# Patient Record
Sex: MALE | Race: WHITE | NOT HISPANIC OR LATINO | Employment: OTHER | URBAN - METROPOLITAN AREA
[De-identification: names, ages, dates, MRNs, and addresses within clinical notes are randomized per-mention and may not be internally consistent; named-entity substitution may affect disease eponyms.]

---

## 2019-02-28 ENCOUNTER — HOSPITAL ENCOUNTER (EMERGENCY)
Facility: HOSPITAL | Age: 59
Discharge: HOME | End: 2019-02-28
Attending: EMERGENCY MEDICINE | Admitting: EMERGENCY MEDICINE
Payer: COMMERCIAL

## 2019-02-28 VITALS
HEIGHT: 71 IN | OXYGEN SATURATION: 95 % | SYSTOLIC BLOOD PRESSURE: 122 MMHG | RESPIRATION RATE: 18 BRPM | HEART RATE: 79 BPM | DIASTOLIC BLOOD PRESSURE: 64 MMHG | TEMPERATURE: 98.4 F | BODY MASS INDEX: 21.7 KG/M2 | WEIGHT: 155 LBS

## 2019-02-28 DIAGNOSIS — R07.9 CHEST PAIN: Primary | ICD-10-CM

## 2019-02-28 LAB
ATRIAL RATE: 69 BPM
P AXIS: 79 DEGREES
PR INTERVAL: 174 MS
QRS AXIS: 88 DEGREES
QRSD INTERVAL: 90 MS
QT INTERVAL: 382 MS
QTC INTERVAL: 409 MS
T WAVE AXIS: 71 DEGREES
TROPONIN I SERPL-MCNC: <0.02 NG/ML
VENTRICULAR RATE: 69 BPM

## 2019-02-28 PROCEDURE — 93005 ELECTROCARDIOGRAM TRACING: CPT

## 2019-02-28 PROCEDURE — 99283 EMERGENCY DEPT VISIT LOW MDM: CPT

## 2019-02-28 PROCEDURE — 93010 ELECTROCARDIOGRAM REPORT: CPT | Performed by: INTERNAL MEDICINE

## 2019-02-28 PROCEDURE — 36415 COLL VENOUS BLD VENIPUNCTURE: CPT | Performed by: EMERGENCY MEDICINE

## 2019-02-28 PROCEDURE — 84484 ASSAY OF TROPONIN QUANT: CPT | Performed by: EMERGENCY MEDICINE

## 2019-02-28 NOTE — ED PROVIDER NOTES
History  Chief Complaint   Patient presents with    Psychiatric Evaluation     Pt arrives for psych eval   Denies si/hi  Pt was in [address] 12/31/18, states hes still in shock  HPI     Patient is a pleasant 62year old male, chronic psych issues who presents with chest pain for many months  In no way worse today  + vague diluusions that seem to be long standing  No SI or HI  No radiation to the back  No tearing pain going to the back  Normal bilat UE pulses  No pleuritic pain  No history of PE  No new unilateral leg swelling  Non exertional  No sweats  No right sided pain  No vomiting  No fever or cough to suggest pneumonia  No  vomiting or subcue crepitus  Equal breath sounds  No skin rash  No f/c/s/n/v/d  No or sob  No abdominal pain  No dysuria, hematuria  MDM well appearing 62year old male, chronic chest pain and psych issues, clear from a psychiatric standpoint  Will rule out cardiac cause of chest pain and dc  The patient (and any family present) verbalized understanding of the discharge instructions and warnings that would necessitate return to the Emergency Department  Gave verbal in addition to written discharge instructions  Specifically highlighted areas of special concern regarding the written and verbal discharge instructions and return precautions  All questions were answered prior to discharge  None       History reviewed  No pertinent past medical history  History reviewed  No pertinent surgical history  History reviewed  No pertinent family history  I have reviewed and agree with the history as documented  Social History     Tobacco Use    Smoking status: Not on file   Substance Use Topics    Alcohol use: Not on file    Drug use: Not on file        Review of Systems   Cardiovascular: Positive for chest pain  All other systems reviewed and are negative        Physical Exam  Physical Exam   Constitutional: He is oriented to person, place, and time  He appears well-developed and well-nourished  HENT:   Head: Normocephalic and atraumatic  Eyes: Pupils are equal, round, and reactive to light  EOM are normal    Neck: Normal range of motion  Neck supple  Cardiovascular: Normal rate, regular rhythm and normal heart sounds  No murmur heard  Pulmonary/Chest: Effort normal and breath sounds normal  No respiratory distress  He has no wheezes  Abdominal: Soft  Bowel sounds are normal  He exhibits no distension  There is no tenderness  Musculoskeletal: Normal range of motion  He exhibits no edema or tenderness  Neurological: He is alert and oriented to person, place, and time  No cranial nerve deficit  Coordination normal    Skin: Skin is warm and dry  He is not diaphoretic  No erythema  Psychiatric: He has a normal mood and affect  His behavior is normal    Nursing note and vitals reviewed        Vital Signs  ED Triage Vitals   Temperature Pulse Respirations Blood Pressure SpO2   02/28/19 1145 02/28/19 1145 02/28/19 1145 02/28/19 1145 02/28/19 1145   98 4 °F (36 9 °C) 79 18 122/64 95 %      Temp Source Heart Rate Source Patient Position - Orthostatic VS BP Location FiO2 (%)   02/28/19 1145 02/28/19 1145 02/28/19 1145 02/28/19 1145 --   Tympanic Monitor Lying Right arm       Pain Score       02/28/19 1147       8           Vitals:    02/28/19 1145   BP: 122/64   Pulse: 79   Patient Position - Orthostatic VS: Lying       Visual Acuity      ED Medications  Medications - No data to display    Diagnostic Studies  Results Reviewed     Procedure Component Value Units Date/Time    Troponin I [[id number]]  (Normal) Collected:  02/28/19 1319    Lab Status:  Final result Specimen:  Blood from Arm, Left Updated:  02/28/19 1351     Troponin I <0 02 ng/mL                  No orders to display              Procedures  Procedures       Phone Contacts  ED Phone Contact    ED Course                               MDM    Disposition  Final diagnoses: Chest pain     Time reflects when diagnosis was documented in both MDM as applicable and the Disposition within this note     Time User Action Codes Description Comment    2/28/2019  2:33 PM Beatrice Ernandez, [address] [R07 9] Chest pain       ED Disposition     ED Disposition Condition Date/Time Comment    Discharge Stable Thu Feb 28, 2019  2:33 PM Georgiamerlin Gray discharge to home/self care  Follow-up Information     Follow up With Specialties Details Why Leanna Young MD Internal Medicine In 2 days  [address]  [facility]  49  [id number] [phone]            Patient's Medications    No medications on file     No discharge procedures on file      ED Provider  Electronically Signed by           Eliu Cleaning MD  02/28/19 7548

## 2019-05-20 ENCOUNTER — HOSPITAL ENCOUNTER (EMERGENCY)
Facility: HOSPITAL | Age: 59
Discharge: HOME | End: 2019-05-20
Attending: EMERGENCY MEDICINE | Admitting: EMERGENCY MEDICINE
Payer: COMMERCIAL

## 2019-05-20 VITALS
DIASTOLIC BLOOD PRESSURE: 80 MMHG | BODY MASS INDEX: 21.62 KG/M2 | SYSTOLIC BLOOD PRESSURE: 118 MMHG | HEART RATE: 82 BPM | RESPIRATION RATE: 20 BRPM | TEMPERATURE: 98.3 F | WEIGHT: 155 LBS | OXYGEN SATURATION: 97 %

## 2019-05-20 DIAGNOSIS — F22 DELUSION (HCC): Primary | ICD-10-CM

## 2019-05-20 LAB
BILIRUB UR QL STRIP: NEGATIVE
CLARITY UR: CLEAR
COLOR UR: YELLOW
GLUCOSE UR STRIP-MCNC: ABNORMAL MG/DL
HGB UR QL STRIP.AUTO: NEGATIVE
KETONES UR STRIP-MCNC: NEGATIVE MG/DL
LEUKOCYTE ESTERASE UR QL STRIP: NEGATIVE
NITRITE UR QL STRIP: NEGATIVE
PH UR STRIP.AUTO: 6 [PH] (ref 4.5–8)
PROT UR STRIP-MCNC: NEGATIVE MG/DL
SP GR UR STRIP.AUTO: 1.01 (ref 1–1.03)
UROBILINOGEN UR QL STRIP.AUTO: 0.2 E.U./DL

## 2019-05-20 PROCEDURE — 81003 URINALYSIS AUTO W/O SCOPE: CPT

## 2019-05-20 PROCEDURE — 99283 EMERGENCY DEPT VISIT LOW MDM: CPT | Performed by: EMERGENCY MEDICINE

## 2019-05-20 PROCEDURE — 99283 EMERGENCY DEPT VISIT LOW MDM: CPT

## 2019-05-20 RX ORDER — DIVALPROEX SODIUM 500 MG/1
500 TABLET, DELAYED RELEASE ORAL 2 TIMES DAILY
COMMUNITY

## 2020-11-02 ENCOUNTER — OFFICE VISIT (OUTPATIENT)
Dept: GASTROENTEROLOGY | Facility: CLINIC | Age: 60
End: 2020-11-02
Payer: COMMERCIAL

## 2020-11-02 VITALS
TEMPERATURE: 96.5 F | BODY MASS INDEX: 20.86 KG/M2 | WEIGHT: 149 LBS | HEIGHT: 71 IN | DIASTOLIC BLOOD PRESSURE: 67 MMHG | SYSTOLIC BLOOD PRESSURE: 112 MMHG | HEART RATE: 71 BPM

## 2020-11-02 DIAGNOSIS — E08.9 DIABETES MELLITUS DUE TO UNDERLYING CONDITION WITHOUT COMPLICATION, WITHOUT LONG-TERM CURRENT USE OF INSULIN (HCC): Primary | ICD-10-CM

## 2020-11-02 PROCEDURE — 99204 OFFICE O/P NEW MOD 45 MIN: CPT | Performed by: INTERNAL MEDICINE

## 2020-11-04 ENCOUNTER — TRANSCRIBE ORDERS (OUTPATIENT)
Dept: RADIOLOGY | Facility: HOSPITAL | Age: 60
End: 2020-11-04

## 2020-11-04 ENCOUNTER — HOSPITAL ENCOUNTER (OUTPATIENT)
Dept: RADIOLOGY | Facility: HOSPITAL | Age: 60
Discharge: HOME | End: 2020-11-04
Attending: INTERNAL MEDICINE
Payer: COMMERCIAL

## 2020-11-04 DIAGNOSIS — E08.9 DIABETES MELLITUS DUE TO UNDERLYING CONDITION WITHOUT COMPLICATION, WITHOUT LONG-TERM CURRENT USE OF INSULIN (HCC): ICD-10-CM

## 2020-11-04 PROCEDURE — 76705 ECHO EXAM OF ABDOMEN: CPT

## 2020-11-06 ENCOUNTER — TELEPHONE (OUTPATIENT)
Dept: GASTROENTEROLOGY | Facility: AMBULARY SURGERY CENTER | Age: 60
End: 2020-11-06

## 2020-11-19 ENCOUNTER — TELEPHONE (OUTPATIENT)
Dept: GASTROENTEROLOGY | Facility: CLINIC | Age: 60
End: 2020-11-19

## 2020-12-07 ENCOUNTER — LAB (OUTPATIENT)
Dept: LAB | Facility: HOSPITAL | Age: 60
End: 2020-12-07
Attending: INTERNAL MEDICINE
Payer: COMMERCIAL

## 2020-12-07 DIAGNOSIS — N40.3 NODULAR PROSTATE WITH URINARY OBSTRUCTION: ICD-10-CM

## 2020-12-07 DIAGNOSIS — N13.8 ENLARGED PROSTATE WITH URINARY OBSTRUCTION: Primary | ICD-10-CM

## 2020-12-07 DIAGNOSIS — N13.8 NODULAR PROSTATE WITH URINARY OBSTRUCTION: ICD-10-CM

## 2020-12-07 DIAGNOSIS — E08.9 DIABETES MELLITUS DUE TO UNDERLYING CONDITION WITHOUT COMPLICATION, WITHOUT LONG-TERM CURRENT USE OF INSULIN (HCC): ICD-10-CM

## 2020-12-07 DIAGNOSIS — N40.1 ENLARGED PROSTATE WITH URINARY OBSTRUCTION: Primary | ICD-10-CM

## 2020-12-07 LAB
ALBUMIN SERPL BCP-MCNC: 3.4 G/DL (ref 3.5–5)
ALP SERPL-CCNC: 85 U/L (ref 46–116)
ALT SERPL W P-5'-P-CCNC: 20 U/L (ref 12–78)
ANION GAP SERPL CALCULATED.3IONS-SCNC: 3 MMOL/L (ref 4–13)
AST SERPL W P-5'-P-CCNC: 10 U/L (ref 5–45)
BASOPHILS # BLD AUTO: 0.02 THOUSANDS/ΜL (ref 0–0.1)
BASOPHILS NFR BLD AUTO: 0 % (ref 0–1)
BILIRUB SERPL-MCNC: 0.36 MG/DL (ref 0.2–1)
BUN SERPL-MCNC: 12 MG/DL (ref 5–25)
CALCIUM ALBUM COR SERPL-MCNC: 9.2 MG/DL (ref 8.3–10.1)
CALCIUM SERPL-MCNC: 8.7 MG/DL (ref 8.3–10.1)
CHLORIDE SERPL-SCNC: 111 MMOL/L (ref 100–108)
CO2 SERPL-SCNC: 30 MMOL/L (ref 21–32)
CREAT SERPL-MCNC: 0.87 MG/DL (ref 0.6–1.3)
EOSINOPHIL # BLD AUTO: 0.21 THOUSAND/ΜL (ref 0–0.61)
EOSINOPHIL NFR BLD AUTO: 4 % (ref 0–6)
ERYTHROCYTE [DISTWIDTH] IN BLOOD BY AUTOMATED COUNT: 13.7 % (ref 11.6–15.1)
GFR SERPL CREATININE-BSD FRML MDRD: 94 ML/MIN/1.73SQ M
GLUCOSE P FAST SERPL-MCNC: 89 MG/DL (ref 65–99)
HCT VFR BLD AUTO: 47.8 % (ref 36.5–49.3)
HGB BLD-MCNC: 15.9 G/DL (ref 12–17)
IMM GRANULOCYTES # BLD AUTO: 0.03 THOUSAND/UL (ref 0–0.2)
IMM GRANULOCYTES NFR BLD AUTO: 1 % (ref 0–2)
LYMPHOCYTES # BLD AUTO: 1.43 THOUSANDS/ΜL (ref 0.6–4.47)
LYMPHOCYTES NFR BLD AUTO: 25 % (ref 14–44)
MCH RBC QN AUTO: 35.3 PG (ref 26.8–34.3)
MCHC RBC AUTO-ENTMCNC: 33.3 G/DL (ref 31.4–37.4)
MCV RBC AUTO: 106 FL (ref 82–98)
MONOCYTES # BLD AUTO: 0.58 THOUSAND/ΜL (ref 0.17–1.22)
MONOCYTES NFR BLD AUTO: 10 % (ref 4–12)
NEUTROPHILS # BLD AUTO: 3.46 THOUSANDS/ΜL (ref 1.85–7.62)
NEUTS SEG NFR BLD AUTO: 60 % (ref 43–75)
NRBC BLD AUTO-RTO: 0 /100 WBCS
PLATELET # BLD AUTO: 213 THOUSANDS/UL (ref 149–390)
PMV BLD AUTO: 9.4 FL (ref 8.9–12.7)
POTASSIUM SERPL-SCNC: 4.2 MMOL/L (ref 3.5–5.3)
PROT SERPL-MCNC: 6.4 G/DL (ref 6.4–8.2)
PSA SERPL-MCNC: 1.4 NG/ML (ref 0–4)
RBC # BLD AUTO: 4.5 MILLION/UL (ref 3.88–5.62)
SODIUM SERPL-SCNC: 144 MMOL/L (ref 136–145)
WBC # BLD AUTO: 5.73 THOUSAND/UL (ref 4.31–10.16)

## 2020-12-07 PROCEDURE — 36415 COLL VENOUS BLD VENIPUNCTURE: CPT

## 2020-12-07 PROCEDURE — 85025 COMPLETE CBC W/AUTO DIFF WBC: CPT

## 2020-12-07 PROCEDURE — G0103 PSA SCREENING: HCPCS

## 2020-12-07 PROCEDURE — 80053 COMPREHEN METABOLIC PANEL: CPT

## 2021-01-12 ENCOUNTER — TELEPHONE (OUTPATIENT)
Dept: GASTROENTEROLOGY | Facility: CLINIC | Age: 61
End: 2021-01-12

## 2021-01-12 NOTE — TELEPHONE ENCOUNTER
Pt walked in office requesting result for US done in 11/2020  Made aware results were reviewed by MA & RN in 11/2020 pt stated he would like a second opinion because he saw "spots" on his ultrasound  Would also like copy of results sent to home address listed on file  Please advise, pt's ph# [phone]

## 2021-01-18 NOTE — TELEPHONE ENCOUNTER
Relayed message from nurse blane(see 11/2/20 note) pt calling stating he still hasnt received results( went over w/ pt us results pt seems confused)

## 2021-01-25 NOTE — TELEPHONE ENCOUNTER
Patient called regarding US results, I advised patient that US was normal, per the previous notes from PA, Nurse and MA  Patient states he wants a second opinion, (looks like imaging was already faxed to [facility] urologist at pt's request)  He wants to know why he is still experiencing "gallbladder pain"  Please advise, I'm not sure how to proceed with this        Thank you

## 2021-01-26 NOTE — TELEPHONE ENCOUNTER
Please call patient and offer follow up appointment if he has further questions/concerns  Thank you

## 2021-02-23 ENCOUNTER — TELEPHONE (OUTPATIENT)
Dept: GASTROENTEROLOGY | Facility: CLINIC | Age: 61
End: 2021-02-23

## 2021-02-23 NOTE — TELEPHONE ENCOUNTER
Called and left message for pt to call office to reschedule appt with dr Chung Carbon 2/24   He is a patient of dr Rosaura Carnes, and will need to be put on his schedule

## 2021-03-02 ENCOUNTER — LAB (OUTPATIENT)
Dept: LAB | Facility: HOSPITAL | Age: 61
End: 2021-03-02
Payer: COMMERCIAL

## 2021-03-02 ENCOUNTER — TELEPHONE (OUTPATIENT)
Dept: GASTROENTEROLOGY | Facility: AMBULARY SURGERY CENTER | Age: 61
End: 2021-03-02

## 2021-03-02 DIAGNOSIS — E08.9 DIABETES MELLITUS DUE TO UNDERLYING CONDITION WITHOUT COMPLICATION, WITHOUT LONG-TERM CURRENT USE OF INSULIN (HCC): Primary | ICD-10-CM

## 2021-03-02 DIAGNOSIS — E08.9 DIABETES MELLITUS DUE TO UNDERLYING CONDITION WITHOUT COMPLICATION, WITHOUT LONG-TERM CURRENT USE OF INSULIN (HCC): ICD-10-CM

## 2021-03-02 LAB
ALBUMIN SERPL BCP-MCNC: 3.8 G/DL (ref 3.5–5)
ALP SERPL-CCNC: 90 U/L (ref 46–116)
ALT SERPL W P-5'-P-CCNC: 24 U/L (ref 12–78)
ANION GAP SERPL CALCULATED.3IONS-SCNC: 4 MMOL/L (ref 4–13)
AST SERPL W P-5'-P-CCNC: 17 U/L (ref 5–45)
BASOPHILS # BLD AUTO: 0.02 THOUSANDS/ΜL (ref 0–0.1)
BASOPHILS NFR BLD AUTO: 0 % (ref 0–1)
BILIRUB SERPL-MCNC: 0.38 MG/DL (ref 0.2–1)
BUN SERPL-MCNC: 10 MG/DL (ref 5–25)
CALCIUM SERPL-MCNC: 8.7 MG/DL (ref 8.3–10.1)
CHLORIDE SERPL-SCNC: 103 MMOL/L (ref 100–108)
CO2 SERPL-SCNC: 34 MMOL/L (ref 21–32)
CREAT SERPL-MCNC: 0.96 MG/DL (ref 0.6–1.3)
EOSINOPHIL # BLD AUTO: 0.08 THOUSAND/ΜL (ref 0–0.61)
EOSINOPHIL NFR BLD AUTO: 1 % (ref 0–6)
ERYTHROCYTE [DISTWIDTH] IN BLOOD BY AUTOMATED COUNT: 13.3 % (ref 11.6–15.1)
GFR SERPL CREATININE-BSD FRML MDRD: 86 ML/MIN/1.73SQ M
GLUCOSE SERPL-MCNC: 199 MG/DL (ref 65–140)
HCT VFR BLD AUTO: 43.9 % (ref 36.5–49.3)
HGB BLD-MCNC: 14.9 G/DL (ref 12–17)
IMM GRANULOCYTES # BLD AUTO: 0.02 THOUSAND/UL (ref 0–0.2)
IMM GRANULOCYTES NFR BLD AUTO: 0 % (ref 0–2)
LYMPHOCYTES # BLD AUTO: 1.38 THOUSANDS/ΜL (ref 0.6–4.47)
LYMPHOCYTES NFR BLD AUTO: 25 % (ref 14–44)
MCH RBC QN AUTO: 34.6 PG (ref 26.8–34.3)
MCHC RBC AUTO-ENTMCNC: 33.9 G/DL (ref 31.4–37.4)
MCV RBC AUTO: 102 FL (ref 82–98)
MONOCYTES # BLD AUTO: 0.49 THOUSAND/ΜL (ref 0.17–1.22)
MONOCYTES NFR BLD AUTO: 9 % (ref 4–12)
NEUTROPHILS # BLD AUTO: 3.6 THOUSANDS/ΜL (ref 1.85–7.62)
NEUTS SEG NFR BLD AUTO: 65 % (ref 43–75)
NRBC BLD AUTO-RTO: 0 /100 WBCS
PLATELET # BLD AUTO: 251 THOUSANDS/UL (ref 149–390)
PMV BLD AUTO: 9.3 FL (ref 8.9–12.7)
POTASSIUM SERPL-SCNC: 3.9 MMOL/L (ref 3.5–5.3)
PROT SERPL-MCNC: 7 G/DL (ref 6.4–8.2)
RBC # BLD AUTO: 4.31 MILLION/UL (ref 3.88–5.62)
SODIUM SERPL-SCNC: 141 MMOL/L (ref 136–145)
WBC # BLD AUTO: 5.59 THOUSAND/UL (ref 4.31–10.16)

## 2021-03-02 PROCEDURE — 36415 COLL VENOUS BLD VENIPUNCTURE: CPT

## 2021-03-02 PROCEDURE — 80053 COMPREHEN METABOLIC PANEL: CPT

## 2021-03-02 PROCEDURE — 85025 COMPLETE CBC W/AUTO DIFF WBC: CPT

## 2021-03-02 NOTE — TELEPHONE ENCOUNTER
Patients GI provider:  Dr Lary Dumas     Number to return call: [phone]    Reason for call: Pt calling stated he is in pain would like to speak with someone to discuss    Scheduled procedure/appointment date if applicable: Apt/procedure 3/10/21

## 2021-03-02 NOTE — TELEPHONE ENCOUNTER
Triage Telephone Intake  Dr Lara Wu   Chief complaint: 8/10 constant pain in RUQ of ABD x 1 year     Diarrhea:denies  Constipation: occasional  LBM: today  Nausea:denies   Vomiting:occasional   Fever:denies  Chills:denies  Patient stated he sees psychiatry every 28 days & has a   Patient states he has not had alcohol to drink in 8 months   Denies taking Acetaminophen or Ibuprofen         Last office visit:20  Patient has follow up appointment scheduled:3/10/21  Labs/imagin20 U/S RUQ ABD     Message forwarded to provider re: recommendations:  Do you want to order U/S RUQ? Labs to check liver enzymes, CMP, CBC?

## 2021-03-02 NOTE — TELEPHONE ENCOUNTER
Patient made aware labs ordered to be obtained today or tomorrow prior to 3/10/21 office visit  Patient made aware if pain persists or worsens he should be evaluated in ED

## 2021-03-10 ENCOUNTER — OFFICE VISIT (OUTPATIENT)
Dept: GASTROENTEROLOGY | Facility: CLINIC | Age: 61
End: 2021-03-10
Payer: COMMERCIAL

## 2021-03-10 VITALS
SYSTOLIC BLOOD PRESSURE: 110 MMHG | TEMPERATURE: 95 F | HEART RATE: 73 BPM | DIASTOLIC BLOOD PRESSURE: 71 MMHG | WEIGHT: 165.2 LBS | BODY MASS INDEX: 23.13 KG/M2 | HEIGHT: 71 IN

## 2021-03-10 DIAGNOSIS — Z12.11 SCREENING FOR COLON CANCER: ICD-10-CM

## 2021-03-10 DIAGNOSIS — K59.09 OTHER CONSTIPATION: Primary | ICD-10-CM

## 2021-03-10 DIAGNOSIS — F22 DELUSIONAL DISORDER (HCC): ICD-10-CM

## 2021-03-10 PROCEDURE — 99214 OFFICE O/P EST MOD 30 MIN: CPT | Performed by: INTERNAL MEDICINE

## 2021-03-10 RX ORDER — BLOOD SUGAR DIAGNOSTIC
STRIP MISCELLANEOUS
COMMUNITY
Start: 2021-02-04

## 2021-03-10 RX ORDER — PALIPERIDONE PALMITATE 234 MG/1.5ML
INJECTION INTRAMUSCULAR
COMMUNITY
Start: 2021-03-05

## 2021-03-10 RX ORDER — LINAGLIPTIN 5 MG/1
TABLET, FILM COATED ORAL
COMMUNITY
Start: 2021-03-04

## 2021-03-10 RX ORDER — RISPERIDONE 1 MG/1
4 TABLET, FILM COATED ORAL DAILY
COMMUNITY

## 2021-03-10 RX ORDER — ATORVASTATIN CALCIUM 20 MG/1
TABLET, FILM COATED ORAL
COMMUNITY
Start: 2021-02-13

## 2021-03-10 RX ORDER — MELOXICAM 15 MG/1
TABLET ORAL
COMMUNITY
Start: 2020-12-03

## 2021-03-10 RX ORDER — IPRATROPIUM/ALBUTEROL SULFATE 20-100 MCG
1 MIST INHALER (GRAM) INHALATION 4 TIMES DAILY PRN
COMMUNITY
Start: 2020-09-15

## 2021-03-10 NOTE — ASSESSMENT & PLAN NOTE
Due for colorectal cancer screening  Reports having had colonoscopy several years ago  Report and results are unavailable  No alarm symptoms but limited information as patient is poor historian    · Follow-up in 6 months to discuss colon cancer screening  · Needs psychiatric issues to be addressed and optimized  · Advised importance of close follow-up with family doctor and potentially psychiatry as well  · If unable to adequately perform prep for colonoscopy, can consider other modalities as well such as Cologuard if patient is able and willing once his psychiatric issues have been addressed

## 2021-03-10 NOTE — PROGRESS NOTES
[facility] - Outpatient Follow-up  Migel Fonseca 61 y o  male MRN: [id number]  Encounter: [id number]      ASSESSMENT AND PLAN:      Problem List Items Addressed This Visit        Other    Screening for colon cancer     Due for colorectal cancer screening  Reports having had colonoscopy several years ago  Report and results are unavailable  No alarm symptoms but limited information as patient is poor historian  · Follow-up in 6 months to discuss colon cancer screening  · Needs psychiatric issues to be addressed and optimized  · Advised importance of close follow-up with family doctor and potentially psychiatry as well  · If unable to adequately perform prep for colonoscopy, can consider other modalities as well such as Cologuard if patient is able and willing once his psychiatric issues have been addressed         Other constipation - Primary     Not causing any significant distress at this time but limited history as patient is poor historian  Reports having bowel movement every other day  Does endorse some delusions regarding his bowel movements as well  · Encouraged adequate hydration  · Continue symptomatic management with OTC medications for now         Delusional disorder ([address] )     Does not appear to be adequately treated at this time as patient continues to express delusions and has limited insight as to his medical conditions  Expresses multiple delusional beliefs including thinking he has a computer in his rectum    · Requires close follow-up with family doctor and psychiatry for better control of his psychiatric issues prior to any other medical intervention or measure  · Advised importance of close follow-up with family doctor to patient; states he does have an appointment scheduled  · Continue psychiatric medications per PCP         Relevant Medications    Invega Sustenna 234 MG/1 5ML IM injection    risperiDONE (RisperDAL) 1 mg tablet ______________________________________________________________________    HPI:  72-year-old male presents to GI office for follow-up  Patient is a poor historian with limited insight into his medical issues due to ongoing active delusions  He denies any acute GI complaints at this time but does report constipation from time to time  Reports having bowel movement every other day but states he "has a computer in his rectum" and he sees "sperm cells in his stool"  I reviewed imaging and lab results with him  His main complaint is back pain which he believes is due to a herniated disc which he attributes to lithium that he was taking during a previous inpatient psychiatric stay  I asked him about previous colonoscopy and he states he had one several years ago by Dr Helen Sun  Report and results are not available for review and patient is unable to recall specific findings  Denies any other alarm symptoms at this time but is limited due to his psychiatric issues  REVIEW OF SYSTEMS:    CONSTITUTIONAL: Denies any fever, chills, rigors, and weight loss  HEENT: No earache or tinnitus, denies hearing loss or visual disturbances  CARDIOVASCULAR: No chest pain or palpitations   RESPIRATORY: Denies any cough, hemoptysis, shortness of breath or dyspnea on exertion  GASTROINTESTINAL: As noted in the History of Present Illness   GENITOURINARY: No problems with urination, denies any hematuria or dysuria  NEUROLOGIC: No dizziness or vertigo, denies headaches   MUSCULOSKELETAL: Denies any muscle or joint pain   SKIN: Denies skin rashes or itching   ENDOCRINE: Denies excessive thirst, denies intolerance to heat or cold  PSYCHOSOCIAL: Denies depression or anxiety, denies any recent memory loss     Historical Information   History reviewed  No pertinent past medical history  History reviewed  No pertinent surgical history    Social History   Social History     Substance and Sexual Activity   Alcohol Use Yes    Comment: rare     Social History     Substance and Sexual Activity   Drug Use Not Currently    Comment: "i used to"     Social History     Tobacco Use   Smoking Status Current Every Day Smoker    Types: Cigars   Smokeless Tobacco Never Used     History reviewed  No pertinent family history  Meds/Allergies   (Not in a hospital admission)    No current facility-administered medications for this visit  Allergies   Allergen Reactions    Iodine Other (See Comments)     unknown         PHYSICAL EXAM:      Objective   Blood pressure 110/71, pulse 73, temperature (!) 95 °F (35 °C), temperature source Tympanic, height 5' 11" (1 803 m), weight 74 9 kg (165 lb 3 2 oz)  Body mass index is 23 04 kg/m²  General Appearance:   Alert, cooperative, no distress, expresses delusions and poor insight into medical issues   HEENT:   Normocephalic, atraumatic, anicteric, strabismus     Neck:   Supple, symmetrical, trachea midline   Lungs:   Clear to auscultation bilaterally; no rales, rhonchi or wheezing; respirations unlabored    Heart:   Regular rate and rhythm; no murmur, rub, or gallop   Abdomen:   Soft, non-tender, non-distended; normal bowel sounds; no masses, no organomegaly    Genitalia:   Deferred    Rectal:   Deferred    Extremities:   No cyanosis, clubbing or edema    Pulses:   2+ and symmetric all extremities    Skin:   No jaundice, rashes, or lesions    Lymph Nodes:   No palpable cervical lymphadenopathy      LAB RESULTS:     No visits with results within 1 Day(s) from this visit     Latest known visit with results is:   Lab on 03/02/2021   Component Date Value    WBC 03/02/2021 5 59     RBC 03/02/2021 4 31     Hemoglobin 03/02/2021 14 9     Hematocrit 03/02/2021 43 9     MCV 03/02/2021 102*    MCH 03/02/2021 34 6*    MCHC 03/02/2021 33 9     RDW 03/02/2021 13 3     MPV 03/02/2021 9 3     Platelets 35/17/4710 251     nRBC 03/02/2021 0     Neutrophils Relative 03/02/2021 65     Immat GRANS % 03/02/2021 0     Lymphocytes Relative 03/02/2021 25     Monocytes Relative 03/02/2021 9     Eosinophils Relative 03/02/2021 1     Basophils Relative 03/02/2021 0     Neutrophils Absolute 03/02/2021 3 60     Immature Grans Absolute 03/02/2021 0 02     Lymphocytes Absolute 03/02/2021 1 38     Monocytes Absolute 03/02/2021 0 49     Eosinophils Absolute 03/02/2021 0 08     Basophils Absolute 03/02/2021 0 02     Sodium 03/02/2021 141     Potassium 03/02/2021 3 9     Chloride 03/02/2021 103     CO2 03/02/2021 34*    ANION GAP 03/02/2021 4     BUN 03/02/2021 10     Creatinine 03/02/2021 0 96     Glucose 03/02/2021 199*    Calcium 03/02/2021 8 7     AST 03/02/2021 17     ALT 03/02/2021 24     Alkaline Phosphatase 03/02/2021 90     Total Protein 03/02/2021 7 0     Albumin 03/02/2021 3 8     Total Bilirubin 03/02/2021 0 38     eGFR 03/02/2021 86        RADIOLOGY RESULTS: I have personally reviewed pertinent imaging studies  LATANYA Salgado  Gastroenterology Fellow  [facility]  Available on [email or web address]

## 2021-03-10 NOTE — PATIENT INSTRUCTIONS
1  Follow-up with your family doctor for your back pain  2  You are due for your colonoscopy  We have recommended this for you but you have elected to defer for now  I would follow-up with your family doctor and discuss need for colon cancer screening  We can readdress this in 6 months at your follow-up

## 2021-03-10 NOTE — ASSESSMENT & PLAN NOTE
Not causing any significant distress at this time but limited history as patient is poor historian  Reports having bowel movement every other day  Does endorse some delusions regarding his bowel movements as well    · Encouraged adequate hydration  · Continue symptomatic management with OTC medications for now

## 2021-03-10 NOTE — ASSESSMENT & PLAN NOTE
Does not appear to be adequately treated at this time as patient continues to express delusions and has limited insight as to his medical conditions  Expresses multiple delusional beliefs including thinking he has a computer in his rectum    · Requires close follow-up with family doctor and psychiatry for better control of his psychiatric issues prior to any other medical intervention or measure  · Advised importance of close follow-up with family doctor to patient; states he does have an appointment scheduled  · Continue psychiatric medications per PCP

## 2021-04-05 ENCOUNTER — TELEPHONE (OUTPATIENT)
Dept: GASTROENTEROLOGY | Facility: CLINIC | Age: 61
End: 2021-04-05

## 2021-04-05 NOTE — TELEPHONE ENCOUNTER
Patient of Dr Leonel Schlatter, last seen 3/10/21    History of constipation, delusional disorder    Called and spoke with patient  He called to discuss his 11/2000 u/s results  He has called several times in the past to discuss the same u/s results  He asked if the results show a kidney stone  I let him know there was no kidney stones noted on his ultrasound  He told me he was experiencing incontinence and there was no sperm in his penis  I let him know that we are a gastroenterologist so he should contact his PCP or a urologist with those concerns  He then asked me if his liver had cirrhosis  I explained it did not on the last ultrasound  He then mentioned having a cyst in his rectum  He had previously told Dr Leonel Schlatter in 3/10 appointment he believed he had a computer chip in his rectum  He states that because of the cyst he does not want to come see us anymore  I advised if he has any further health concerns we can help with he should call us   No further questions

## 2021-04-05 NOTE — TELEPHONE ENCOUNTER
Pt requested a call back from a nurse regarding some questions on ultra sound done on 11/4/20  Please follow up at [phone]

## 2022-10-12 PROBLEM — Z12.11 SCREENING FOR COLON CANCER: Status: RESOLVED | Noted: 2021-03-10 | Resolved: 2022-10-12

## 2022-11-07 ENCOUNTER — HOSPITAL ENCOUNTER (EMERGENCY)
Facility: HOSPITAL | Age: 62
Discharge: HOME | End: 2022-11-07
Attending: EMERGENCY MEDICINE

## 2022-11-07 VITALS
OXYGEN SATURATION: 99 % | RESPIRATION RATE: 18 BRPM | HEIGHT: 71 IN | BODY MASS INDEX: 23.1 KG/M2 | WEIGHT: 165 LBS | HEART RATE: 79 BPM | TEMPERATURE: 98.3 F | DIASTOLIC BLOOD PRESSURE: 81 MMHG | SYSTOLIC BLOOD PRESSURE: 125 MMHG

## 2022-11-07 DIAGNOSIS — F20.0 PARANOID SCHIZOPHRENIA (HCC): ICD-10-CM

## 2022-11-07 DIAGNOSIS — K08.89 PAIN, DENTAL: Primary | ICD-10-CM

## 2022-11-07 LAB
ALBUMIN SERPL BCP-MCNC: 4.3 G/DL (ref 3.5–5)
ALP SERPL-CCNC: 77 U/L (ref 34–104)
ALT SERPL W P-5'-P-CCNC: 14 U/L (ref 7–52)
ANION GAP SERPL CALCULATED.3IONS-SCNC: 5 MMOL/L (ref 4–13)
AST SERPL W P-5'-P-CCNC: 17 U/L (ref 13–39)
BASOPHILS # BLD AUTO: 0.04 THOUSANDS/ÂΜL (ref 0–0.1)
BASOPHILS NFR BLD AUTO: 1 % (ref 0–1)
BILIRUB SERPL-MCNC: 0.6 MG/DL (ref 0.2–1)
BUN SERPL-MCNC: 6 MG/DL (ref 5–25)
CALCIUM SERPL-MCNC: 9.2 MG/DL (ref 8.4–10.2)
CHLORIDE SERPL-SCNC: 97 MMOL/L (ref 96–108)
CO2 SERPL-SCNC: 32 MMOL/L (ref 21–32)
CREAT SERPL-MCNC: 0.77 MG/DL (ref 0.6–1.3)
EOSINOPHIL # BLD AUTO: 0.16 THOUSAND/ÂΜL (ref 0–0.61)
EOSINOPHIL NFR BLD AUTO: 3 % (ref 0–6)
ERYTHROCYTE [DISTWIDTH] IN BLOOD BY AUTOMATED COUNT: 13.1 % (ref 11.6–15.1)
ETHANOL EXG-MCNC: 0 MG/DL
GFR SERPL CREATININE-BSD FRML MDRD: 97 ML/MIN/1.73SQ M
GLUCOSE SERPL-MCNC: 124 MG/DL (ref 65–140)
HCT VFR BLD AUTO: 45.1 % (ref 36.5–49.3)
HGB BLD-MCNC: 16.2 G/DL (ref 12–17)
IMM GRANULOCYTES # BLD AUTO: 0.02 THOUSAND/UL (ref 0–0.2)
IMM GRANULOCYTES NFR BLD AUTO: 0 % (ref 0–2)
LYMPHOCYTES # BLD AUTO: 2.05 THOUSANDS/ÂΜL (ref 0.6–4.47)
LYMPHOCYTES NFR BLD AUTO: 37 % (ref 14–44)
MCH RBC QN AUTO: 34.6 PG (ref 26.8–34.3)
MCHC RBC AUTO-ENTMCNC: 35.9 G/DL (ref 31.4–37.4)
MCV RBC AUTO: 96 FL (ref 82–98)
MONOCYTES # BLD AUTO: 0.67 THOUSAND/ÂΜL (ref 0.17–1.22)
MONOCYTES NFR BLD AUTO: 12 % (ref 4–12)
NEUTROPHILS # BLD AUTO: 2.57 THOUSANDS/ÂΜL (ref 1.85–7.62)
NEUTS SEG NFR BLD AUTO: 47 % (ref 43–75)
NRBC BLD AUTO-RTO: 0 /100 WBCS
PLATELET # BLD AUTO: 207 THOUSANDS/UL (ref 149–390)
PMV BLD AUTO: 9 FL (ref 8.9–12.7)
POTASSIUM SERPL-SCNC: 3.8 MMOL/L (ref 3.5–5.3)
PROT SERPL-MCNC: 7 G/DL (ref 6.4–8.4)
RBC # BLD AUTO: 4.68 MILLION/UL (ref 3.88–5.62)
SARS-COV-2 RNA RESP QL NAA+PROBE: NEGATIVE
SODIUM SERPL-SCNC: 134 MMOL/L (ref 135–147)
TSH SERPL DL<=0.05 MIU/L-ACNC: 2.16 UIU/ML (ref 0.45–4.5)
VALPROATE SERPL-MCNC: <10 UG/ML (ref 50–100)
WBC # BLD AUTO: 5.51 THOUSAND/UL (ref 4.31–10.16)

## 2022-11-07 RX ORDER — IBUPROFEN 600 MG/1
600 TABLET ORAL 3 TIMES DAILY
Qty: 30 TABLET | Refills: 0 | Status: SHIPPED | OUTPATIENT
Start: 2022-11-07 | End: 2022-11-17

## 2022-11-07 RX ORDER — IBUPROFEN 600 MG/1
600 TABLET ORAL ONCE
Status: COMPLETED | OUTPATIENT
Start: 2022-11-07 | End: 2022-11-07

## 2022-11-07 RX ORDER — PENICILLIN V POTASSIUM 500 MG/1
500 TABLET ORAL 4 TIMES DAILY
Qty: 28 TABLET | Refills: 0 | Status: SHIPPED | OUTPATIENT
Start: 2022-11-07 | End: 2022-11-14

## 2022-11-07 RX ORDER — PENICILLIN V POTASSIUM 250 MG/1
500 TABLET ORAL ONCE
Status: COMPLETED | OUTPATIENT
Start: 2022-11-07 | End: 2022-11-07

## 2022-11-07 RX ORDER — PENICILLIN V POTASSIUM 250 MG/1
500 TABLET ORAL EVERY 6 HOURS SCHEDULED
Status: DISCONTINUED | OUTPATIENT
Start: 2022-11-07 | End: 2022-11-07 | Stop reason: HOSPADM

## 2022-11-07 RX ADMIN — PENICILLIN V POTASSIUM 500 MG: 250 TABLET, FILM COATED ORAL at 01:56

## 2022-11-07 RX ADMIN — IBUPROFEN 600 MG: 600 TABLET, FILM COATED ORAL at 01:56

## 2022-11-07 NOTE — ED NOTES
This writer discussed the patients current presentation and recommended discharge plan with Dr Boykin   They agree with the patient being discharged at this time with referrals and/or information about ACT    The patient was Instructed to follow up with their act   In addition, the patient was instructed to call local [facility] crisis, other crisis services, [facility] or to go to the nearest ER immediately if their situation changes at any time  This writer discussed discharge plans with the patient and Act  who agrees with and understands the discharge plans           SAFETY PLAN  Warning Signs (thoughts, images, mood, behavior, situations) of a potential crisis: depression, suicidal thoughts      Coping Skills (what can I do to take my mind off the problem, or to keep myself safe): speak to brother or act       Outside Support (who can I reach out to for support and help): Return to ED        National Suicide Prevention Hotline:  [facility] [address] [place] [address] [phone] - [facility] [address]: [facility]: [address] [facility] [phone] - Crisis   [phone] - Peer Support Talk Line (1-9pm daily)  [phone] - Teen Support Talk Line (1-9pm daily)  [address] [place] ([place]) [phone] - [facility]

## 2022-11-07 NOTE — ED PROVIDER NOTES
History  Chief Complaint   Patient presents with   • Dental Pain     Patient arrives via EMS stating "I had a tooth pulled last week and I think I have gangrene or fentanyl  It hurts a lot " "I think they injected me with ink  [facility] "     Patient tells me that he had a tooth pulled from the dentist a week or 2  He tells me that he is not sure what the dentist injected in him but he thinks he may have an anchor LSD  Now has pain in his bottom molar  Pain is worse with chewing  It is throbbing without radiation  Onset was gradual a week ago  He wants a medication to help him with the pain  He denies any fevers or chills  He denies any difficulty swallowing  He denies any pain with opening his mouth  Patient tells me that he is constantly worried  He tells me that he has hallucinations  He does me is noncompliant with his psychiatric medicines  He denies being suicidal or homicidal   When asked if he wants to speak with crisis he told me “you tell me you are the doctor "            Prior to Admission Medications   Prescriptions Last Dose Informant Patient Reported? Taking?    Callie Ross Sustenna 234 MG/1 5ML IM injection Not Taking at Unknown time Self Yes No   Patient not taking: Reported on 11/7/2022   OneTouch Ultra test strip  Self Yes Yes   Tradjenta 5 MG TABS Not Taking at Unknown time Self Yes No   Patient not taking: Reported on 11/7/2022   atorvastatin (LIPITOR) 20 mg tablet Not Taking at Unknown time Self Yes No   Patient not taking: Reported on 11/7/2022   divalproex sodium (DEPAKOTE) 500 mg EC tablet Not Taking at Unknown time Self Yes No   Sig: Take 500 mg by mouth 2 (two) times a day   Patient not taking: Reported on 11/7/2022   ipratropium-albuterol (Combivent Respimat) inhaler  Self Yes Yes   Sig: Inhale 1 puff 4 (four) times a day as needed   meloxicam (MOBIC) 15 mg tablet Not Taking at Unknown time Self Yes No   Patient not taking: Reported on 11/7/2022   metFORMIN (GLUCOPHAGE) 500 mg tablet Not Taking at Unknown time Self Yes No   Patient not taking: Reported on 11/7/2022   risperiDONE (RisperDAL) 1 mg tablet Not Taking at Unknown time Self Yes No   Sig: Take 4 mg by mouth daily   Patient not taking: Reported on 11/7/2022      Facility-Administered Medications: None       History reviewed  No pertinent past medical history  History reviewed  No pertinent surgical history  History reviewed  No pertinent family history  I have reviewed and agree with the history as documented  E-Cigarette/Vaping   • E-Cigarette Use Current Every Day User      E-Cigarette/Vaping Substances   • Nicotine Yes    • THC No    • CBD No    • Flavoring No    • Other No    • Unknown No      Social History     Tobacco Use   • Smoking status: Current Every Day Smoker     Types: Cigars   • Smokeless tobacco: Never Used   Vaping Use   • Vaping Use: Every day   • Substances: Nicotine   Substance Use Topics   • Alcohol use: Not Currently     Comment: rare   • Drug use: Not Currently     Comment: "i used to"       Review of Systems   All other systems reviewed and are negative  Physical Exam  Physical Exam  Vitals and nursing note reviewed  Constitutional:       Appearance: He is well-developed  HENT:      Head: Normocephalic and atraumatic  Mouth/Throat:      Comments: Generalized poor dentition  I do not see a wound consistent with recent extraction  No trismus  No Herbert's angina  Eyes:      Conjunctiva/sclera: Conjunctivae normal    Cardiovascular:      Rate and Rhythm: Normal rate and regular rhythm  Heart sounds: No murmur heard  Pulmonary:      Effort: Pulmonary effort is normal  No respiratory distress  Breath sounds: Normal breath sounds  Abdominal:      Palpations: Abdomen is soft  Tenderness: There is no abdominal tenderness  Musculoskeletal:      Cervical back: Neck supple  Skin:     General: Skin is warm and dry     Neurological:      Mental Status: He is alert  Psychiatric:      Comments: Eye contact:  Intense  Thought contact:  Paranoid  Judgement:  Poor         Vital Signs  ED Triage Vitals   Temperature Pulse Respirations Blood Pressure SpO2   11/07/22 0138 11/07/22 0138 11/07/22 0138 11/07/22 0138 11/07/22 0138   98 3 °F (36 8 °C) 79 18 125/81 99 %      Temp Source Heart Rate Source Patient Position - Orthostatic VS BP Location FiO2 (%)   11/07/22 0138 11/07/22 0138 11/07/22 0138 11/07/22 0138 --   Oral Monitor Sitting Left arm       Pain Score       11/07/22 0156       5           Vitals:    11/07/22 0138   BP: 125/81   Pulse: 79   Patient Position - Orthostatic VS: Sitting         Visual Acuity      ED Medications  Medications   penicillin V potassium (VEETID) tablet 500 mg (0 mg Oral Hold 11/7/22 0530)   ibuprofen (MOTRIN) tablet 600 mg (600 mg Oral Given 11/7/22 0156)   penicillin V potassium (VEETID) tablet 500 mg (500 mg Oral Given 11/7/22 0156)       Diagnostic Studies  Results Reviewed     Procedure Component Value Units Date/Time    Valproic acid level, total [[id number]]  (Abnormal) Collected: 11/07/22 0217    Lab Status: Final result Specimen: Blood from Arm, Right Updated: 11/07/22 0259     Valproic Acid, Total <10 ug/mL     COVID only [[id number]]  (Normal) Collected: 11/07/22 0209    Lab Status: Final result Specimen: Nares from Nose Updated: 11/07/22 0255     SARS-CoV-2 Negative    Narrative:      FOR PEDIATRIC PATIENTS - copy/paste COVID Guidelines URL to browser: [email or web address] org/  ashx    SARS-CoV-2 assay is a Nucleic Acid Amplification assay intended for the  qualitative detection of nucleic acid from SARS-CoV-2 in nasopharyngeal  swabs  Results are for the presumptive identification of SARS-CoV-2 RNA  Positive results are indicative of infection with SARS-CoV-2, the virus  causing COVID-19, but do not rule out bacterial infection or co-infection  with other viruses   Laboratories within the [place] and its  territories are required to report all positive results to the appropriate  public health authorities  Negative results do not preclude SARS-CoV-2  infection and should not be used as the sole basis for treatment or other  patient management decisions  Negative results must be combined with  clinical observations, patient history, and epidemiological information  This test has not been FDA cleared or approved  This test has been authorized by FDA under an Emergency Use Authorization  (EUA)  This test is only authorized for the duration of time the  declaration that circumstances exist justifying the authorization of the  emergency use of an in vitro diagnostic tests for detection of SARS-CoV-2  virus and/or diagnosis of COVID-19 infection under section 564(b)(1) of  the Act, 21 U  S C  353HFU-6(M)(0), unless the authorization is terminated  or revoked sooner  The test has been validated but independent review by FDA  and CLIA is pending  Test performed using [employer] GeneXpert: This RT-PCR assay targets N2,  a region unique to SARS-CoV-2  A conserved region in the E-gene was chosen  for pan-Sarbecovirus detection which includes SARS-CoV-2  According to CMS-2020-01-R, this platform meets the definition of high-throughput technology  TSH [[id number]]  (Normal) Collected: 11/07/22 0217    Lab Status: Final result Specimen: Blood from Arm, Right Updated: 11/07/22 0254     TSH 3RD GENERATON 2 163 uIU/mL     Narrative:      Patients undergoing fluorescein dye angiography may retain small amounts of fluorescein in the body for 48-72 hours post procedure  Samples containing fluorescein can produce falsely depressed TSH values  If the patient had this procedure,a specimen should be resubmitted post fluorescein clearance        Comprehensive metabolic panel [[id number]]  (Abnormal) Collected: 11/07/22 0217    Lab Status: Final result Specimen: Blood from Arm, Right Updated: 11/07/22 0241     Sodium 134 mmol/L      Potassium 3 8 mmol/L      Chloride 97 mmol/L      CO2 32 mmol/L      ANION GAP 5 mmol/L      BUN 6 mg/dL      Creatinine 0 77 mg/dL      Glucose 124 mg/dL      Calcium 9 2 mg/dL      AST 17 U/L      ALT 14 U/L      Alkaline Phosphatase 77 U/L      Total Protein 7 0 g/dL      Albumin 4 3 g/dL      Total Bilirubin 0 60 mg/dL      eGFR 97 ml/min/1 73sq m     Narrative:      National Kidney Disease Foundation guidelines for Chronic Kidney Disease (CKD):   •  Stage 1 with normal or high GFR (GFR > 90 mL/min/1 73 square meters)  •  Stage 2 Mild CKD (GFR = 60-89 mL/min/1 73 square meters)  •  Stage 3A Moderate CKD (GFR = 45-59 mL/min/1 73 square meters)  •  Stage 3B Moderate CKD (GFR = 30-44 mL/min/1 73 square meters)  •  Stage 4 Severe CKD (GFR = 15-29 mL/min/1 73 square meters)  •  Stage 5 End Stage CKD (GFR <15 mL/min/1 73 square meters)  Note: GFR calculation is accurate only with a steady state creatinine    CBC and differential [[id number]]  (Abnormal) Collected: 11/07/22 0217    Lab Status: Final result Specimen: Blood from Arm, Right Updated: 11/07/22 0223     WBC 5 51 Thousand/uL      RBC 4 68 Million/uL      Hemoglobin 16 2 g/dL      Hematocrit 45 1 %      MCV 96 fL      MCH 34 6 pg      MCHC 35 9 g/dL      RDW 13 1 %      MPV 9 0 fL      Platelets 380 Thousands/uL      nRBC 0 /100 WBCs      Neutrophils Relative 47 %      Immat GRANS % 0 %      Lymphocytes Relative 37 %      Monocytes Relative 12 %      Eosinophils Relative 3 %      Basophils Relative 1 %      Neutrophils Absolute 2 57 Thousands/µL      Immature Grans Absolute 0 02 Thousand/uL      Lymphocytes Absolute 2 05 Thousands/µL      Monocytes Absolute 0 67 Thousand/µL      Eosinophils Absolute 0 16 Thousand/µL      Basophils Absolute 0 04 Thousands/µL     POCT alcohol breath test [[id number]]  (Normal) Resulted: 11/07/22 0217    Lab Status: Final result Updated: 11/07/22 0217     EXTBreath Alcohol 0 000    UA w Reflex to Microscopic w Reflex to Culture [[id number]]     Lab Status: No result Specimen: Urine     Rapid drug screen, urine [[id number]]     Lab Status: No result Specimen: Urine                  No orders to display              Procedures  Procedures         ED Course  ED Course as of 11/07/22 0704   [facility] Nov 07, 2022   0244 EKG: SR at 76 with normal QRS, normal ST-t, Qtc 425   0401 Patient is medically cleared  5518 Case signed out to oncoming provider at change of shift pending crisis evaluation                                               MDM  Number of Diagnoses or Management Options  Diagnosis management comments: I evaluate the patient  He appears to be complaining of dental pain  Will treat with penicillin and Motrin  This time no red flags for or deep space infection  Patient is paranoid admits to hallucinations and has baseline history of paranoid schizophrenia  At this time, patient does not meet criteria for involuntary commitment but he is willing to speak with crisis  I do believe he would benefit from getting back in his medications so I will order a psych clearance labs and have crisis evaluate the patient, however, if patient decides he wants to leave at this time I do not believe I have criteria to hold him involuntarily         Amount and/or Complexity of Data Reviewed  Clinical lab tests: ordered        Disposition  Final diagnoses:   Pain, dental   Paranoid schizophrenia ([facility] [address] )     Time reflects when diagnosis was documented in both MDM as applicable and the Disposition within this note     Time User Action Codes Description Comment    11/7/2022  2:05 AM Janice Garcia Add [K08 89] Pain, dental     11/7/2022  4:01 AM Janice Garcia Add [F20 0] Paranoid schizophrenia [facility])       ED Disposition     ED Disposition   Transfer to [address] [place] [address]   --    Date/Time   Mon Nov 7, 2022  4:01 AM    Comment   Noemi Solis should be transferred out to pending facility and has been medically cleared  Follow-up Information    None         Patient's Medications   Discharge Prescriptions    IBUPROFEN (MOTRIN) 600 MG TABLET    Take 1 tablet (600 mg total) by mouth 3 (three) times a day for 10 days       Start Date: 11/7/2022 End Date: 11/17/2022       Order Dose: 600 mg       Quantity: 30 tablet    Refills: 0    PENICILLIN V POTASSIUM (VEETID) 500 MG TABLET    Take 1 tablet (500 mg total) by mouth 4 (four) times a day for 7 days       Start Date: 11/7/2022 End Date: 11/14/2022       Order Dose: 500 mg       Quantity: 28 tablet    Refills: 0       No discharge procedures on file      PDMP Review     None          ED Provider  Electronically Signed by           Therisa Dubin, MD  11/07/22 6167

## 2022-11-07 NOTE — ED NOTES
Crisis met with Pt and spoke to Act  Fredrick Mortensen  Number provided by Pt  Pt presented in ED due to tooth ache  Pt states that he has both verbal and visual hallucinations but doesn't know what they say or look like  He denies SI or HI  Pt was oriented X4 during assessment   states that she does not believe he needs services at this time  Pt is seen by act 3 X per week and is compliant with his appointments  She stated that he does have hallucinations but is not a danger to himself or others and functions well in the community  Pt also lives by himself and able to take care of his daily needs  Pt stated that his sleep is fair but appetite is good  Act  will be calling him today to follow up  Pt is recommended with discharge  Fredrick Mortensen stated any additional resources needed will be provided by them

## 2022-11-11 LAB
ATRIAL RATE: 68 BPM
P AXIS: 75 DEGREES
PR INTERVAL: 174 MS
QRS AXIS: 67 DEGREES
QRSD INTERVAL: 88 MS
QT INTERVAL: 400 MS
QTC INTERVAL: 425 MS
T WAVE AXIS: 65 DEGREES
VENTRICULAR RATE: 68 BPM

## 2023-01-12 ENCOUNTER — HOSPITAL ENCOUNTER (INPATIENT)
Facility: HOSPITAL | Age: 63
LOS: 15 days | Discharge: HOME | End: 2023-01-27
Attending: PSYCHIATRY & NEUROLOGY | Admitting: PSYCHIATRY & NEUROLOGY

## 2023-01-12 ENCOUNTER — APPOINTMENT (EMERGENCY)
Dept: CT IMAGING | Facility: HOSPITAL | Age: 63
End: 2023-01-12

## 2023-01-12 ENCOUNTER — HOSPITAL ENCOUNTER (EMERGENCY)
Facility: HOSPITAL | Age: 63
End: 2023-01-12
Attending: EMERGENCY MEDICINE

## 2023-01-12 VITALS
TEMPERATURE: 97.4 F | DIASTOLIC BLOOD PRESSURE: 80 MMHG | OXYGEN SATURATION: 98 % | HEART RATE: 78 BPM | SYSTOLIC BLOOD PRESSURE: 103 MMHG | RESPIRATION RATE: 16 BRPM

## 2023-01-12 DIAGNOSIS — I10 HTN (HYPERTENSION): ICD-10-CM

## 2023-01-12 DIAGNOSIS — E55.9 VITAMIN D DEFICIENCY: ICD-10-CM

## 2023-01-12 DIAGNOSIS — F22 DELUSIONS (HCC): Primary | ICD-10-CM

## 2023-01-12 DIAGNOSIS — H04.123 DRY EYES: ICD-10-CM

## 2023-01-12 DIAGNOSIS — K21.9 GERD (GASTROESOPHAGEAL REFLUX DISEASE): ICD-10-CM

## 2023-01-12 DIAGNOSIS — F20.1 DISORGANIZED SCHIZOPHRENIA (HCC): Primary | ICD-10-CM

## 2023-01-12 DIAGNOSIS — K59.00 CONSTIPATION: ICD-10-CM

## 2023-01-12 DIAGNOSIS — N40.0 BPH (BENIGN PROSTATIC HYPERPLASIA): ICD-10-CM

## 2023-01-12 LAB
ALBUMIN SERPL BCP-MCNC: 4.1 G/DL (ref 3.5–5)
ALP SERPL-CCNC: 67 U/L (ref 34–104)
ALT SERPL W P-5'-P-CCNC: 16 U/L (ref 7–52)
AMPHETAMINES SERPL QL SCN: NEGATIVE
ANION GAP SERPL CALCULATED.3IONS-SCNC: 8 MMOL/L (ref 4–13)
APAP SERPL-MCNC: <10 UG/ML (ref 10–20)
AST SERPL W P-5'-P-CCNC: 21 U/L (ref 13–39)
BARBITURATES UR QL: NEGATIVE
BASOPHILS # BLD AUTO: 0.03 THOUSANDS/ÂΜL (ref 0–0.1)
BASOPHILS NFR BLD AUTO: 1 % (ref 0–1)
BENZODIAZ UR QL: NEGATIVE
BILIRUB SERPL-MCNC: 0.61 MG/DL (ref 0.2–1)
BILIRUB UR QL STRIP: NEGATIVE
BUN SERPL-MCNC: 3 MG/DL (ref 5–25)
CALCIUM SERPL-MCNC: 8.9 MG/DL (ref 8.4–10.2)
CHLORIDE SERPL-SCNC: 103 MMOL/L (ref 96–108)
CLARITY UR: CLEAR
CO2 SERPL-SCNC: 27 MMOL/L (ref 21–32)
COCAINE UR QL: NEGATIVE
COLOR UR: YELLOW
CREAT SERPL-MCNC: 0.83 MG/DL (ref 0.6–1.3)
EOSINOPHIL # BLD AUTO: 0.15 THOUSAND/ÂΜL (ref 0–0.61)
EOSINOPHIL NFR BLD AUTO: 4 % (ref 0–6)
ERYTHROCYTE [DISTWIDTH] IN BLOOD BY AUTOMATED COUNT: 13.2 % (ref 11.6–15.1)
ETHANOL SERPL-MCNC: <10 MG/DL
FLUAV RNA RESP QL NAA+PROBE: NEGATIVE
FLUBV RNA RESP QL NAA+PROBE: NEGATIVE
GFR SERPL CREATININE-BSD FRML MDRD: 94 ML/MIN/1.73SQ M
GLUCOSE SERPL-MCNC: 110 MG/DL (ref 65–140)
GLUCOSE UR STRIP-MCNC: ABNORMAL MG/DL
HCT VFR BLD AUTO: 43.9 % (ref 36.5–49.3)
HGB BLD-MCNC: 15.2 G/DL (ref 12–17)
HGB UR QL STRIP.AUTO: NEGATIVE
IMM GRANULOCYTES # BLD AUTO: 0.02 THOUSAND/UL (ref 0–0.2)
IMM GRANULOCYTES NFR BLD AUTO: 1 % (ref 0–2)
KETONES UR STRIP-MCNC: NEGATIVE MG/DL
LEUKOCYTE ESTERASE UR QL STRIP: NEGATIVE
LYMPHOCYTES # BLD AUTO: 1.56 THOUSANDS/ÂΜL (ref 0.6–4.47)
LYMPHOCYTES NFR BLD AUTO: 36 % (ref 14–44)
MCH RBC QN AUTO: 35.4 PG (ref 26.8–34.3)
MCHC RBC AUTO-ENTMCNC: 34.6 G/DL (ref 31.4–37.4)
MCV RBC AUTO: 102 FL (ref 82–98)
METHADONE UR QL: NEGATIVE
MONOCYTES # BLD AUTO: 0.64 THOUSAND/ÂΜL (ref 0.17–1.22)
MONOCYTES NFR BLD AUTO: 15 % (ref 4–12)
NEUTROPHILS # BLD AUTO: 1.92 THOUSANDS/ÂΜL (ref 1.85–7.62)
NEUTS SEG NFR BLD AUTO: 43 % (ref 43–75)
NITRITE UR QL STRIP: NEGATIVE
NRBC BLD AUTO-RTO: 0 /100 WBCS
OPIATES UR QL SCN: NEGATIVE
OXYCODONE+OXYMORPHONE UR QL SCN: NEGATIVE
PCP UR QL: NEGATIVE
PH UR STRIP.AUTO: 7 [PH]
PLATELET # BLD AUTO: 204 THOUSANDS/UL (ref 149–390)
PMV BLD AUTO: 9.1 FL (ref 8.9–12.7)
POTASSIUM SERPL-SCNC: 3.5 MMOL/L (ref 3.5–5.3)
PROT SERPL-MCNC: 6.7 G/DL (ref 6.4–8.4)
PROT UR STRIP-MCNC: NEGATIVE MG/DL
RBC # BLD AUTO: 4.29 MILLION/UL (ref 3.88–5.62)
RSV RNA RESP QL NAA+PROBE: NEGATIVE
SALICYLATES SERPL-MCNC: <5 MG/DL (ref 3–20)
SARS-COV-2 RNA RESP QL NAA+PROBE: NEGATIVE
SODIUM SERPL-SCNC: 138 MMOL/L (ref 135–147)
SP GR UR STRIP.AUTO: 1.01 (ref 1–1.03)
THC UR QL: NEGATIVE
TSH SERPL DL<=0.05 MIU/L-ACNC: 1.84 UIU/ML (ref 0.45–4.5)
UROBILINOGEN UR QL STRIP.AUTO: 0.2 E.U./DL
WBC # BLD AUTO: 4.32 THOUSAND/UL (ref 4.31–10.16)

## 2023-01-12 RX ORDER — OLANZAPINE 5 MG/1
5 TABLET ORAL
Status: DISCONTINUED | OUTPATIENT
Start: 2023-01-12 | End: 2023-01-27 | Stop reason: HOSPADM

## 2023-01-12 RX ORDER — LORAZEPAM 0.5 MG/1
0.5 TABLET ORAL
Status: CANCELLED | OUTPATIENT
Start: 2023-01-12

## 2023-01-12 RX ORDER — OLANZAPINE 2.5 MG/1
5 TABLET ORAL
Status: CANCELLED | OUTPATIENT
Start: 2023-01-12

## 2023-01-12 RX ORDER — HYDROXYZINE HYDROCHLORIDE 25 MG/1
25 TABLET, FILM COATED ORAL
Status: CANCELLED | OUTPATIENT
Start: 2023-01-12

## 2023-01-12 RX ORDER — DIVALPROEX SODIUM 500 MG/1
500 TABLET, EXTENDED RELEASE ORAL 2 TIMES DAILY
Status: CANCELLED | OUTPATIENT
Start: 2023-01-12

## 2023-01-12 RX ORDER — OLANZAPINE 10 MG/1
5 INJECTION, POWDER, LYOPHILIZED, FOR SOLUTION INTRAMUSCULAR
Status: CANCELLED | OUTPATIENT
Start: 2023-01-12

## 2023-01-12 RX ORDER — LORAZEPAM 2 MG/ML
1 INJECTION INTRAMUSCULAR
Status: DISCONTINUED | OUTPATIENT
Start: 2023-01-12 | End: 2023-01-27 | Stop reason: HOSPADM

## 2023-01-12 RX ORDER — LANOLIN ALCOHOL/MO/W.PET/CERES
3 CREAM (GRAM) TOPICAL
Status: CANCELLED | OUTPATIENT
Start: 2023-01-12

## 2023-01-12 RX ORDER — LORAZEPAM 1 MG/1
1 TABLET ORAL
Status: CANCELLED | OUTPATIENT
Start: 2023-01-12

## 2023-01-12 RX ORDER — HYDROXYZINE HYDROCHLORIDE 25 MG/1
25 TABLET, FILM COATED ORAL
Status: DISCONTINUED | OUTPATIENT
Start: 2023-01-12 | End: 2023-01-27 | Stop reason: HOSPADM

## 2023-01-12 RX ORDER — ACETAMINOPHEN 325 MG/1
650 TABLET ORAL EVERY 4 HOURS PRN
Status: DISCONTINUED | OUTPATIENT
Start: 2023-01-12 | End: 2023-01-27 | Stop reason: HOSPADM

## 2023-01-12 RX ORDER — RISPERIDONE 1 MG/1
1 TABLET ORAL
Status: CANCELLED | OUTPATIENT
Start: 2023-01-12

## 2023-01-12 RX ORDER — ACETAMINOPHEN 325 MG/1
975 TABLET ORAL EVERY 6 HOURS PRN
Status: DISCONTINUED | OUTPATIENT
Start: 2023-01-12 | End: 2023-01-27 | Stop reason: HOSPADM

## 2023-01-12 RX ORDER — OLANZAPINE 2.5 MG/1
2.5 TABLET ORAL
Status: DISCONTINUED | OUTPATIENT
Start: 2023-01-12 | End: 2023-01-27 | Stop reason: HOSPADM

## 2023-01-12 RX ORDER — ACETAMINOPHEN 325 MG/1
650 TABLET ORAL EVERY 4 HOURS PRN
Status: CANCELLED | OUTPATIENT
Start: 2023-01-12

## 2023-01-12 RX ORDER — LORAZEPAM 2 MG/ML
1 INJECTION INTRAMUSCULAR
Status: CANCELLED | OUTPATIENT
Start: 2023-01-12

## 2023-01-12 RX ORDER — LORAZEPAM 0.5 MG/1
0.5 TABLET ORAL
Status: DISCONTINUED | OUTPATIENT
Start: 2023-01-12 | End: 2023-01-27 | Stop reason: HOSPADM

## 2023-01-12 RX ORDER — LANOLIN ALCOHOL/MO/W.PET/CERES
3 CREAM (GRAM) TOPICAL
Status: DISCONTINUED | OUTPATIENT
Start: 2023-01-12 | End: 2023-01-27 | Stop reason: HOSPADM

## 2023-01-12 RX ORDER — BENZTROPINE MESYLATE 1 MG/1
0.5 TABLET ORAL
Status: CANCELLED | OUTPATIENT
Start: 2023-01-12

## 2023-01-12 RX ORDER — OLANZAPINE 2.5 MG/1
2.5 TABLET ORAL
Status: CANCELLED | OUTPATIENT
Start: 2023-01-12

## 2023-01-12 RX ORDER — LORAZEPAM 1 MG/1
1 TABLET ORAL
Status: DISCONTINUED | OUTPATIENT
Start: 2023-01-12 | End: 2023-01-27 | Stop reason: HOSPADM

## 2023-01-12 RX ORDER — BENZTROPINE MESYLATE 0.5 MG/1
0.5 TABLET ORAL
Status: DISCONTINUED | OUTPATIENT
Start: 2023-01-12 | End: 2023-01-27 | Stop reason: HOSPADM

## 2023-01-12 RX ORDER — RISPERIDONE 1 MG/1
1 TABLET ORAL
Status: DISCONTINUED | OUTPATIENT
Start: 2023-01-12 | End: 2023-01-13

## 2023-01-12 RX ORDER — ACETAMINOPHEN 325 MG/1
975 TABLET ORAL EVERY 6 HOURS PRN
Status: CANCELLED | OUTPATIENT
Start: 2023-01-12

## 2023-01-12 RX ORDER — DIVALPROEX SODIUM 500 MG/1
500 TABLET, EXTENDED RELEASE ORAL 2 TIMES DAILY
Status: DISCONTINUED | OUTPATIENT
Start: 2023-01-12 | End: 2023-01-27 | Stop reason: HOSPADM

## 2023-01-12 RX ORDER — OLANZAPINE 10 MG/1
5 INJECTION, POWDER, LYOPHILIZED, FOR SOLUTION INTRAMUSCULAR
Status: DISCONTINUED | OUTPATIENT
Start: 2023-01-12 | End: 2023-01-27 | Stop reason: HOSPADM

## 2023-01-12 RX ADMIN — RISPERIDONE 1 MG: 1 TABLET ORAL at 22:24

## 2023-01-12 RX ADMIN — DIVALPROEX SODIUM 500 MG: 500 TABLET, EXTENDED RELEASE ORAL at 22:24

## 2023-01-12 NOTE — ED NOTES
Call placed to [address] [facility], spoke with Tiffany Lara, to provide update on patient's placement  ACT will notify patient's problem and follow-up while on the unit       CRISPIN Bland  01/12/23    1528

## 2023-01-12 NOTE — ED NOTES
Pt stated that we needed to contact his brother first, because Pt feels [employer] is too far  Crisis left a messages for Pt's brother to call back

## 2023-01-12 NOTE — ED NOTES
[email or web address] to [facility]    Nurse report is to be called to [phone] prior to patient transfer

## 2023-01-12 NOTE — ED NOTES
Patient is accepted at [facility]  Patient is accepted by Dr Vaibhav Gilliland per Carin Bravo, Intake  [facility]  Patient may go to the floor at 13:30 or later        Nurse report is to be called to [phone] prior to patient transfer

## 2023-01-12 NOTE — EMTALA/ACUTE CARE TRANSFER
[facility] EMERGENCY DEPARTMENT  [address] [facility] [address]  Dept: [phone]      YBIXCX TRANSFER CONSENT    NAME Naila Rodriguez                                         1960                              MRN [id number]    I have been informed of my rights regarding examination, treatment, and transfer   by Dr Justina Lui DO    Benefits: Specialized equipment and/or services available at the receiving facility (Include comment)________________________ (Psych)    Risks: Potential for delay in receiving treatment, Potential deterioration of medical condition      Consent for Transfer:  I acknowledge that my medical condition has been evaluated and explained to me by the emergency department physician or other qualified medical person and/or my attending physician, who has recommended that I be transferred to the service of  Accepting Physician: Dr Micaela Pascual at [address] Name, [address] : [address] [facility]  The above potential benefits of such transfer, the potential risks associated with such transfer, and the probable risks of not being transferred have been explained to me, and I fully understand them  The doctor has explained that, in my case, the benefits of transfer outweigh the risks  I agree to be transferred  I authorize the performance of emergency medical procedures and treatments upon me in both transit and upon arrival at the receiving facility  Additionally, I authorize the release of any and all medical records to the receiving facility and request they be transported with me, if possible  I understand that the safest mode of transportation during a medical emergency is an ambulance and that the Hospital advocates the use of this mode of transport   Risks of traveling to the receiving facility by car, including absence of medical control, life sustaining equipment, such as oxygen, and medical personnel has been explained to me and I fully understand them  (LAUREN CORRECT BOX BELOW)  [  ]  I consent to the stated transfer and to be transported by ambulance/helicopter  [  ]  I consent to the stated transfer, but refuse transportation by ambulance and accept full responsibility for my transportation by car  I understand the risks of non-ambulance transfers and I exonerate the Hospital and its staff from any deterioration in my condition that results from this refusal     X___________________________________________    DATE  23  TIME________  Signature of patient or legally responsible individual signing on patient behalf           RELATIONSHIP TO PATIENT_________________________          Provider Certification    NAME Juan Pablo Rausch                                         1960                              MRN [id number]    A medical screening exam was performed on the above named patient  Based on the examination:    Condition Necessitating Transfer The primary encounter diagnosis was Delusions ([address] )  A diagnosis of HTN (hypertension) was also pertinent to this visit      Patient Condition: The patient has been stabilized such that within reasonable medical probability, no material deterioration of the patient condition or the condition of the unborn child(khalif) is likely to result from the transfer    Reason for Transfer: Level of Care needed not available at this facility (Psych)    Transfer Requirements: [address]   · Space available and qualified personnel available for treatment as acknowledged by    · Agreed to accept transfer and to provide appropriate medical treatment as acknowledged by       Dr Leia Snyder  · Appropriate medical records of the examination and treatment of the patient are provided at the time of transfer   [address] _______  · Transfer will be performed by qualified personnel from    and appropriate transfer equipment as required, including the use of necessary and appropriate life support measures  Provider Certification: I have examined the patient and explained the following risks and benefits of being transferred/refusing transfer to the patient/family:  General risk, such as traffic hazards, adverse weather conditions, rough terrain or turbulence, possible failure of equipment (including vehicle or aircraft), or consequences of actions of persons outside the control of the transport personnel, Unanticipated needs of medical equipment and personnel during transport, Risk of worsening condition, The possibility of a transport vehicle being unavailable      Based on these reasonable risks and benefits to the patient and/or the unborn child(khalif), and based upon the information available at the time of the patient’s examination, I certify that the medical benefits reasonably to be expected from the provision of appropriate medical treatments at another medical facility outweigh the increasing risks, if any, to the individual’s medical condition, and in the case of labor to the unborn child, from effecting the transfer      X____________________________________________ DATE 01/12/23        TIME_______      ORIGINAL - SEND TO MEDICAL RECORDS   COPY - SEND WITH PATIENT DURING TRANSFER

## 2023-01-12 NOTE — ED NOTES
Patient transported to [address] [place] [address], [address] [place] [address]  01/12/23 9947

## 2023-01-12 NOTE — ED NOTES
Crisis intervention specialist (CIS) met with pt to complete intake / safety risk assessments upon medical clearance  CIS introduced self and explained role  Patient was brought in via EMS after he called 911 for safety concerns  Patient has a documented history of schizophrenia  Patient is alert and oriented to person place and time though reports he feels confused  Patient is unkempt, flat, coherent, delayed, illogical at times, pleasant, calm and cooperative  Patient states he has "thought disorder, schizophrenia, and autism"  Patient states he is active with [facility] and received his "injection" 3 days ago  Denies that he takes any oral medication  Patient states that all of his food at home is rotten and that he has not eaten in 2 or 3 days  Patient believes he may have lost weight but is unsure  BMI is within normal range  Patient was observed drinking and eating several items from meal tray  Additionally, patient states that yesterday he was cleaning out his car and found a dirty chemical substance that "went right through me "  Patient states that he may have "found fraud" with his car and is unsure if it was towed  Chart reads of patient referencing statements of wanting to die  However, patient denies any thoughts or urges to harm himself  Patient denies homicidal ideations  Patient states that he sometimes hears voices and that he used to see visions, but not presently  He voices concern with locating his vehicle and that he has not eaten over the past couple of days  Patient reports having thought disorder and is paranoid and delusional  Patient states that he lives alone but "Erlinda Mijares helps me"  Geronimo Ashton is his brother  Patient reports 7 or 8 "psych giraldo admissions" with last being "long time ago"  Patient reports he sees "doctor Noah Zambrano" with [facility] and also names caseworkers Gage Bolaños and Gabriel   Patient denies any somatic complaints but requested to "see the MRI results" of the testing that was completed in the ER  Advised that medically he is cleared  Advised that patient would need to go through medical records to obtain any results  Patient has poor judgement and insight and does not appear able to care for himself  Call to [phone] [address] [phone]  S/w Tiffany Lara, and he reported: Patient spiraling, increasingly psychotic and refusing oral meds  Lives alone on brother's property - yesterday he called his brother and talked about being cremated  [facility]/Dr Myrna Watkins support inpatient admission for stabilization  [facility] requesting be notified of where patient will be transferred at [phone]  Updated meds per [facility]:  Invega Sustenna 234 Mg - last dose received some time this week  Depakote: 500 mg HS  Flomax 4 mg HS    Patient was initially suspicious of the 201 document and reported that he needed brother's permission  After a lengthy discussion and explanation of inpatient treatment, process, and his rights, patient signed  ED DO signed the same  201 sent to Intake

## 2023-01-12 NOTE — ED NOTES
Insurance Authorization for admission:  Phone call placed to [employer] number [id number]  Spoke to [address] [place] [address]   7 days approved     Level of care inpatient  Review on 01/18/23  Authorization # [id number]  Darline Cheung is concurrent reviewer [id number]    Pt also has Medicare A & B per promise

## 2023-01-12 NOTE — ED CARE HANDOFF
Emergency Department Sign Out Note        Sign out and transfer of care from Dr Tanvi Streeter  See Separate Emergency Department note  The patient, Samm Orourke, was evaluated by the previous provider for schizophrenia, delusions  Workup Completed:  Patient received a medical screening examination and is medically cleared for psychiatric hospitalization  Patient received blood work  No significant abnormalities  ED Course / Workup Pending (followup): Patient has significant delusions  No insight  Not taking her medications  Thinks food is rotten at home  Not eating for multiple days  Not taking her medications  Patient is delusional   Is unsure what happened to her vehicle  Believes it was stolen by terrorists or that it is broken  201 signed  Patient accepted to [address]  ED Course as of 01/12/23 1657   Thu Jan 12, 2023   0654 59 y/o schizophrenia  Called 911  Wants to die  Has no blood or organs  Delusional here  Thinks food at home is rotten so won't eat  Car taken by terrorists or broken  Crisis to see pt  Off meds  To do: urine   1141 201 signed   1336 Accepted to [facility]  MDM        Disposition  Final diagnoses:   Delusions ([address] )     Time reflects when diagnosis was documented in both MDM as applicable and the Disposition within this note     Time User Action Codes Description Comment    1/12/2023  2:39 AM Mil Sosa Add [F22] Delusions ([address] )     1/12/2023  1:02 PM Natali, [address] [place] [address] HTN (hypertension)       ED Disposition     ED Disposition   Transfer to [address] [place] [address]   --    Date/Time   Thu Jan 12, 2023  2:39 AM    Comment   Samm Orourke should be transferred out to behavioral health and has been medically cleared             MD Documentation    Flowsheet Row Most Recent Value   Patient Condition The patient has been stabilized such that within reasonable medical probability, no material deterioration of the patient condition or the condition of the unborn child(khalif) is likely to result from the transfer   Reason for Transfer Level of Care needed not available at this facility  [Psych]   Benefits of Transfer Specialized equipment and/or services available at the receiving facility (Include comment)________________________  [Psych]   Risks of Transfer Potential for delay in receiving treatment, Potential deterioration of medical condition   Accepting Physician Dr Isela Go Name, Adelso Rooney   Sending MD [facility]   Provider Certification General risk, such as traffic hazards, adverse weather conditions, rough terrain or turbulence, possible failure of equipment (including vehicle or aircraft), or consequences of actions of persons outside the control of the transport personnel, Unanticipated needs of medical equipment and personnel during transport, Risk of worsening condition, The possibility of a transport vehicle being unavailable      RN Documentation    [address] Brunner Name, Adelso Rooney      Follow-up Information    None       Patient's Medications   Discharge Prescriptions    No medications on file     No discharge procedures on file         ED Provider  Electronically Signed by     Kay Wolf DO  01/12/23 7846

## 2023-01-13 PROBLEM — F84.0 AUTISTIC DISORDER: Status: ACTIVE | Noted: 2023-01-13

## 2023-01-13 PROBLEM — F20.9 SCHIZOPHRENIA (HCC): Status: ACTIVE | Noted: 2023-01-13

## 2023-01-13 PROBLEM — N40.0 BPH (BENIGN PROSTATIC HYPERPLASIA): Status: ACTIVE | Noted: 2023-01-13

## 2023-01-13 LAB
ATRIAL RATE: 72 BPM
GLUCOSE SERPL-MCNC: 97 MG/DL (ref 65–140)
P AXIS: 85 DEGREES
PR INTERVAL: 172 MS
QRS AXIS: 90 DEGREES
QRSD INTERVAL: 88 MS
QT INTERVAL: 404 MS
QTC INTERVAL: 442 MS
T WAVE AXIS: 64 DEGREES
VENTRICULAR RATE: 72 BPM

## 2023-01-13 RX ORDER — TAMSULOSIN HYDROCHLORIDE 0.4 MG/1
0.4 CAPSULE ORAL
Status: DISCONTINUED | OUTPATIENT
Start: 2023-01-13 | End: 2023-01-27 | Stop reason: HOSPADM

## 2023-01-13 RX ORDER — CYCLOSPORINE 0.5 MG/ML
1 EMULSION OPHTHALMIC EVERY 12 HOURS
Status: ON HOLD | COMMUNITY
Start: 2022-10-28 | End: 2023-01-26 | Stop reason: SDUPTHER

## 2023-01-13 RX ORDER — TAMSULOSIN HYDROCHLORIDE 0.4 MG/1
0.4 CAPSULE ORAL
Status: ON HOLD | COMMUNITY
Start: 2022-12-14 | End: 2023-01-26 | Stop reason: SDUPTHER

## 2023-01-13 RX ORDER — RISPERIDONE 2 MG/1
2 TABLET ORAL
Status: DISCONTINUED | OUTPATIENT
Start: 2023-01-13 | End: 2023-01-27 | Stop reason: HOSPADM

## 2023-01-13 RX ORDER — DOCUSATE SODIUM 100 MG/1
100 CAPSULE, LIQUID FILLED ORAL 2 TIMES DAILY
Status: DISCONTINUED | OUTPATIENT
Start: 2023-01-13 | End: 2023-01-27 | Stop reason: HOSPADM

## 2023-01-13 RX ADMIN — TAMSULOSIN HYDROCHLORIDE 0.4 MG: 0.4 CAPSULE ORAL at 16:51

## 2023-01-13 RX ADMIN — DIVALPROEX SODIUM 500 MG: 500 TABLET, EXTENDED RELEASE ORAL at 09:44

## 2023-01-13 RX ADMIN — GLYCERIN, HYPROMELLOSE, POLYETHYLENE GLYCOL 1 DROP: .2; .2; 1 LIQUID OPHTHALMIC at 16:50

## 2023-01-13 RX ADMIN — RISPERIDONE 2 MG: 2 TABLET ORAL at 21:14

## 2023-01-13 RX ADMIN — DIVALPROEX SODIUM 500 MG: 500 TABLET, EXTENDED RELEASE ORAL at 21:15

## 2023-01-13 NOTE — PLAN OF CARE
Problem: Ineffective Coping  Goal: Cooperates with admission process  Description: Interventions:   - Complete admission process  Outcome: Progressing   New admission as of 1/12/2023

## 2023-01-13 NOTE — NURSING NOTE
Patient admitted to [facility]  from [facility] ED with a valid 201  Physical assessment completed, no wounds noted  Bill of Rights and HIPPA regulations reviewed and signed  Patient tolerated admission process  Admission medication and diet ordered  Patient oriented to unit  Low CSSRS noted  Pt feels safe while on the unit, Denies anxiety depression SI/HI  Endorses AH of "noises all the time "   Patient  mood anxious  Stating he is here "because the vacuum I was cleaning my car and house with went through me " Also stating " I have a thought disorder and autism " Patient is delusional, lacking insight  Fluids and nutrition offered  Monitoring continues  Q 7 minute safety checks initiated

## 2023-01-13 NOTE — NURSING NOTE
Patient calm, pleasant, and cooperative  He makes good eye contact and speech is somewhat delayed  He endorses mild depression at time of assessment, but denies anxiety, hallucinations, and SI/HI  Does not appear internally preoccupied  Did not verbalize any delusional thoughts to this RN  No complaints of pain  Compliant with medications  Will continue monitoring

## 2023-01-13 NOTE — PROGRESS NOTES
PT came with the followin blue long sleeve shirt  1 pr white socks  1 pr blue jeans  1 hankie  1 men's watch      Locked in patients canomet    1 brown belt  1 pr grey sneakers  1 green jacket      Behind nurses station    1 orbit flip cell phone with black   1 car remote with a key    Security has  [id number]    Bag # [id number] & [id number]    Patient signed for all the above

## 2023-01-13 NOTE — H&P
Psychiatric Evaluation - Behavioral Health   This note was not shared with the patient due to reasonable likelihood of causing patient harm  Identification Data:Doc Beavers 58 y o  male MRN: [id number]  Unit/Bed#: [facility] 203-01 Encounter: [id number]    Chief Complaint: suicidal ideation, unstable mood and delusional thoughts    History of Present Illness     Barb Cordero is a 58 y o  male with a history of Schizophrenia who was admitted to the inpatient adult psychiatric unit on a voluntary 201 commitment basis due to depression, anxiety, unstable mood, paranoid ideation and inability to care for self  Patient apparently presented to ED via EMS after calling 911 and making statement that "he wanted to die because he was skin and bone and has no blood in him"  UDS was negative  EKG with no acute changes  On evaluation in the inpatient psychiatric unit Toya Navarrete presents as very limited historian but able to answer questions concretely  States he came to the hospital because "I have thought disorder"  Patient admits that he had not been eating or sleeping well and experiencing increased paranoia and auditory hallucination without command for the past several weeks  Denies any current suicidal ideation or homicidal ideation and is able to contract for safety  Denies any history of alcohol or illicit drug use  He is unable to name any of his psychiatric medication but states he has a brother Lucinda Patel who is very supportive  According to record, patient has been followed by [facility] and his last [place] 234 mg IM was given some time this week  Patient agreed to be compliant with medication and treatment plan      Psychiatric Review Of Systems:    Sleep changes: decreased  Appetite changes:no  Weight changes: decreased  Energy: decreased  Interest/pleasure/: yes  Anhedonia: yes  Anxiety: yes  Carmela: history of mood swings  Guilt:  no  Hopeless:  no  Self injurious behavior/risky behavior: no  Suicidal ideation: yes, no plan  Homicidal ideation: no  Auditory hallucinations: yes, vague auditory hallucinations  Visual hallucinations: no  Delusional thinking: paranoid thoughts  Eating disorder history: no  Obsessive/compulsive symptoms: no    Historical Information     Past Psychiatric History:     Past Inpatient Psychiatric Treatment:   Several past inpatient psychiatric admissions  Past Outpatient Psychiatric Treatment:    Currently in outpatient psychiatric treatment with [facility]  Past Suicide Attempts: denies  Past Violent Behavior: denies  Past Psychiatric Medication Trials: Risperdal, Invega Sustena, Depakote, Lithium     Substance Abuse History:    Social History     Tobacco History     Smoking Status  Every Day Smoking Tobacco Type  Cigars    Smokeless Tobacco Use  Never          Alcohol History     Alcohol Use Status  Not Currently Comment  rare          Drug Use     Drug Use Status  Not Currently Comment  "i used to"          Sexual Activity     Sexually Active  Not Currently          Activities of Daily Living    Not Asked               Additional Substance Use Detail     Questions Responses    Problems Due to Past Use of Alcohol? No    Problems Due to Past Use of Substances? No    Substance Use Assessment Substance use within the past 12 months    Alcohol Use Frequency Denies use in past 12 months    Cannabis frequency Never used    Comment:  Never used on 1/12/2023     Not reviewed          I have assessed this patient for substance use within the past 12 months    Alcohol use: denies current use  Recreational drug use: denies current use    Family Psychiatric History: unknown    Social History:    Education: high school graduate, special education  Marital History: single  Children: none  Living Arrangement: lives alone  Occupational History: on permanent disability  Functioning Relationships: brother is supportive  Legal History: none   History: None    Traumatic History:   Yes     Past Medical History:      No past medical history on file  No past surgical history on file  Medical Review Of Systems:    Pertinent items are noted in HPI  Allergies: Allergies   Allergen Reactions   • Atorvastatin Myalgia     Muscle aches and pains, stiffness   • Iodine - Food Allergy Other (See Comments)     unknown       Medications: All current active medications have been reviewed  OBJECTIVE:    Vital signs in last 24 hours:    Temp:  [97 5 °F (36 4 °C)-98 4 °F (36 9 °C)] 97 5 °F (36 4 °C)  HR:  [67-78] 75  Resp:  [16-18] 18  BP: (103-128)/(61-80) 115/61    No intake or output data in the 24 hours ending 01/13/23 1305     Mental Status Evaluation:    Appearance:  age appropriate   Behavior:  calm   Speech:  decreased rate, impoverished   Mood:  depressed, anxious   Affect:  constricted   Language: naming objects   Thought Process:  concrete, poverty of thought   Associations: concrete associations, blocking   Thought Content:  some paranoia, preoccupied   Perceptual Disturbances: vague auditory hallucinations   Risk Potential: Suicidal ideation - None at present  Homicidal ideation - None at present  Potential for aggression - Not at present   Sensorium:  oriented to person and place   Memory:  recent and remote memory: unable to assess due to lack of cooperation   Consciousness:  alert and awake   Attention: attention span and concentration appear shorter than expected for age   Intellect: below average   Fund of Knowledge: awareness of current events: limited   Insight:  poor   Judgment: limited   Muscle Strength Muscle Tone: normal  normal   Gait/Station: normal gait/station   Motor Activity: no abnormal movements       Laboratory Results:   I have personally reviewed all pertinent laboratory/tests results    Most Recent Labs:   Lab Results   Component Value Date    WBC 4 32 01/12/2023    RBC 4 29 01/12/2023    HGB 15 2 01/12/2023    HCT 43 9 01/12/2023     01/12/2023    RDW 13 2 01/12/2023    NEUTROABS 1 92 01/12/2023    SODIUM 138 01/12/2023    K 3 5 01/12/2023     01/12/2023    CO2 27 01/12/2023    BUN 3 (L) 01/12/2023    CREATININE 0 83 01/12/2023    GLUC 110 01/12/2023    GLUF 89 12/07/2020    CALCIUM 8 9 01/12/2023    AST 21 01/12/2023    ALT 16 01/12/2023    ALKPHOS 67 01/12/2023    TP 6 7 01/12/2023    ALB 4 1 01/12/2023    TBILI 0 61 01/12/2023    VALPROICTOT <10 (L) 11/07/2022    HNN4LMXHYYDU 1 840 01/12/2023    HGBA1C 6 1 (H) 01/02/2023     01/02/2023       Imaging Studies:   CT head without contrast    Result Date: 1/12/2023  Narrative: CT BRAIN - WITHOUT CONTRAST INDICATION:   Altered mental status  COMPARISON:  None  TECHNIQUE:  CT examination of the brain was performed  In addition to axial images, sagittal and coronal 2D reformatted images were created and submitted for interpretation  Radiation dose length product (DLP) for this visit:  792 6 mGy-cm   This examination, like all CT scans performed in the [facility], was performed utilizing techniques to minimize radiation dose exposure, including the use of iterative reconstruction and automated exposure control  IMAGE QUALITY:  Diagnostic  FINDINGS: PARENCHYMA:  No intracranial mass, mass effect or midline shift  No CT signs of acute territorial infarction  No acute parenchymal hemorrhage  Gray-white differentiation appears maintained  VENTRICLES AND EXTRA-AXIAL SPACES:  Normal for the patient's age  VISUALIZED ORBITS AND PARANASAL SINUSES:  Normal visualized orbits  Mild mucosal thickening in the bilateral ethmoid sinuses  Visualized paranasal sinuses otherwise are grossly clear  CALVARIUM AND EXTRACRANIAL SOFT TISSUES:  Normal      Impression: No acute intracranial abnormality is seen  Other findings as above   Workstation performed: [id number]       Code Status: No Order  Advance Directive and Living Will: <no information>    Assessment/Plan Principal Problem:    Schizophrenia ([facility] [address] )  Active Problems:    Autistic disorder    BPH (benign prostatic hyperplasia)      Patient Strengths: cooperative, negotiates basic needs, patient is on a voluntary commitment     Patient Barriers: chronic mental illness, poor insight, self-care deficit    Treatment Plan:     Planned Treatment and Medication Changes: All current active medications have been reviewed  Encourage group therapy, milieu therapy and occupational therapy  Behavioral Health checks every 7 minutes  Begin Risperidal 2 mg po hs and Depakote  mg bid  Will need to verify when his last Invega Sustena 234 IM was given to scheduled his next dose  Risks / Benefits of Treatment:    Risks, benefits, and possible side effects of medications explained to patient  Patient has limited understanding of risks and benefits of treatment at this time, but agrees to take medications as prescribed  Counseling / Coordination of Care:    Patient's presentation on admission and proposed treatment plan discussed with treatment team   Diagnosis, medication changes and treatment plan reviewed with patient  Events leading to admission reviewed with patient      Inpatient Psychiatric Certification:    Estimated length of stay: 7 midnights      Jolene Cruz MD 01/13/23

## 2023-01-13 NOTE — PROGRESS NOTES
01/13/23 8576   Activity/Group Checklist   Group   ([facility])   Attendance Did not attend

## 2023-01-13 NOTE — PROGRESS NOTES
01/13/23 1110   Activity/Group Checklist   Group Admission/Discharge   Attendance Attended   Attendance Duration (min) 16-30   Interactions Interacted appropriately   Affect/Mood Appropriate   Goals Achieved Identified feelings; Identified triggers; Identified relapse prevention strategies; Discussed coping strategies; Able to listen to others; Able to engage in interactions; Identified resources and support systems; Able to self-disclose; Able to recieve feedback     Patient agreeable to meeting and completing self assessment and relapse prevention plan  Patient is on unit due to a thought disorder and AH  He struggles with stress and anxiety as well  His brother is a huge support of his as well as his ACT Team   He is hopeful  at discharge he will be able to think a little better, feel less stressed and anxious as well as have his AH controlled  Patient shared likes of music, listening to his radio, watching TV/movies and eating

## 2023-01-13 NOTE — SOCIAL WORK
CM placed call to PT PCP Dr Jesica Anthony,  [phone] to notify of PT admission, spoke with Alfie Munoz, she indicated their team is aware  Call ended mutually  CM placed call to [phone] and spoke with Sharlene [phone]  Updated on PT admission  Spoke with Arron Carter updated him on PT admission  Arron Carter indicated that PT was prescribed Depakote 500mg and Zyprexa 10mg both at HS, invega sustenna every 28 days 234mg, last given on January 9th takes with much encouragement  Has not been compliant with oral medication regimen consistently for several months, due to paranoia  At baseline is suspicious, paranoia but can reality test  Was in [facility] at age 16 for many years, never clears completely but is able to function in daily day to day, able to drive, order food and   Has not been eating more recently  Has been with the ACT team since 2011  Dr Leon Brooks, psych, Paige Common worker, Werner Canter worker, Dhara Steinberg, RN; no therapist  Team sees PT 2-4 times a week, PT lately has been avoidant  Brother is highly involved in PT care, lives on property  Prior to this was evicted from his home with [facility] due to having drug users and prostitute on site, was taking apart fire alarms  Team manages medications  Recent weight loss of 8lbs  A member of the team will be out to see PT in person next week, they will call in advance to schedule, CM in agreement  Call ended mutually  CM placed call to PT brother Annette Baltazar [phone], for family check in  Phone continuously rang, no voicemail, will follow up

## 2023-01-13 NOTE — TREATMENT PLAN
TREATMENT PLAN REVIEW - [id number] 58 y o  1960 male MRN: [id number]    [address]  Room / Bed: [place] [address] Encounter: [id number]          Admit Date/Time:  1/12/2023  9:05 PM    Treatment Team: Attending Provider: Cydney Owen MD; Patient Care Assistant: Elizabeth Leos; Consulting Physician: Jareth Randle MD; Patient Care Technician: Flip Mcnamara; Recreational Therapist: Berto Santa; Patient Care Assistant: Katt Reina; Care Manager: Anjel Jean RN; Patient Care Assistant: Janine Vergara; Registered Nurse: Jacinto Lafleur RN; : [address]; Patient Care Assistant: Nayan Bingham;  Patient Care Assistant: Susi Alfredo    Diagnosis: Principal Problem:    Schizophrenia ([facility] [address] )  Active Problems:    Autistic disorder    BPH (benign prostatic hyperplasia)      Patient Strengths/Assets: negotiates basic needs, patient is on a voluntary commitment    Patient Barriers/Limitations: difficulty adapting, limited education, poor insight    Short Term Goals: decrease in paranoid thoughts, decrease in suicidal thoughts, decrease in level of agitation, ability to stay safe on the unit, improvement in reasoning ability, improvement in self care, sleep improvement, improvement in appetite, mood stabilization, increase in group attendance, increase in socialization with peers on the unit, acceptance of need for psychiatric treatment, acceptance of psychiatric medications    Long Term Goals: improvement in anxiety, stabilization of mood, free of suicidal thoughts, improvement in reasoning ability, improved insight, acceptance of need for psychiatric medications, acceptance of need for psychiatric treatment, acceptance of need for psychiatric follow up after discharge, adequate self care, adequate sleep, adequate appetite, adequate oral intake, appropriate interaction with peers, stable living arrangements upon discharge    Progress Towards Goals: starting psychiatric medications as prescribed    Recommended Treatment: medication management, patient medication education, group therapy, milieu therapy, continued Behavioral Health psychiatric evaluation/assessment process, medical follow up with medical team    Treatment Frequency: daily medication monitoring, group and milieu therapy daily, monitoring through interdisciplinary rounds, monitoring through weekly patient care conferences    Expected Discharge Date: 7 midnights    Discharge Plan: will be determined    Treatment Plan Created/Updated By: Keo Huang MD

## 2023-01-13 NOTE — NUTRITION
01/13/23 1531   Biochemical Data,Medical Tests, and Procedures   Biochemical Data/Medical Tests/Procedures Lab values reviewed; Meds reviewed   Labs (Comment) 1/12 BUN:3  1/3 A1c:6 1%   Meds (Comment) cogentin, depakote, colace, ativan, zyprexa, melatonin, risperdal   Nutrition-Focused Physical Exam   Nutrition-Focused Physical Exam Findings RN skin assessment reviewed; No skin issues documented   Nutrition-Focused Physical Exam Findings Cigar smoker   Medical-Related Concerns shizophrenia, autistic disorder, BPH, delusional disorder   Adequacy of Intake   Nutrition Modality PO   Feeding Route   PO Independent   Current PO Intake   Current Diet Order CCD 2 diet thin liquids   Current Meal Intake %   Estimated calorie intake compared to estimated need Nutrient needs met  PES Statement   Problem No nutrition diagnosis   Recommendations/Interventions   Malnutrition/BMI Present No  (does not meet criteria)   Summary Poor PO, weight loss  CCD 2 diet thin liquids  Meal completions 100%  Patient states his appetite is good  He states he lives alone  He reports no diet plan  When asked how many meals he consumes daily he replied, "Not too many"  He states he has difficulty cooking for himself  He reports he was checking his BG BID  Per notes patient is a poor historian with inability to manage self care  1/12/#; 11/1/#; 9/22/#; 5/4/#; 2/16/#; weight has been stable, no significant weight changes  Skin intact  Recommend increase to CCD 3 diet thin liquids  Monitor intake  Interventions/Recommendations Adjust diet order   Intervention Comments CCD 3 diet thin liquids     Education Assessment   Education Education not indicated at this time;Patient/caregiver not appropriate for education at this time   Nutrition Complexity Risk   Nutrition complexity level Moderate risk   Follow up date 01/23/23

## 2023-01-13 NOTE — H&P
Chong Payne  QOR:[birth year] Ralph Prince  XGE:[id number]    SXX:[id number]  Adm Date: 1/12/2023 2105  9:05 PM   ATT PHY: Uriel Sanchez, [address]         Chief Complaint: worsening psychotic symptoms    History of Presenting Illness: Melita Martinez is a(n) 58 y o  male who is admitted to [place] [address] on voluntary 201 commitment basis  Patient originally presented to [facility] ED on 1/12/2023 via EMS for worsening psychotic symptoms  Patient examined at bedside  Patient is a poor historian  Patient complaining of constipation  States he is unsure when his last BM was  Does not take anything at home for constipation  Reports appetite is good  Is passing flatus  Denies any abdominal pain, nausea, vomiting  Allergies   Allergen Reactions   • Atorvastatin Myalgia     Muscle aches and pains, stiffness   • Iodine - Food Allergy Other (See Comments)     unknown     No current facility-administered medications on file prior to encounter       Current Outpatient Medications on File Prior to Encounter   Medication Sig Dispense Refill   • Invega Sustenna 234 MG/1 5ML IM injection      • atorvastatin (LIPITOR) 20 mg tablet  (Patient not taking: Reported on 11/7/2022)     • divalproex sodium (DEPAKOTE) 500 mg EC tablet Take 500 mg by mouth 2 (two) times a day (Patient not taking: Reported on 11/7/2022)     • ibuprofen (MOTRIN) 600 mg tablet Take 1 tablet (600 mg total) by mouth 3 (three) times a day for 10 days 30 tablet 0   • ipratropium-albuterol (Combivent Respimat) inhaler Inhale 1 puff 4 (four) times a day as needed     • meloxicam (MOBIC) 15 mg tablet  (Patient not taking: Reported on 11/7/2022)     • metFORMIN (GLUCOPHAGE) 500 mg tablet  (Patient not taking: Reported on 11/7/2022)     • OneTouch Ultra test strip      • risperiDONE (RisperDAL) 1 mg tablet Take 4 mg by mouth daily (Patient not taking: Reported on 11/7/2022) • Tradjenta 5 MG TABS  (Patient not taking: Reported on 11/7/2022)       Active Ambulatory Problems     Diagnosis Date Noted   • Other constipation 03/10/2021   • Delusional disorder ([facility] [address] ) 03/10/2021     Resolved Ambulatory Problems     Diagnosis Date Noted   • Screening for colon cancer 03/10/2021     No Additional Past Medical History     No past surgical history on file  Social History:   Social History     Socioeconomic History   • Marital status: Single     Spouse name: Not on file   • Number of children: Not on file   • Years of education: Not on file   • Highest education level: Not on file   Occupational History   • Not on file   Tobacco Use   • Smoking status: Every Day     Types: Cigars   • Smokeless tobacco: Never   Vaping Use   • Vaping Use: Every day   • Substances: Nicotine   Substance and Sexual Activity   • Alcohol use: Not Currently     Comment: rare   • Drug use: Not Currently     Comment: "i used to"   • Sexual activity: Not Currently   Other Topics Concern   • Not on file   Social History Narrative   • Not on file     Social Determinants of Health     Financial Resource Strain: Not on file   Food Insecurity: Not on file   Transportation Needs: Not on file   Physical Activity: Not on file   Stress: Not on file   Social Connections: Not on file   Intimate Partner Violence: Not on file   Housing Stability: Not on file     Family History: No family history on file  Review of Systems   Constitutional: Negative for chills and fever  HENT: Negative for ear discharge and sore throat  Eyes: Negative for pain and visual disturbance  Respiratory: Negative for cough and shortness of breath  Cardiovascular: Negative for chest pain and palpitations  Gastrointestinal: Positive for constipation  Negative for abdominal pain, diarrhea and vomiting  Genitourinary: Negative for difficulty urinating, dysuria and hematuria  Musculoskeletal: Negative for arthralgias and back pain     Skin: Negative for color change and rash  Neurological: Negative for dizziness, light-headedness and headaches  Physical Exam   Constitutional: Awake, alert, in no acute distress  Head: Normocephalic and atraumatic  Mouth/Throat: Oropharynx is clear and moist     Eyes: Conjunctivae and EOM are normal   Neck: Neck supple  No tracheal deviation present  No thyromegaly present  Cardiovascular: Normal rate, regular rhythm and normal heart sounds  Pulmonary/Chest: Effort normal and breath sounds normal    Abdominal: Soft  Bowel sounds are normal  No distension  No tenderness  Neurological: Awake, alert, gait steady, moving all extremities  Musculoskeletal:  Nontender spine  Skin: Skin is warm and dry  Assessment     Evalina Door is a(n) 58 y o  male with schizophrenia  1  Type 2 diabetes mellitus  Diet controlled  Patient not on medic medication  Hgb a1c 6 1% on 1/3/2023  Carb controlled diet  Accu-Cheks twice daily  2  BPH  Continue Flomax 0 4 mg daily  3  Chronic bronchitis  Stable  Patient may use albuterol inhaler as needed  4  DJD/OA  Patient may take Tylenol as needed  5  Constipation  Patient started on Colace 100 mg twice daily  6  Psych with schizophrenia  This is being managed by the psych team     Prognosis: Fair  Discharge Plan: In progress  Advanced Directives: I have discussed in detail with the patient the advanced directives  The patient does not have an appointed POA and does not have a living will  Patient's emergency contact is his brother, Benjamin Liz, his number is [phone]  When discussing cardiac and pulmonary resuscitation efforts with the patient, the patient wishes to be FULL CODE  I have spent more than 50 minutes gathering data, doing physical examination, and discussing the advanced directives, which was witnessed by caring staff  The patient was discussed with Dr Dmitry Donald and he is in agreement with the above note

## 2023-01-13 NOTE — NURSING NOTE
Patient was appears to have slept through the night with out issue  No acute behaviors noted  No further complaints voiced  Will continue to monitor   q 7 minute safety checks in place

## 2023-01-13 NOTE — PROGRESS NOTES
01/13/23 0730   Activity/Group Checklist   Group Community meeting   Attendance Attended   Attendance Duration (min) 46-60   Interactions Interacted appropriately   Affect/Mood Appropriate   Goals Achieved Identified feelings; Able to listen to others; Able to engage in interactions; Able to self-disclose; Able to recieve feedback

## 2023-01-13 NOTE — PROGRESS NOTES
01/13/23 1200   Team Meeting   Meeting Type Tx Team Meeting   Team Members Present   Team Members Present Physician;;Nurse   Physician Team Member Dr Ellen Sanchez MD   Nursing Team Member Laurie Wells, FAREED   Care Management Team Member MS Dheeraj, [facility]   Patient/Family Present   Patient Present Yes   Patient's Family Present No     Reviewed TX plan and goals, all in agreement and signed

## 2023-01-13 NOTE — SOCIAL WORK
CM met with PT for PT check in  Completed psych soc  PT denies SI/HI/AH/VH; denies strengths; stressors mental health; coping skills tv, radio, walking, exorcising; HX of schizophrenia; reports taking medications multiple reports compliance; denies access to firearms; denies HX of violence to self and to others; denies HX of abuse and trauma; denies family HX of mental health/suicide/homicide/substance abuse/dementia; denies HX of substance abuse; denies smoking; denies other addictions past or present; denies HX of arrests probation, parole, denies current legal issues; heterosexual; single; no pets; parents are ; has 1 brother Prabhjot Hosea whom is a support; and 1 sister in [place]; able to return to his home; 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

## 2023-01-14 LAB
25(OH)D3 SERPL-MCNC: 32.3 NG/ML (ref 30–100)
FOLATE SERPL-MCNC: 18.9 NG/ML (ref 3.1–17.5)
GLUCOSE SERPL-MCNC: 113 MG/DL (ref 65–140)
GLUCOSE SERPL-MCNC: 118 MG/DL (ref 65–140)
VIT B12 SERPL-MCNC: 585 PG/ML (ref 100–900)

## 2023-01-14 RX ORDER — POLYETHYLENE GLYCOL 3350 17 G/17G
17 POWDER, FOR SOLUTION ORAL DAILY PRN
Status: DISCONTINUED | OUTPATIENT
Start: 2023-01-14 | End: 2023-01-27 | Stop reason: HOSPADM

## 2023-01-14 RX ADMIN — DIVALPROEX SODIUM 500 MG: 500 TABLET, EXTENDED RELEASE ORAL at 09:26

## 2023-01-14 RX ADMIN — GLYCERIN, HYPROMELLOSE, POLYETHYLENE GLYCOL 1 DROP: .2; .2; 1 LIQUID OPHTHALMIC at 09:37

## 2023-01-14 RX ADMIN — DOCUSATE SODIUM 100 MG: 100 CAPSULE, LIQUID FILLED ORAL at 17:25

## 2023-01-14 RX ADMIN — POLYETHYLENE GLYCOL 3350 17 G: 17 POWDER, FOR SOLUTION ORAL at 13:05

## 2023-01-14 RX ADMIN — GLYCERIN, HYPROMELLOSE, POLYETHYLENE GLYCOL 1 DROP: .2; .2; 1 LIQUID OPHTHALMIC at 17:25

## 2023-01-14 RX ADMIN — DOCUSATE SODIUM 100 MG: 100 CAPSULE, LIQUID FILLED ORAL at 09:26

## 2023-01-14 RX ADMIN — RISPERIDONE 2 MG: 2 TABLET ORAL at 21:23

## 2023-01-14 RX ADMIN — DIVALPROEX SODIUM 500 MG: 500 TABLET, EXTENDED RELEASE ORAL at 21:23

## 2023-01-14 RX ADMIN — TAMSULOSIN HYDROCHLORIDE 0.4 MG: 0.4 CAPSULE ORAL at 17:25

## 2023-01-14 NOTE — PLAN OF CARE
Problem: Alteration in Thoughts and Perception  Goal: Treatment Goal: Gain control of psychotic behaviors/thinking, reduce/eliminate presenting symptoms and demonstrate improved reality functioning upon discharge  Outcome: Progressing  Goal: Verbalize thoughts and feelings  Description: Interventions:  - Promote a nonjudgmental and trusting relationship with the patient through active listening and therapeutic communication  - Assess patient's level of functioning, behavior and potential for risk  - Engage patient in 1 on 1 interactions  - Encourage patient to express fears, feelings, frustrations, and discuss symptoms    - [place] patient to reality, help patient recognize reality-based thinking   - Administer medications as ordered and assess for potential side effects  - Provide the patient education related to the signs and symptoms of the illness and desired effects of prescribed medications  Outcome: Progressing  Goal: Refrain from acting on delusional thinking/internal stimuli  Description: Interventions:  - Monitor patient closely, per order   - Utilize least restrictive measures   - Set reasonable limits, give positive feedback for acceptable   - Administer medications as ordered and monitor of potential side effects  Outcome: Progressing  Goal: Agree to be compliant with medication regime, as prescribed and report medication side effects  Description: Interventions:  - Offer appropriate PRN medication and supervise ingestion; conduct AIMS, as needed   Outcome: Progressing  Goal: Attend and participate in unit activities, including therapeutic, recreational, and educational groups  Description: Interventions:  -Encourage Visitation and family involvement in care  Outcome: Progressing  Goal: Recognize dysfunctional thoughts, communicate reality-based thoughts at the time of discharge  Description: Interventions:  - Provide medication and psycho-education to assist patient in compliance and developing insight into his/her illness   Outcome: Progressing     Problem: Depression  Goal: Treatment Goal: Demonstrate behavioral control of depressive symptoms, verbalize feelings of improved mood/affect, and adopt new coping skills prior to discharge  Outcome: Progressing     Problem: Anxiety  Goal: Anxiety is at manageable level  Description: Interventions:  - Assess and monitor patient's anxiety level  - Monitor for signs and symptoms (heart palpitations, chest pain, shortness of breath, headaches, nausea, feeling jumpy, restlessness, irritable, apprehensive)  - Collaborate with interdisciplinary team and initiate plan and interventions as ordered    - [place] patient to unit/surroundings  - Explain treatment plan  - Encourage participation in care  - Encourage verbalization of concerns/fears  - Identify coping mechanisms  - Assist in developing anxiety-reducing skills  - Administer/offer alternative therapies  - Limit or eliminate stimulants  Outcome: Progressing

## 2023-01-14 NOTE — NURSING NOTE
Miralax and MOM ordered PRN for complaints of constipation  Miralax administered at 1305  Will monitor for effectiveness

## 2023-01-14 NOTE — NURSING NOTE
No acute behaviors overnight  No noted suicidal ideations or homicidal behaviors  Fluids at bedside to promote hydration  No aspiration risks noted  Patient observed sleeping during most q 7 minute safety checks  No observable distress overnight  Will continue to monitor

## 2023-01-14 NOTE — NURSING NOTE
Patient visible out and about in the community  He is pleasant and brightens in conversation  Endorses mild depression but denies anxiety, hallucinations, and SI/HI  No complaints of pain  Takes medications without difficulty  Appetite is good  Will continue monitoring

## 2023-01-14 NOTE — PROGRESS NOTES
Progress Note - Behavioral Health   Remonia Peaks 58 y o  male MRN: [id number]  Unit/Bed#: [facility] 203-01 Encounter: [id number]    Assessment/Plan   Principal Problem:    Schizophrenia ([address] )  Active Problems:    Autistic disorder    BPH (benign prostatic hyperplasia)      Subjective:Patient was seen today for continuation of care, records reviewed and  patient was discussed with the morning case review team   Patient seen while lying in bed comfortably, he is pleasant upon approach, alert and oriented to person and place  Asked if he was in [address] and was correct with today's date and time  Had difficulty expressing his reason for psychiatric mission and appears to be a poor historian  According to records, recently admitted for paranoid ideation and inability to care for self  He denies all psychotic symptoms at this time including hallucinations, paranoia, delusions  Admits to mild depression related to his physical health  Reports feeling safe and comfortable in the hospital   Denies suicidal or homicidal ideation  Appears to be tolerating Depakote 500 mg twice daily and Risperdal 2 mg at at bedtime with no reported side effects  Remains medication compliance  Denies any side effects from medications      Psychiatric Review of Systems:    Sleep: slept off and on  Appetite: fair  Medication side effects: No   ROS: all other systems are negative    Vitals:  Vitals:    01/14/23 0748   BP: 113/68   Pulse: 60   Resp: 16   Temp: 98 6 °F (37 °C)   SpO2: 95%       Mental Status Evaluation:    Appearance:  age appropriate, casually dressed   Behavior:  cooperative, calm   Speech:  decreased rate   Mood:  depressed   Affect:  constricted   Thought Process:  concrete   Associations: concrete associations   Thought Content:  some paranoia   Perceptual Disturbances: denies auditory hallucinations when asked   Risk Potential: Suicidal ideation - None at present  Homicidal ideation - None at present  Potential for aggression - No   Sensorium:  oriented to person and place   Memory:  recent and remote memory: unable to assess due to lack of cooperation   Consciousness:  alert and awake   Attention: attention span and concentration appear shorter than expected for age   Insight:  limited   Judgment: limited   Gait/Station: in bed   Motor Activity: no abnormal movements     Laboratory results:    I have personally reviewed all pertinent laboratory/tests results  Most Recent Labs:   Lab Results   Component Value Date    WBC 4 32 01/12/2023    RBC 4 29 01/12/2023    HGB 15 2 01/12/2023    HCT 43 9 01/12/2023     01/12/2023    RDW 13 2 01/12/2023    NEUTROABS 1 92 01/12/2023    SODIUM 138 01/12/2023    K 3 5 01/12/2023     01/12/2023    CO2 27 01/12/2023    BUN 3 (L) 01/12/2023    CREATININE 0 83 01/12/2023    GLUC 110 01/12/2023    GLUF 89 12/07/2020    CALCIUM 8 9 01/12/2023    AST 21 01/12/2023    ALT 16 01/12/2023    ALKPHOS 67 01/12/2023    TP 6 7 01/12/2023    ALB 4 1 01/12/2023    TBILI 0 61 01/12/2023    VALPROICTOT <10 (L) 11/07/2022    EWJ0JVHGCIJS 1 840 01/12/2023    HGBA1C 6 1 (H) 01/02/2023     01/02/2023     Depakote:   Lab Results   Component Value Date    VALPROICTOT <10 (L) 11/07/2022       Progress Toward Goals:     unchanged    Recommended Treatment:   Recently initiated Depakote 500 mg twice daily and Risperdal 2 mg at bedtime on 1/13, tolerating with no side effects        All current active medications have been reviewed  Encourage group therapy, milieu therapy and occupational therapy  Continue treatment with group therapy, milieu therapy and occupational therapy  Behavioral Health checks every 7 minutes  Continue current medications:  Current Facility-Administered Medications   Medication Dose Route Frequency Provider Last Rate   • acetaminophen  650 mg Oral Q4H PRN Ramya Marcos MD     • acetaminophen  650 mg Oral Q4H PRN Ramya Marcos MD     • acetaminophen  975 mg Oral Q6H PRN Bentley Burton MD     • benztropine  0 5 mg Oral Q4H PRN Max 6/day Bentley Burton MD     • divalproex sodium  500 mg Oral BID Bentley Burton MD     • docusate sodium  100 mg Oral BID Carolina Stallings PA-C     • glycerin-hypromellose-  1 drop Both Eyes BID Carolina Stallings PA-C     • hydrOXYzine HCL  25 mg Oral Q6H PRN Max 4/day Bentley Burton MD     • LORazepam  1 mg Intramuscular Q6H PRN Max 3/day Bentley Burton MD     • LORazepam  0 5 mg Oral Q6H PRN Max 4/day Bentley Burton MD     • LORazepam  1 mg Oral Q6H PRN Max 3/day Bentley Burton MD     • melatonin  3 mg Oral HS PRN Bentley Burton MD     • OLANZapine  5 mg Intramuscular Q3H PRN Max 3/day Bentley Burton MD     • OLANZapine  2 5 mg Oral Q4H PRN Max 6/day Bentley Burton MD     • OLANZapine  5 mg Oral Q4H PRN Max 3/day Bentley Burton MD     • OLANZapine  5 mg Oral Q3H PRN Max 3/day Bentley Burton MD     • risperiDONE  2 mg Oral HS Bentley Burton MD     • tamsulosin  0 4 mg Oral Daily With Dinner Carolina Stallings PA-C         Risks / Benefits of Treatment:     Risks, benefits, and possible side effects of medications explained to patient  Patient has limited understanding of risks and benefits of treatment at this time, but agrees to take medications as prescribed  Counseling / Coordination of Care:     Patient's progress reviewed with nursing staff  Medications, treatment progress and treatment plan reviewed with patient  Supportive counseling provided to the patient            RK Washington

## 2023-01-14 NOTE — NURSING NOTE
Patient visible on the unit  Calm and cooperative  Denies SI, HI, hallucinations, anxiety, and pain  Endorses mild depression  Compliant with medications  Laughs inappropriately  Does not appear internally preoccupied  Will continue to monitor and access  Q 7 minute safety checks maintained

## 2023-01-14 NOTE — PROGRESS NOTES
Progress Note - Igor Santiago 58 y o  male MRN: [id number]    Unit/Bed#: [facility] 203-01 Encounter: [id number]        Subjective:   Patient seen and examined at bedside after reviewing the chart and discussing the case with the caring staff  Patient examined at bedside  Patient continues to complain of constipation  Passing flatus  Denies any abdominal pain, nausea, vomiting  Labs pending  Physical Exam   Vitals: Blood pressure 113/68, pulse 60, temperature 98 6 °F (37 °C), temperature source Temporal, resp  rate 16, height 5' 11" (1 803 m), weight 68 7 kg (151 lb 6 4 oz), SpO2 95 %  ,Body mass index is 21 12 kg/m²  Constitutional: Patient in no acute distress  HEENT: PERR, EOMI, MMM  Cardiovascular: Normal rate and regular rhythm  Pulmonary/Chest: Effort normal and breath sounds normal    Abdomen: Soft, + BS, NT, no rebound, no guarding, no rigidity  Assessment/Plan:     Igor Santiago is a(n) 58 y o  male with schizophrenia      1  Type 2 diabetes mellitus  Diet controlled  Patient not on diabetic medication  Hgb a1c 6 1% on 1/2/2023  Carb controlled diet  Accu-Cheks twice daily  2  BPH  Continue Flomax 0 4 mg daily  3  Chronic bronchitis  Stable  Patient may use albuterol inhaler as needed  4  DJD/OA  Patient may take Tylenol as needed  5  Constipation  Patient started on Colace 100 mg twice daily  Add Miralax/MOM as needed  The patient was discussed with Dr Martha Fall and he is in agreement with the above note

## 2023-01-15 LAB — GLUCOSE SERPL-MCNC: 93 MG/DL (ref 65–140)

## 2023-01-15 RX ORDER — MELATONIN
1000 DAILY
Status: DISCONTINUED | OUTPATIENT
Start: 2023-01-16 | End: 2023-01-27 | Stop reason: HOSPADM

## 2023-01-15 RX ADMIN — DOCUSATE SODIUM 100 MG: 100 CAPSULE, LIQUID FILLED ORAL at 17:51

## 2023-01-15 RX ADMIN — TAMSULOSIN HYDROCHLORIDE 0.4 MG: 0.4 CAPSULE ORAL at 17:51

## 2023-01-15 RX ADMIN — DIVALPROEX SODIUM 500 MG: 500 TABLET, EXTENDED RELEASE ORAL at 09:11

## 2023-01-15 RX ADMIN — DOCUSATE SODIUM 100 MG: 100 CAPSULE, LIQUID FILLED ORAL at 09:11

## 2023-01-15 RX ADMIN — DIVALPROEX SODIUM 500 MG: 500 TABLET, EXTENDED RELEASE ORAL at 21:10

## 2023-01-15 RX ADMIN — GLYCERIN, HYPROMELLOSE, POLYETHYLENE GLYCOL 1 DROP: .2; .2; 1 LIQUID OPHTHALMIC at 09:11

## 2023-01-15 RX ADMIN — GLYCERIN, HYPROMELLOSE, POLYETHYLENE GLYCOL 1 DROP: .2; .2; 1 LIQUID OPHTHALMIC at 17:51

## 2023-01-15 RX ADMIN — RISPERIDONE 2 MG: 2 TABLET ORAL at 21:10

## 2023-01-15 NOTE — NURSING NOTE
Patient out for meals but is otherwise withdrawn to room  He is pleasant, bright, and laughing during conversation  He endorses mild depression and states he feels "lonely" here  Was encouraged to socialize  He denies feeling anxious  Endorses AH of hearing "noise " Denies VH at this time  No complaints of pain  Takes medications without difficulty  Will continue monitoring

## 2023-01-15 NOTE — PROGRESS NOTES
Progress Note - Behavioral Health   Anthony Councilman 58 y o  male MRN: [id number]  Unit/Bed#: [facility] 203-01 Encounter: [id number]    Assessment/Plan   Principal Problem:    Schizophrenia ([address] )  Active Problems:    Autistic disorder    BPH (benign prostatic hyperplasia)      Subjective:Patient was seen today for continuation of care, records reviewed and  patient was discussed with the morning case review team  No behavioral changes over the past 24 hours, remains calm and cooperative in milieu  Slow and delayed responses with difficulty expressing his thoughts  Asking appropriate questions regarding his current medications and has remained compliant, denies all adverse effects  Sleeping well with as needed trazodone and appetite remains within normal limits  Pending valproic acid level on 1/17/2023  Patient denies endorsing any suicidal or homicidal ideation       Psychiatric Review of Systems:    Sleep: normal  Appetite: fair  Medication side effects: No   ROS: all other systems are negative    Vitals:  Vitals:    01/15/23 0747   BP: 107/57   Pulse: 55   Resp: 16   Temp: 98 9 °F (37 2 °C)   SpO2: 94%       Mental Status Evaluation:    Appearance:  casually dressed, dressed appropriately   Behavior:  cooperative, calm   Speech:  decreased volume   Mood:  depressed   Affect:  constricted   Thought Process:  concrete   Associations: concrete associations   Thought Content:  some paranoia   Perceptual Disturbances: denies auditory hallucinations when asked   Risk Potential: Suicidal ideation - None  Homicidal ideation - None  Potential for aggression - No   Sensorium:  oriented to person and place   Memory:  recent and remote memory grossly intact   Consciousness:  alert and awake   Attention: attention span and concentration appear shorter than expected for age   Insight:  limited   Judgment: limited   Gait/Station: in bed   Motor Activity: no abnormal movements     Laboratory results:    I have personally reviewed all pertinent laboratory/tests results    Most Recent Labs:   Lab Results   Component Value Date    WBC 4 32 01/12/2023    RBC 4 29 01/12/2023    HGB 15 2 01/12/2023    HCT 43 9 01/12/2023     01/12/2023    RDW 13 2 01/12/2023    NEUTROABS 1 92 01/12/2023    SODIUM 138 01/12/2023    K 3 5 01/12/2023     01/12/2023    CO2 27 01/12/2023    BUN 3 (L) 01/12/2023    CREATININE 0 83 01/12/2023    GLUC 110 01/12/2023    GLUF 89 12/07/2020    CALCIUM 8 9 01/12/2023    AST 21 01/12/2023    ALT 16 01/12/2023    ALKPHOS 67 01/12/2023    TP 6 7 01/12/2023    ALB 4 1 01/12/2023    TBILI 0 61 01/12/2023    VALPROICTOT <10 (L) 11/07/2022    TCV0TASMHPAO 1 840 01/12/2023    HGBA1C 6 1 (H) 01/02/2023     01/02/2023     Depakote:   Lab Results   Component Value Date    VALPROICTOT <10 (L) 11/07/2022       Recommended Treatment:     -Recently initiated Depakote 500 mg twice daily and Risperdal 2 mg at bedtime on 1/13, tolerating with no side effects, pending level 1/17/23     All current active medications have been reviewed  Encourage group therapy, milieu therapy and occupational therapy  Continue treatment with group therapy, milieu therapy and occupational therapy  Behavioral Health checks every 7 minutes  Continue current medications:  Current Facility-Administered Medications   Medication Dose Route Frequency Provider Last Rate   • acetaminophen  650 mg Oral Q4H PRN Rekha Salamanca MD     • acetaminophen  650 mg Oral Q4H PRN Rekha Salamanca MD     • acetaminophen  975 mg Oral Q6H PRN Rekha Salamanca MD     • benztropine  0 5 mg Oral Q4H PRN Max 6/day Rekha Salamanca MD     • divalproex sodium  500 mg Oral BID Rekha Salamanca MD     • docusate sodium  100 mg Oral BID Carolina Stallings PA-C     • glycerin-hypromellose-  1 drop Both Eyes BID Carolina Stallings PA-C     • hydrOXYzine HCL  25 mg Oral Q6H PRN Max 4/day Rekha Salamanca MD     • LORazepam  1 mg Intramuscular Q6H PRN Max 3/day Rekha Salamanca MD     • LORazepam 0 5 mg Oral Q6H PRN Max 4/day Tomasa Cockayne, MD     • LORazepam  1 mg Oral Q6H PRN Max 3/day Tomasa Cockayne, MD     • magnesium hydroxide  30 mL Oral Daily PRN Carolina Stallings PA-C     • melatonin  3 mg Oral HS PRN Tomasa Cockayne, MD     • OLANZapine  5 mg Intramuscular Q3H PRN Max 3/day Tomasa Cockayne, MD     • OLANZapine  2 5 mg Oral Q4H PRN Max 6/day Tomasa Cockayne, MD     • OLANZapine  5 mg Oral Q4H PRN Max 3/day Tomasa Cockayne, MD     • OLANZapine  5 mg Oral Q3H PRN Max 3/day Tomasa Cockayne, MD     • polyethylene glycol  17 g Oral Daily PRN Carolina Stallings PA-C     • risperiDONE  2 mg Oral HS Tomasa Cockayne, MD     • tamsulosin  0 4 mg Oral Daily With Dinner Carolina Stallings PA-C         Risks / Benefits of Treatment:     Risks, benefits, and possible side effects of medications explained to patient  Patient has limited understanding of risks and benefits of treatment at this time, but agrees to take medications as prescribed  Counseling / Coordination of Care:     Patient's progress reviewed with nursing staff  Medications, treatment progress and treatment plan reviewed with patient  Supportive counseling provided to the patient            RK Lopez

## 2023-01-15 NOTE — NURSING NOTE
At dinnertime, patient asked if he had a BM after receiving PRN Miralax  He does report a medium sized BM that was unwitnessed by staff

## 2023-01-15 NOTE — NURSING NOTE
Patient visible on the unit  Cooperative and pleasant  Denies SI, HI, hallucinations, anxiety, and depression  Compliant with evening medications  Participated in snack  Will continue to monitor and access  Q 7 minute safety checks maintained

## 2023-01-16 LAB
GLUCOSE SERPL-MCNC: 104 MG/DL (ref 65–140)
GLUCOSE SERPL-MCNC: 131 MG/DL (ref 65–140)

## 2023-01-16 RX ADMIN — GLYCERIN, HYPROMELLOSE, POLYETHYLENE GLYCOL 1 DROP: .2; .2; 1 LIQUID OPHTHALMIC at 17:07

## 2023-01-16 RX ADMIN — RISPERIDONE 2 MG: 2 TABLET ORAL at 21:13

## 2023-01-16 RX ADMIN — DIVALPROEX SODIUM 500 MG: 500 TABLET, EXTENDED RELEASE ORAL at 21:13

## 2023-01-16 RX ADMIN — DOCUSATE SODIUM 100 MG: 100 CAPSULE, LIQUID FILLED ORAL at 08:50

## 2023-01-16 RX ADMIN — GLYCERIN, HYPROMELLOSE, POLYETHYLENE GLYCOL 1 DROP: .2; .2; 1 LIQUID OPHTHALMIC at 08:51

## 2023-01-16 RX ADMIN — DOCUSATE SODIUM 100 MG: 100 CAPSULE, LIQUID FILLED ORAL at 17:07

## 2023-01-16 RX ADMIN — DIVALPROEX SODIUM 500 MG: 500 TABLET, EXTENDED RELEASE ORAL at 08:50

## 2023-01-16 RX ADMIN — Medication 1000 UNITS: at 08:50

## 2023-01-16 RX ADMIN — TAMSULOSIN HYDROCHLORIDE 0.4 MG: 0.4 CAPSULE ORAL at 16:23

## 2023-01-16 NOTE — PROGRESS NOTES
01/16/23 0730   Activity/Group Checklist   Group Community meeting   Attendance Attended   Attendance Duration (min) 46-60   Interactions Interacted appropriately   Affect/Mood Appropriate   Goals Achieved Identified feelings; Able to listen to others; Able to engage in interactions; Able to self-disclose; Able to recieve feedback

## 2023-01-16 NOTE — NURSING NOTE
Patient out for snack, then withdrawn to room  Feels sad today  Denies SI, HI, hallucinations, anxiety, and depression  Compliant with medications  Brightens with approach  Q 7 minute safety checks maintained

## 2023-01-16 NOTE — PROGRESS NOTES
Progress Note - Francisco Javier King 58 y o  male MRN: [id number]    Unit/Bed#: [facility] 203-01 Encounter: [id number]        Subjective:   Patient seen and examined at bedside after reviewing the chart and discussing the case with the caring staff  Patient examined at bedside  Patient denies any other complaints at this time  Physical Exam   Vitals: Blood pressure 112/68, pulse 67, temperature 98 8 °F (37 1 °C), temperature source Temporal, resp  rate 18, height 5' 11" (1 803 m), weight 68 7 kg (151 lb 6 4 oz), SpO2 95 %  ,Body mass index is 21 12 kg/m²  Constitutional: Patient in no acute distress  HEENT: PERR, EOMI, MMM  Cardiovascular: Normal rate and regular rhythm  Pulmonary/Chest: Effort normal and breath sounds normal    Abdomen: Soft, + BS, NT  Assessment/Plan:    Francisco Javier King is a(n) 58 y o  male with schizophrenia      1  Type 2 diabetes mellitus  Diet controlled  Patient not on diabetic medication  Hgb a1c 6 1% on 1/2/2023  Carb controlled diet  Accu-Cheks twice daily  2  BPH  Continue Flomax 0 4 mg daily  3  Chronic bronchitis  Stable  Patient may use albuterol inhaler as needed  4  DJD/OA  Patient may take Tylenol as needed  5  Constipation  Patient started on Colace 100 mg twice daily, Miralax/MOM as needed  6  Vitamin D deficiency  Patient started on vitamin D3 1000 units daily

## 2023-01-16 NOTE — NURSING NOTE
Patient visible in milieu, pleasant and cooperative in interaction  Social with staff and peers  Patient denies anxiety/depression, SI/HI, hallucinations  No overt delusions  Remains medication compliant and on 7" checks for safety and behaviors

## 2023-01-16 NOTE — PROGRESS NOTES
01/16/23 3541   Activity/Group Checklist   Group   (Team Building and Collaboration)   Attendance Did not attend

## 2023-01-17 LAB
GLUCOSE SERPL-MCNC: 122 MG/DL (ref 65–140)
GLUCOSE SERPL-MCNC: 134 MG/DL (ref 65–140)
VALPROATE SERPL-MCNC: 75 UG/ML (ref 50–100)

## 2023-01-17 RX ADMIN — DIVALPROEX SODIUM 500 MG: 500 TABLET, EXTENDED RELEASE ORAL at 08:10

## 2023-01-17 RX ADMIN — DOCUSATE SODIUM 100 MG: 100 CAPSULE, LIQUID FILLED ORAL at 08:11

## 2023-01-17 RX ADMIN — RISPERIDONE 2 MG: 2 TABLET ORAL at 21:06

## 2023-01-17 RX ADMIN — GLYCERIN, HYPROMELLOSE, POLYETHYLENE GLYCOL 1 DROP: .2; .2; 1 LIQUID OPHTHALMIC at 16:51

## 2023-01-17 RX ADMIN — GLYCERIN, HYPROMELLOSE, POLYETHYLENE GLYCOL 1 DROP: .2; .2; 1 LIQUID OPHTHALMIC at 08:12

## 2023-01-17 RX ADMIN — TAMSULOSIN HYDROCHLORIDE 0.4 MG: 0.4 CAPSULE ORAL at 16:50

## 2023-01-17 RX ADMIN — DOCUSATE SODIUM 100 MG: 100 CAPSULE, LIQUID FILLED ORAL at 16:54

## 2023-01-17 RX ADMIN — DIVALPROEX SODIUM 500 MG: 500 TABLET, EXTENDED RELEASE ORAL at 21:06

## 2023-01-17 RX ADMIN — Medication 1000 UNITS: at 08:11

## 2023-01-17 NOTE — PROGRESS NOTES
Progress Note - Marques Landin 58 y o  male MRN: [id number]    Unit/Bed#: [facility] 203-01 Encounter: [id number]        Subjective:   Patient seen and examined at bedside after reviewing the chart and discussing the case with the caring staff  Patient examined at bedside  Patient denies any complaints at this time  Reviewed recent labs and CT head results with patient  Physical Exam   Vitals: Blood pressure 109/58, pulse 65, temperature 98 3 °F (36 8 °C), temperature source Temporal, resp  rate 18, height 5' 11" (1 803 m), weight 68 7 kg (151 lb 6 4 oz), SpO2 96 %  ,Body mass index is 21 12 kg/m²  Constitutional: Patient in no acute distress  HEENT: PERR, EOMI, MMM  Cardiovascular: Normal rate and regular rhythm  Pulmonary/Chest: Effort normal and breath sounds normal    Abdomen: Soft, + BS, NT  Assessment/Plan:    Marques Landin is a(n) 58 y o  male with schizophrenia      1  Type 2 diabetes mellitus  Diet controlled  Patient not on diabetic medication  Hgb a1c 6 1% on 1/2/2023  Carb controlled diet  Accu-Cheks twice daily  2  BPH  Continue Flomax 0 4 mg daily  3  Chronic bronchitis  Stable  Patient may use albuterol inhaler as needed  4  DJD/OA  Patient may take Tylenol as needed  5  Constipation  Patient started on Colace 100 mg twice daily, Miralax/MOM as needed  6  Vitamin D deficiency  Patient started on vitamin D3 1000 units daily  The patient was discussed with Dr Wayne Piña and he is in agreement with the above note

## 2023-01-17 NOTE — PROGRESS NOTES
01/17/23 0730   Activity/Group Checklist   Group Community meeting   Attendance Attended   Attendance Duration (min) 46-60   Interactions Interacted appropriately   Affect/Mood Appropriate   Goals Achieved Identified feelings; Able to listen to others; Able to engage in interactions; Able to self-disclose; Able to recieve feedback

## 2023-01-17 NOTE — SOCIAL WORK
CM placed call to [address] [place] [address] [phone], left message for Fransisco to provide update and to arrange for his team to come visit with PT on unit  Requested return call  Call ended mutually

## 2023-01-17 NOTE — PROGRESS NOTES
Progress Note - Behavioral Health   Suhsil Russo 58 y o  male MRN: [id number]  Unit/Bed#: [facility] 203-01 Encounter: [id number]    Assessment/Plan   Principal Problem:    Schizophrenia ([address] )  Active Problems:    Autistic disorder    BPH (benign prostatic hyperplasia)      Recommended Treatment/PLAN:  1  Continue present medications as presently prescribed  2  Continuing to look for placement  No psychopharmacologic changes necessary at this moment; will continue to assess daily for further optimization  Continue with pharmacotherapy, group therapy, milieu therapy and occupational therapy  Continue to assess for adverse medication side effects  Encourage Sushil Russo to participate in nonverbal forms of therapy including journaling and art/music therapy  Continue frequent safety checks and vitals per unit protocol  Continue to engage CM/SW to assist with collateral, disposition planning, and the implementation of an individualized, patient-centered plan of care  Continue medical management by medical team   Case discussed with treatment team     Legal Status: [id number]  ------------------------------------------------------------    Subjective: All documentation including nursing notes, medication history to ensure medication adherence on the unit, labs, and vitals were reviewed  Lu Bush was evaluated this morning for continuity of care  I found Lu Bush in his bed again  There was an Art Therapy Group going on, so I encouraged him to get up and attend  Surprisingly, he got up and followed me into the group room and sat down  He was giggling in a peculiar way while we were walking together  Lu Bush is in no acute distress  Over the past 24 hours per nursing report, Lu Bush has been cooperative on the unit and compliant with medications  Today, Lu Bush is consenting for safety on the unit  Lu Bush reports feeling "ok " Lu Bush notes having ok sleep  Lu Bush states having a good appetite   Lu Bush has been  taking the medications as prescribed and reporting no side effects  Patient was seen and examined today in his room  We discussed my idea of him getting up and joining the group  Although he continues to complain of tinnitus, he does not appear to be respionding t internal stimuli, so I don't think these are auditory hallucinations  Kristine Akers denies suicidal ideations  Kristine Akers denies homicidal ideations  Regarding hallucinations, Kristine Akers admits to hearing noise  PRNs overnight: none   VS: Reviewed, within normal limits    Progress Toward Goals: slow improvement    Psychiatric Review of Systems:  Behavior over the last 24 hours:  improved  Sleep: slept better  Appetite: fair  Medication side effects: No   ROS: reports tinnitus -- severe    Vital signs in last 24 hours:  Temp:  [97 7 °F (36 5 °C)-98 4 °F (36 9 °C)] 97 7 °F (36 5 °C)  HR:  [51-67] 65  Resp:  [18] 18  BP: (102-126)/(58-70) 103/60    Laboratory results:  I have personally reviewed all pertinent laboratory/tests results  Recent Results (from the past 48 hour(s))   Fingerstick Glucose (POCT)    Collection Time: 01/16/23  7:45 AM   Result Value Ref Range    POC Glucose 104 65 - 140 mg/dl   Fingerstick Glucose (POCT)    Collection Time: 01/16/23  4:17 PM   Result Value Ref Range    POC Glucose 131 65 - 140 mg/dl   Valproic acid level, total    Collection Time: 01/17/23  5:50 AM   Result Value Ref Range    Valproic Acid, Total 75 50 - 100 ug/mL   Fingerstick Glucose (POCT)    Collection Time: 01/17/23  7:57 AM   Result Value Ref Range    POC Glucose 122 65 - 140 mg/dl         Mental Status Evaluation:    Appearance:  disheveled, dressed in hospital attire, looks stated age   Behavior:  pleasant, cooperative, calm, with some bizarre giggling     Speech:  normal rate and volume   Mood:  "ok"   Affect:  blunted   Thought Process:  poverty of thought, subjectively   Associations: perseverative   Thought Content:  paucity of thought, subjectively   Perceptual Disturbances: Does not appear to be responding to internal stimuli and hears noise   Risk Potential: Suicidal ideation - None  Homicidal ideation - None  Potential for aggression - No   Sensorium:  oriented to person, place and situation   Memory:  recent and remote memory: unable to assess due to lack of cooperation   Consciousness:  alert and awake   Attention/Concentration: decreased concentration and decreased attention span   Insight:  impaired   Judgment: improving   Gait/Station: normal gait/station   Motor Activity: no abnormal movements       Current Medications:  Current Facility-Administered Medications   Medication Dose Route Frequency Provider Last Rate   • acetaminophen  650 mg Oral Q4H PRN Faviola Waller MD     • acetaminophen  650 mg Oral Q4H PRN Faviola Waller MD     • acetaminophen  975 mg Oral Q6H PRN Faviola Waller MD     • benztropine  0 5 mg Oral Q4H PRN Max 6/day Faviola Waller MD     • cholecalciferol  1,000 Units Oral Daily Carolina Stallings PA-C     • divalproex sodium  500 mg Oral BID Faviola Waller MD     • docusate sodium  100 mg Oral BID Carolina Stallings PA-C     • glycerin-hypromellose-  1 drop Both Eyes BID Carolina Stallings PA-C     • hydrOXYzine HCL  25 mg Oral Q6H PRN Max 4/day Faviola Waller MD     • LORazepam  1 mg Intramuscular Q6H PRN Max 3/day Faviola Waller MD     • LORazepam  0 5 mg Oral Q6H PRN Max 4/day Faviola Waller MD     • LORazepam  1 mg Oral Q6H PRN Max 3/day Faviola Waller MD     • magnesium hydroxide  30 mL Oral Daily PRN Carolina Stallings PA-C     • melatonin  3 mg Oral HS PRN Faviola Waller MD     • OLANZapine  5 mg Intramuscular Q3H PRN Max 3/day Faviola Waller MD     • OLANZapine  2 5 mg Oral Q4H PRN Max 6/day Faviola Waller MD     • OLANZapine  5 mg Oral Q4H PRN Max 3/day Faviola Waller MD     • OLANZapine  5 mg Oral Q3H PRN Max 3/day Faviola Waller MD     • polyethylene glycol  17 g Oral Daily PRN Carolina Stallings PA-C     • risperiDONE  2 mg Oral HS Faviola Waller MD     • tamsulosin  0 4 mg Oral Daily With Ariadna Berg PA-C         Suicide/Homicide Risk Assessment:  Risk of Harm to Self:   Based on today's assessment, Ida Jade presents the following risk of harm to self: low    Risk of Harm to Others:  Based on today's assessment, Ida Jade presents the following risk of harm to others: low    The following interventions are recommended: behavioral checks every 7 minutes, continued hospitalization on locked unit    Behavioral Health Medications: All current active meds have been reviewed  Valproic acid level is therapeutic at 75  Changes as in plan section above  Risks, benefits and possible side effects of Medications:   Patient does not verbalize understanding at this time and will require further explanation  Counseling / Coordination of Care:  Patient's progress discussed with staff in treatment team meeting  Medications, treatment progress and treatment plan reviewed with patient  Meliton Galaviz MD 01/17/23      This note was completed in part utilizing [employer] Software  Grammatical, translation, syntax errors, random word insertions, spelling mistakes, and incomplete sentences may be an occasional consequence of this system secondary to software limitations with voice recognition, ambient noise, and hardware issues  If you have any questions or concerns about the content, text, or information contained within the body of this dictation, please contact the provider for clarification

## 2023-01-17 NOTE — PLAN OF CARE
Problem: SAFETY ADULT  Goal: Patient will remain free of falls  Description: INTERVENTIONS:  - Educate patient/family on patient safety including physical limitations  - Instruct patient to call for assistance with activity   - Consult OT/PT to assist with strengthening/mobility   - Keep Call bell within reach  - Keep bed low and locked with side rails adjusted as appropriate  - Keep care items and personal belongings within reach  - Initiate and maintain comfort rounds  - Make Fall Risk Sign visible to staff  - Offer Toileting every 2 Hours, in advance of need  - Initiate/Maintain bed alarm  - Obtain necessary fall risk management equipment:   - Apply yellow socks and bracelet for high fall risk patients  - Consider moving patient to room near nurses station  Outcome: Progressing     Problem: Anxiety  Goal: Anxiety is at manageable level  Description: Interventions:  - Assess and monitor patient's anxiety level  - Monitor for signs and symptoms (heart palpitations, chest pain, shortness of breath, headaches, nausea, feeling jumpy, restlessness, irritable, apprehensive)  - Collaborate with interdisciplinary team and initiate plan and interventions as ordered    - [place] patient to unit/surroundings  - Explain treatment plan  - Encourage participation in care  - Encourage verbalization of concerns/fears  - Identify coping mechanisms  - Assist in developing anxiety-reducing skills  - Administer/offer alternative therapies  - Limit or eliminate stimulants  Outcome: Progressing

## 2023-01-17 NOTE — NURSING NOTE
Patient is visible on the unit this evening, socializing w/ mahad  Patient paranoid regarding medications and the packaging, initially requesting they come from the bottle, but was med compliant  Patient denies all psych symptoms, although does laugh inappropriately at times  Requests to review MRI and lab results w/ Dr  Patient also complaining of tinnitus in ears the past two days  Will CTM  Continuous patient safety checks ongoing

## 2023-01-17 NOTE — NURSING NOTE
Patient visible in milieu, pleasant and cooperative in interaction  Social with staff and peers  Patient describes anxiety/depression as being "Good"  Denies SI/HI, hallucinations  Patient presents with paranoid ideation in regards to medication  Per patient, wants medication in a bottle "so there's proof of it"  "I want it written down", patient inspected all medication packaging prior to taking them  "What you are giving me is fraudulent"  Patient remains medication compliant and on 7" checks for safety and behaviors

## 2023-01-17 NOTE — PROGRESS NOTES
01/16/23 0930   Team Meeting   Meeting Type Daily Rounds   Team Members Present   Team Members Present Physician;Nurse;   Physician Team Member Dr Cory Dacosta MD; RK Gutierrez   Nursing Team Member Anaid [place]; Ophelia Ashraf RN   Care Management Team Member MS Dheeraj, [facility] [place]   OT Team Member Prem Roman [place]   Patient/Family Present   Patient Present No   Patient's Family Present No   Mood decreases as day goes on, medication compliant Depakote level due tomorrow, medication adjustment

## 2023-01-17 NOTE — PROGRESS NOTES
Progress Note - Behavioral Health   Reyes Cage 58 y o  male MRN: [id number]  Unit/Bed#: [facility] 203-01 Encounter: [id number]    Assessment/Plan   Principal Problem:    Schizophrenia [facility])  Active Problems:    Autistic disorder    BPH (benign prostatic hyperplasia)      Recommended Treatment:   Valproic acid level tomorrow, 1/17/2023  No psychopharmacologic changes necessary at this moment; will continue to assess daily for further optimization  Continue with pharmacotherapy, group therapy, milieu therapy and occupational therapy  Continue to assess for adverse medication side effects  Encourage Reyes Cage to participate in nonverbal forms of therapy including journaling and art/music therapy  Continue frequent safety checks and vitals per unit protocol  Continue to engage CM/SW to assist with collateral, disposition planning, and the implementation of an individualized, patient-centered plan of care  Continue medical management by medical team   Case discussed with treatment team     Legal Status: [id number]  ------------------------------------------------------------    Subjective: All documentation including nursing notes, medication history to ensure medication adherence on the unit, labs, and vitals were reviewed  Kristine Akers was evaluated today for continuity of care  I found Kristine Akers is his room, in bed  I introduced myself and the first things out of his mouth were, "I have a thought disorder and I'm autistic " Asking questions was a challenge  Kristine Akers did not have answers to most of my questions, except to tell me that he is feeling, "Not good " He did not however, appear to be in acute distress  He is very child like  There seems to be some degree of I D This could all be due to his diagnosis of autism  He did not voice somatic delusions, but I did not ask him pointed questions about this      Kristine Akers remains somewhat suspicious and paranoid, especially worrying that staff might not have his best interests at heart  He receives quite a bit of reassurance  He continues to laugh inappropriately at times  Over the past 24 hours per nursing report, Carl John has been cooperative on the unit and compliant with medications  Carl John is consenting for safety on the unit  Carl John reports feeling "not good " Carl John notes having fair sleep  He asks for trazodone when he needs it  Brandy Roachland states having a ok appetite  Carl John has been  taking the medications as prescribed and reporting no side effects  Patient was seen and examined today in his room  We did not discuss anything per se  I tried to get a feeling for him as a person and for what symptoms he is currently experiencing  Carl John denies suicidal ideations  Carl John denies homicidal ideations  Regarding hallucinations, Carl John admits to hearing "a lot of noise "    PRNs overnight: none   VS: Reviewed, within normal limits    Progress Toward Goals: slow improvement    Psychiatric Review of Systems:  Behavior over the last 24 hours:  unchanged  Sleep: improved  Appetite: fair  Medication side effects: No   ROS: no complaints    Vital signs in last 24 hours:  Temp:  [98 1 °F (36 7 °C)-98 4 °F (36 9 °C)] 98 3 °F (36 8 °C)  HR:  [51-67] 65  Resp:  [18] 18  BP: (102-126)/(58-70) 109/58    Laboratory results:  I have personally reviewed all pertinent laboratory/tests results    Recent Results (from the past 48 hour(s))   Fingerstick Glucose (POCT)    Collection Time: 01/16/23  7:45 AM   Result Value Ref Range    POC Glucose 104 65 - 140 mg/dl   Fingerstick Glucose (POCT)    Collection Time: 01/16/23  4:17 PM   Result Value Ref Range    POC Glucose 131 65 - 140 mg/dl   Valproic acid level, total    Collection Time: 01/17/23  5:50 AM   Result Value Ref Range    Valproic Acid, Total 75 50 - 100 ug/mL   Fingerstick Glucose (POCT)    Collection Time: 01/17/23  7:57 AM   Result Value Ref Range    POC Glucose 122 65 - 140 mg/dl         Mental Status Evaluation:    Appearance: disheveled, dressed in hospital attire, looks stated age   Behavior:  cooperative, odd responses   Speech:  normal rate and volume, scant, perseverative   Mood:  "not good"   Affect:  blunted   Thought Process:  perseverative   Associations: perseverative   Thought Content:  paranoid ideation, poverty of thought   Perceptual Disturbances:  Auditory hallucinations and of noise   Risk Potential: Suicidal ideation - None  Homicidal ideation - None  Potential for aggression - No   Sensorium:  oriented to person and place   Memory:  short term memory moderately impaired, long term memory moderately impaired   Consciousness:  alert and awake   Attention/Concentration: decreased concentration and decreased attention span   Insight:  poor   Judgment: impaired   Gait/Station: normal gait/station   Motor Activity: no abnormal movements       Current Medications:  Current Facility-Administered Medications   Medication Dose Route Frequency Provider Last Rate   • acetaminophen  650 mg Oral Q4H PRN Eric Dial MD     • acetaminophen  650 mg Oral Q4H PRN Eric Dial MD     • acetaminophen  975 mg Oral Q6H PRN Eric Dial MD     • benztropine  0 5 mg Oral Q4H PRN Max 6/day Eric Dial MD     • cholecalciferol  1,000 Units Oral Daily Carolina Stallings PA-C     • divalproex sodium  500 mg Oral BID Eric Dial MD     • docusate sodium  100 mg Oral BID Carolina Stallings PA-C     • glycerin-hypromellose-  1 drop Both Eyes BID Carolina Stallings PA-C     • hydrOXYzine HCL  25 mg Oral Q6H PRN Max 4/day Eric Dial MD     • LORazepam  1 mg Intramuscular Q6H PRN Max 3/day Eric Dial MD     • LORazepam  0 5 mg Oral Q6H PRN Max 4/day Eric Dial MD     • LORazepam  1 mg Oral Q6H PRN Max 3/day Eric Dial MD     • magnesium hydroxide  30 mL Oral Daily PRN Carolina Stallings PA-C     • melatonin  3 mg Oral HS PRN Eric Dial MD     • OLANZapine  5 mg Intramuscular Q3H PRN Max 3/day Eric Dial MD     • OLANZapine  2 5 mg Oral Q4H PRN Max 6/day Mitchel Goetz MD     • OLANZapine  5 mg Oral Q4H PRN Max 3/day Mitchel Goetz MD     • OLANZapine  5 mg Oral Q3H PRN Max 3/day Mitchel Goetz MD     • polyethylene glycol  17 g Oral Daily PRN Carolina Stallings PA-C     • risperiDONE  2 mg Oral HS Mitchel Goetz MD     • tamsulosin  0 4 mg Oral Daily With Dinner Carolina Stallings PA-C         Suicide/Homicide Risk Assessment:  Risk of Harm to Self:   Based on today's assessment, Benito Caldwell presents the following risk of harm to self: low    Risk of Harm to Others:  Based on today's assessment, Beniot Caldwell presents the following risk of harm to others: low    The following interventions are recommended: behavioral checks every 7 minutes, continued hospitalization on locked unit    Behavioral Health Medications: All current active meds have been reviewed  Risks, benefits and possible side effects of Medications:   Patient does not verbalize understanding at this time and will require further explanation  Counseling / Coordination of Care:  Patient's progress discussed with staff in treatment team meeting  Medications, treatment progress and treatment plan reviewed with patient  Luis Daniel Steele MD 01/17/23      This note was completed in part utilizing [employer] Software  Grammatical, translation, syntax errors, random word insertions, spelling mistakes, and incomplete sentences may be an occasional consequence of this system secondary to software limitations with voice recognition, ambient noise, and hardware issues  If you have any questions or concerns about the content, text, or information contained within the body of this dictation, please contact the provider for clarification

## 2023-01-17 NOTE — SOCIAL WORK
CM met with PT for PT check in  PT was minimal but pleasant in conversation  Denies SI/HI/AH/VH anxiety and depression  PT declined needing anything at this time reassurance provided

## 2023-01-17 NOTE — PROGRESS NOTES
01/17/23 0994   Team Meeting   Meeting Type Daily Rounds   Team Members Present   Team Members Present Physician;Nurse;;Occupational Therapist   Physician Team Member Dr Heri Oakley MD; RK Phipps   Nursing Team Member Ruddy Shore RN   Care Management Team Member MS Dheeraj, [facility]   OT Team Member Mague Valdovinos [place]   Patient/Family Present   Patient Present No   Patient's Family Present No   Paranoid, bizarre, anxiety and depression PT reports as ok, medication adjustment  Reviewed Depakote level  No d/c date at thsi time, will return home and continue with [facility] ACT services

## 2023-01-18 LAB
GLUCOSE SERPL-MCNC: 127 MG/DL (ref 65–140)
GLUCOSE SERPL-MCNC: 148 MG/DL (ref 65–140)

## 2023-01-18 RX ADMIN — ACETAMINOPHEN 325 MG: 325 TABLET ORAL at 16:04

## 2023-01-18 RX ADMIN — DIVALPROEX SODIUM 500 MG: 500 TABLET, EXTENDED RELEASE ORAL at 21:12

## 2023-01-18 RX ADMIN — RISPERIDONE 2 MG: 2 TABLET ORAL at 21:12

## 2023-01-18 RX ADMIN — POLYETHYLENE GLYCOL 3350 17 G: 17 POWDER, FOR SOLUTION ORAL at 17:06

## 2023-01-18 RX ADMIN — GLYCERIN, HYPROMELLOSE, POLYETHYLENE GLYCOL 1 DROP: .2; .2; 1 LIQUID OPHTHALMIC at 08:09

## 2023-01-18 RX ADMIN — OLANZAPINE 2.5 MG: 2.5 TABLET, FILM COATED ORAL at 16:05

## 2023-01-18 RX ADMIN — TAMSULOSIN HYDROCHLORIDE 0.4 MG: 0.4 CAPSULE ORAL at 17:07

## 2023-01-18 RX ADMIN — DIVALPROEX SODIUM 500 MG: 500 TABLET, EXTENDED RELEASE ORAL at 08:08

## 2023-01-18 RX ADMIN — Medication 1000 UNITS: at 08:08

## 2023-01-18 RX ADMIN — DOCUSATE SODIUM 100 MG: 100 CAPSULE, LIQUID FILLED ORAL at 08:08

## 2023-01-18 RX ADMIN — GLYCERIN, HYPROMELLOSE, POLYETHYLENE GLYCOL 1 DROP: .2; .2; 1 LIQUID OPHTHALMIC at 17:08

## 2023-01-18 RX ADMIN — DOCUSATE SODIUM 100 MG: 100 CAPSULE, LIQUID FILLED ORAL at 17:06

## 2023-01-18 NOTE — NURSING NOTE
Patient visible on the unit this evening  Patient denied all psych symptoms  Patient stated he thought we were giving him fentanyl, was able to reassure the pt that was not the case  Pt initially stated he did not see medical and psych providers but upon questioning admitted he did meet with them but is still requesting to talk to them tomorrow  Patient remains suspicious of med needing to watch them being taken out of the package and seeing the names on them  Focused on d/c repeatedly asking this writer and other staff when he could go home  Will CTM  Continuous patient safety  Checks ongoing

## 2023-01-18 NOTE — PROGRESS NOTES
01/18/23 0730   Activity/Group Checklist   Group Community meeting   Attendance Attended   Attendance Duration (min) 46-60   Interactions Interacted appropriately   Affect/Mood Appropriate   Goals Achieved Identified feelings; Able to listen to others; Able to engage in interactions; Able to self-disclose; Able to recieve feedback

## 2023-01-18 NOTE — SOCIAL WORK
KAYLEN received voicemail from [employer] with [facility] indicating that he would be here on Thursday at 1100am to visit with PT  CM returned call and left message confirming this would be ok and that we look forward to his visit     [phone]

## 2023-01-18 NOTE — NURSING NOTE
Patient visible in milieu, pleasant and cooperative in interaction  Social with staff and peers  Patient describes anxiety and depression as being "Pretty good", denies SI/HI, hallucinations  Remains medication compliant and on 7" checks for safety and behaviors

## 2023-01-18 NOTE — PROGRESS NOTES
01/18/23 0853   Team Meeting   Meeting Type Daily Rounds   Team Members Present   Team Members Present Physician;Nurse;;Occupational Therapist   Physician Team Member Dr Li Awan MD; RK Lou   Nursing Team Member Any Dhillon, FAREED; Geremias Morrell RN   Care Management Team Member Ross Oliver    OT Team Member Sandy Patiño [place]   Patient/Family Present   Patient Present No   Patient's Family Present No   Easily redirected, paranoid, delusional, inappropriate laughter, repetitive, no d/c date at this time, will return home and continue with [facility] act services

## 2023-01-18 NOTE — PROGRESS NOTES
Progress Note - Cabrera Rodgers 58 y o  male MRN: [id number]    Unit/Bed#: [facility] 203-01 Encounter: [id number]        Subjective:   Patient seen and examined at bedside after reviewing the chart and discussing the case with the caring staff  Patient examined at bedside  Patient reports ringing in bilateral ears for the past few days  States this is intermittent and lasts a few hours  Currently, he is asymptomatic  Patient states he had this in the past which resolved on its own  He also states he saw ENT few years ago for tinnitus but does not recall if anything was done  Denies any ear pain, itching, drainage, headache, dizziness, lightheadedness  Physical Exam   Vitals: Blood pressure 98/63, pulse 60, temperature (!) 97 4 °F (36 3 °C), temperature source Temporal, resp  rate 16, height 5' 11" (1 803 m), weight 68 7 kg (151 lb 6 4 oz), SpO2 98 %  ,Body mass index is 21 12 kg/m²  Constitutional: Patient in no acute distress  HEENT: PERR, EOMI, MMM  B/l external ears normal, auditory canals clear, TMs normal   Cardiovascular: Normal rate and regular rhythm  Pulmonary/Chest: Effort normal and breath sounds normal    Abdomen: Soft, + BS, NT  Assessment/Plan:    Cabrera Rodgers is a(n) 58 y o  male with schizophrenia      1  Type 2 diabetes mellitus  Diet controlled  Patient not on diabetic medication  Hgb a1c 6 1% on 1/2/2023  Carb controlled diet  Accu-Cheks twice daily  2  BPH  Continue Flomax 0 4 mg daily  3  Chronic bronchitis  Stable  Patient may use albuterol inhaler as needed  4  DJD/OA  Patient may take Tylenol as needed  5  Constipation  Patient started on Colace 100 mg twice daily, Miralax/MOM as needed  6  Vitamin D deficiency  Patient started on vitamin D3 1000 units daily  7  Tinnitus  Recommend f/u outpatient ENT  The patient was discussed with Dr Marleny Mosley and he is in agreement with the above note

## 2023-01-18 NOTE — PROGRESS NOTES
01/17/23 1330   Activity/Group Checklist   Group   (Creative Expression Art Therapy Processing)   Attendance Attended   Attendance Duration (min) Greater than 60   Interactions Interacted appropriately   Affect/Mood Appropriate   Goals Achieved Identified feelings; Discussed coping strategies; Identified resources and support systems; Able to listen to others; Able to engage in interactions; Able to reflect/comment on own behavior;Able to manage/cope with feelings

## 2023-01-18 NOTE — NURSING NOTE
Patient somatically preoccupied, requesting medication such as Metamucil and Tylenol for testicular pain  PRN Miralax given  Patient stated he had BM yesterday  Patient then approached nurse in hallway demanding "Proof of illness" for his Schizophrenia  Diagnosis list shown to patient  During dinner patient wanted "labs" due to his "Veins popping out"  Patient redirected to talk with Physician in morning

## 2023-01-18 NOTE — PLAN OF CARE
Problem: Alteration in Thoughts and Perception  Goal: Verbalize thoughts and feelings  Description: Interventions:  - Promote a nonjudgmental and trusting relationship with the patient through active listening and therapeutic communication  - Assess patient's level of functioning, behavior and potential for risk  - Engage patient in 1 on 1 interactions  - Encourage patient to express fears, feelings, frustrations, and discuss symptoms    - [place] patient to reality, help patient recognize reality-based thinking   - Administer medications as ordered and assess for potential side effects  - Provide the patient education related to the signs and symptoms of the illness and desired effects of prescribed medications  Outcome: Progressing     Problem: Anxiety  Goal: Anxiety is at manageable level  Description: Interventions:  - Assess and monitor patient's anxiety level  - Monitor for signs and symptoms (heart palpitations, chest pain, shortness of breath, headaches, nausea, feeling jumpy, restlessness, irritable, apprehensive)  - Collaborate with interdisciplinary team and initiate plan and interventions as ordered    - [place] patient to unit/surroundings  - Explain treatment plan  - Encourage participation in care  - Encourage verbalization of concerns/fears  - Identify coping mechanisms  - Assist in developing anxiety-reducing skills  - Administer/offer alternative therapies  - Limit or eliminate stimulants  Outcome: Progressing

## 2023-01-18 NOTE — SOCIAL WORK
CM met with PT or PT check in  PT was pleasant and engaged in conversation, less repetitive, PT indicated that he spoke with his act team today on the phone and it went well  CM reviewed with PT that his act team will be out to see him tomorrow in person, PT in agreement and reported that he is looking forward to  PT denies si/hi/ah/vh anxiety and depression

## 2023-01-19 LAB
GLUCOSE SERPL-MCNC: 126 MG/DL (ref 65–140)
GLUCOSE SERPL-MCNC: 175 MG/DL (ref 65–140)

## 2023-01-19 RX ADMIN — GLYCERIN, HYPROMELLOSE, POLYETHYLENE GLYCOL 1 DROP: .2; .2; 1 LIQUID OPHTHALMIC at 08:39

## 2023-01-19 RX ADMIN — DOCUSATE SODIUM 100 MG: 100 CAPSULE, LIQUID FILLED ORAL at 08:39

## 2023-01-19 RX ADMIN — TAMSULOSIN HYDROCHLORIDE 0.4 MG: 0.4 CAPSULE ORAL at 17:10

## 2023-01-19 RX ADMIN — RISPERIDONE 2 MG: 2 TABLET ORAL at 21:23

## 2023-01-19 RX ADMIN — DIVALPROEX SODIUM 500 MG: 500 TABLET, EXTENDED RELEASE ORAL at 08:39

## 2023-01-19 RX ADMIN — GLYCERIN, HYPROMELLOSE, POLYETHYLENE GLYCOL 1 DROP: .2; .2; 1 LIQUID OPHTHALMIC at 17:10

## 2023-01-19 RX ADMIN — DOCUSATE SODIUM 100 MG: 100 CAPSULE, LIQUID FILLED ORAL at 17:10

## 2023-01-19 RX ADMIN — DIVALPROEX SODIUM 500 MG: 500 TABLET, EXTENDED RELEASE ORAL at 21:23

## 2023-01-19 RX ADMIN — Medication 1000 UNITS: at 08:39

## 2023-01-19 RX ADMIN — POLYETHYLENE GLYCOL 3350 17 G: 17 POWDER, FOR SOLUTION ORAL at 17:39

## 2023-01-19 NOTE — PROGRESS NOTES
01/19/23 0967   Team Meeting   Meeting Type Daily Rounds   Team Members Present   Team Members Present Physician;Nurse;;Occupational Therapist   Physician Team Member Dr Ericka Juarez MD; Albin Andrew, [address] [place] [address]   Nursing Team Member Everardo Yung RN   Care Management Team Member MS Dheeraj, [facility], [facility]   [place] Team Member [place], [place]   Patient/Family Present   Patient Present No   Patient's Family Present No   Mood lability, paranoid, slept, denies all, showered, delusional, [facility] coming to visit in person with pt today

## 2023-01-19 NOTE — PLAN OF CARE
Problem: Nutrition/Hydration-ADULT  Goal: Nutrient/Hydration intake appropriate for improving, restoring or maintaining nutritional needs  Description: Monitor and assess patient's nutrition/hydration status for malnutrition  Collaborate with interdisciplinary team and initiate plan and interventions as ordered  Monitor patient's weight and dietary intake as ordered or per policy  Utilize nutrition screening tool and intervene as necessary  Determine patient's food preferences and provide high-protein, high-caloric foods as appropriate       INTERVENTIONS:  - Monitor oral intake, urinary output, labs, and treatment plans  - Assess nutrition and hydration status and recommend course of action  - Evaluate amount of meals eaten  - Assist patient with eating if necessary   - Allow adequate time for meals  - Recommend/ encourage appropriate diets, oral nutritional supplements, and vitamin/mineral supplements  - Order, calculate, and assess calorie counts as needed  - Recommend, monitor, and adjust tube feedings and TPN/PPN based on assessed needs  - Assess need for intravenous fluids  - Provide specific nutrition/hydration education as appropriate  - Include patient/family/caregiver in decisions related to nutrition  Outcome: Progressing     Problem: SAFETY ADULT  Goal: Patient will remain free of falls  Description: INTERVENTIONS:  - Educate patient/family on patient safety including physical limitations  - Instruct patient to call for assistance with activity   - Consult OT/PT to assist with strengthening/mobility   - Keep Call bell within reach  - Keep bed low and locked with side rails adjusted as appropriate  - Keep care items and personal belongings within reach  - Initiate and maintain comfort rounds  - Make Fall Risk Sign visible to staff  - Apply yellow socks and bracelet for high fall risk patients  - Consider moving patient to room near nurses station  Outcome: Progressing     Problem: Alteration in Thoughts and Perception  Goal: Verbalize thoughts and feelings  Description: Interventions:  - Promote a nonjudgmental and trusting relationship with the patient through active listening and therapeutic communication  - Assess patient's level of functioning, behavior and potential for risk  - Engage patient in 1 on 1 interactions  - Encourage patient to express fears, feelings, frustrations, and discuss symptoms    - [place] patient to reality, help patient recognize reality-based thinking   - Administer medications as ordered and assess for potential side effects  - Provide the patient education related to the signs and symptoms of the illness and desired effects of prescribed medications  Outcome: Progressing  Goal: Refrain from acting on delusional thinking/internal stimuli  Description: Interventions:  - Monitor patient closely, per order   - Utilize least restrictive measures   - Set reasonable limits, give positive feedback for acceptable   - Administer medications as ordered and monitor of potential side effects  Outcome: Progressing  Goal: Agree to be compliant with medication regime, as prescribed and report medication side effects  Description: Interventions:  - Offer appropriate PRN medication and supervise ingestion; conduct AIMS, as needed   Outcome: Progressing  Goal: Attend and participate in unit activities, including therapeutic, recreational, and educational groups  Description: Interventions:  -Encourage Visitation and family involvement in care  Outcome: Progressing  Goal: Complete daily ADLs, including personal hygiene independently, as able  Description: Interventions:  - Observe, teach, and assist patient with ADLS  - Monitor and promote a balance of rest/activity, with adequate nutrition and elimination   Outcome: Progressing     Problem: Anxiety  Goal: Anxiety is at manageable level  Description: Interventions:  - Assess and monitor patient's anxiety level     - Monitor for signs and symptoms (heart palpitations, chest pain, shortness of breath, headaches, nausea, feeling jumpy, restlessness, irritable, apprehensive)  - Collaborate with interdisciplinary team and initiate plan and interventions as ordered    - [place] patient to unit/surroundings  - Explain treatment plan  - Encourage participation in care  - Encourage verbalization of concerns/fears  - Identify coping mechanisms  - Assist in developing anxiety-reducing skills  - Administer/offer alternative therapies  - Limit or eliminate stimulants  Outcome: Progressing     Problem: Ineffective Coping  Goal: Participates in unit activities  Description: Interventions:  - Provide therapeutic environment   - Provide required programming   - Redirect inappropriate behaviors   Outcome: Progressing

## 2023-01-19 NOTE — PROGRESS NOTES
Progress Note - Gavino Nielsen 58 y o  male MRN: [id number]    Unit/Bed#: Ty Clem[facility] 203-01 Encounter: [id number]        Subjective:   Patient seen and examined at bedside after reviewing the chart and discussing the case with the caring staff  Patient examined at bedside  Staff reports yesterday patient had complaints of pain in his right groin and testicular pain  Today, he states he feels fine and denies any pain or ear ringing  Denies any trouble urinating, dysuria, frequency, abdominal pain, constipation, diarrhea  Last BM yesterday which he reports was normal   Told pt to let staff know if symptoms return  Physical Exam   Vitals: Blood pressure 123/66, pulse (!) 54, temperature 98 3 °F (36 8 °C), temperature source Temporal, resp  rate 18, height 5' 11" (1 803 m), weight 68 7 kg (151 lb 6 4 oz), SpO2 96 %  ,Body mass index is 21 12 kg/m²  Constitutional: Patient in no acute distress  HEENT: PERR, EOMI, MMM  Cardiovascular: Normal rate and regular rhythm  Pulmonary/Chest: Effort normal and breath sounds normal    Abdomen: Soft, + BS, NT, no rebound, no guarding  Assessment/Plan:    Gavino Nielsen is a(n) 58 y o  male with schizophrenia      1  Type 2 diabetes mellitus  Diet controlled  Patient not on diabetic medication  Hgb a1c 6 1% on 1/2/2023  Carb controlled diet  Accu-Cheks twice daily  2  BPH  Continue Flomax 0 4 mg daily  3  Chronic bronchitis  Stable  Patient may use albuterol inhaler as needed  4  DJD/OA  Patient may take Tylenol as needed  5  Constipation  Patient started on Colace 100 mg twice daily, Miralax/MOM as needed  6  Vitamin D deficiency  Patient started on vitamin D3 1000 units daily  7  Tinnitus  Recommend f/u outpatient ENT  The patient was discussed with Dr Jason Carranza and he is in agreement with the above note

## 2023-01-19 NOTE — NURSING NOTE
Patient visible in the dayroom during the morning hours, and napped in his room in the afternoon  Medication and meal compliant  No behaviors  Speech is loud and pressured, and patient was observed laughing inappropriately after conversation  Response time is delayed  Denies SI/HI/AVH, depression and anxiety  Patient met with [facility] today  Morning blood sugar 128  Continuous visual safety checks performed throughout the shift  All safety precautions maintained

## 2023-01-19 NOTE — NURSING NOTE
Patient was observed to be visible in the community this evening; he was observed to be pacing the unit hallways  He also showered tonight  Speech is loud and pressured  Denies feeling anxious and/ or depressed  Denies SI, HI and hallucinations  Patient was medication compliant at ; paranoia present, asking if the medication is for euthanasia  Educated provided  Continuous safety rounding in progress

## 2023-01-19 NOTE — PROGRESS NOTES
Progress Note - Behavioral Health   Gavino Nielsen 58 y o  male MRN: [id number]  Unit/Bed#: [facility] 203-01 Encounter: [id number]    Assessment/Plan   Principal Problem:    Schizophrenia ([address] )  Active Problems:    Autistic disorder    BPH (benign prostatic hyperplasia)      Recommended Treatment/PLAN:  CM continues to look for appropriate placement  No psychopharmacologic changes necessary at this moment; will continue to assess daily for further optimization  Continue with pharmacotherapy, group therapy, milieu therapy and occupational therapy  Continue to assess for adverse medication side effects  Encourage Gavino Nielsen to participate in nonverbal forms of therapy including journaling and art/music therapy  Continue frequent safety checks and vitals per unit protocol  Continue to engage CM/SW to assist with collateral, disposition planning, and the implementation of an individualized, patient-centered plan of care  Continue medical management by medical team   Case discussed with treatment team     Legal Status: 201  ------------------------------------------------------------    Subjective: All documentation including nursing notes, medication history to ensure medication adherence on the unit, labs, and vitals were reviewed  Sanjuana Batista was evaluated today for continuity of care  Sanjuana Batista did not spend the day in bed today  He was much more visible on the unit  He approached me and asked me my name  He complained of ain in his groin where there is a scar from a procedure he had a long time ago --either appendectomy or hernia repair  I spoke with the Geriatric Advanced Practitioner  She reports that Sanjuana Batista makes complaints  Almost every day   He seems to have forgotten about the tinnitus and in its place is the groin pain, which seems to be vague and difficult for him to describe except to say, "it hurts " Over the past 24 hours per nursing report, Sanjuana Batista has been cooperative on the unit and compliant with medications  Today, Ingrid Rios is consenting for safety on the unit  Ingrid Rios reports feeling "ok " Ingrid Rios notes having  Sleep, but has a difficult time admitting that his sleep was good  Ingrid Rios states having a good appetite  Ingrid Rios has been  taking the medications as prescribed and reporting no side effects  Patient was seen and examined today  We discussed his current somatic complaint  Ingrid Rios denies suicidal ideations  Ingrid Rios denies homicidal ideations  Regarding hallucinations, Ingrid Rios denies auditory or visual hallucinations  PRNs overnight: Tylenol  VS: Reviewed, within normal limits    Progress Toward Goals: slow improvement    Psychiatric Review of Systems:  Behavior over the last 24 hours:  improved  Sleep: normal  Appetite: normal  Medication side effects: No   ROS: reports groin pain    Vital signs in last 24 hours:  Temp:  [97 4 °F (36 3 °C)-98 5 °F (36 9 °C)] 98 5 °F (36 9 °C)  HR:  [55-72] 72  Resp:  [16-18] 18  BP: ()/(61-70) 137/70    Laboratory results:  I have personally reviewed all pertinent laboratory/tests results    Recent Results (from the past 48 hour(s))   Valproic acid level, total    Collection Time: 01/17/23  5:50 AM   Result Value Ref Range    Valproic Acid, Total 75 50 - 100 ug/mL   Fingerstick Glucose (POCT)    Collection Time: 01/17/23  7:57 AM   Result Value Ref Range    POC Glucose 122 65 - 140 mg/dl   Fingerstick Glucose (POCT)    Collection Time: 01/17/23  4:10 PM   Result Value Ref Range    POC Glucose 134 65 - 140 mg/dl   Fingerstick Glucose (POCT)    Collection Time: 01/18/23  7:33 AM   Result Value Ref Range    POC Glucose 127 65 - 140 mg/dl   Fingerstick Glucose (POCT)    Collection Time: 01/18/23  4:24 PM   Result Value Ref Range    POC Glucose 148 (H) 65 - 140 mg/dl         Mental Status Evaluation:    Appearance:  disheveled, and wearing paper scrubs   Behavior:  cooperative, for the most part but became loud later in the day, insisting to be told what the Geriatric AP prescribed     Speech:  increased volume, when he was arguing with the nurse   Mood:  "ok"   Affect:  somewhat labile   Thought Process:  perseverative   Associations: intact associations   Thought Content:  preoccupied with various somatic complaints   Perceptual Disturbances: Does not appear to be responding to internal stimuli   Risk Potential: Suicidal ideation - None at present  Homicidal ideation - None  Potential for aggression - No   Sensorium:  oriented to person, place and situation   Memory:  recent and remote memory grossly intact   Consciousness:  alert and awake   Attention/Concentration: attention span and concentration are age appropriate   Insight:  impaired   Judgment: impaired   Gait/Station: normal gait/station   Motor Activity: no abnormal movements       Current Medications:  Current Facility-Administered Medications   Medication Dose Route Frequency Provider Last Rate   • acetaminophen  650 mg Oral Q4H PRN Citlalli Tabor MD     • acetaminophen  650 mg Oral Q4H PRN Citlalli Tabor MD     • acetaminophen  975 mg Oral Q6H PRN Citlalli Tabor MD     • benztropine  0 5 mg Oral Q4H PRN Max 6/day Citlalli Tabor MD     • cholecalciferol  1,000 Units Oral Daily Carolina Stallings PA-C     • divalproex sodium  500 mg Oral BID Citlalli Tabor MD     • docusate sodium  100 mg Oral BID Carolina Stallings PA-C     • glycerin-hypromellose-  1 drop Both Eyes BID Carolina Stallings PA-C     • hydrOXYzine HCL  25 mg Oral Q6H PRN Max 4/day Citlalli Tabor MD     • LORazepam  1 mg Intramuscular Q6H PRN Max 3/day Citlalli Tabor MD     • LORazepam  0 5 mg Oral Q6H PRN Max 4/day Citlalli Tabor MD     • LORazepam  1 mg Oral Q6H PRN Max 3/day Citlalli Tabor MD     • magnesium hydroxide  30 mL Oral Daily PRN Carolina Stallings PA-C     • melatonin  3 mg Oral HS PRN Citlalli Tabor MD     • OLANZapine  5 mg Intramuscular Q3H PRN Max 3/day Citlalli Tabor MD     • OLANZapine  2 5 mg Oral Q4H PRN Max 6/day Citlalli Tabor MD • OLANZapine  5 mg Oral Q4H PRN Max 3/day Chas Woodson MD     • OLANZapine  5 mg Oral Q3H PRN Max 3/day hCas Woodson MD     • polyethylene glycol  17 g Oral Daily PRN Carolina Stallings PA-C     • risperiDONE  2 mg Oral HS Chas Woodson MD     • tamsulosin  0 4 mg Oral Daily With Dinner Carolina Stallings PA-C         Suicide/Homicide Risk Assessment:  Risk of Harm to Self:   Based on today's assessment, Kennedy Floyd presents the following risk of harm to self: low    Risk of Harm to Others:  Based on today's assessment, Kennedy Floyd presents the following risk of harm to others: low    The following interventions are recommended: behavioral checks every 7 minutes, continued hospitalization on locked unit    Behavioral Health Medications: All current active meds have been reviewed  Risks, benefits and possible side effects of Medications:   Risks, benefits, and possible side effects of medications explained to patient and patient verbalizes understanding  Counseling / Coordination of Care:  Patient's progress discussed with staff in treatment team meeting  Medications, treatment progress and treatment plan reviewed with patient  David Damon MD 01/18/23      This note was completed in part utilizing M-Modal Fluency Direct Software  Grammatical, translation, syntax errors, random word insertions, spelling mistakes, and incomplete sentences may be an occasional consequence of this system secondary to software limitations with voice recognition, ambient noise, and hardware issues  If you have any questions or concerns about the content, text, or information contained within the body of this dictation, please contact the provider for clarification

## 2023-01-19 NOTE — PROGRESS NOTES
01/18/23 1300   Activity/Group Checklist   Group   ([facility] Art Therapy)   Attendance Attended   Attendance Duration (min) Greater than 60   Interactions Disorganized interaction   Affect/Mood Appropriate   Goals Achieved Identified feelings; Discussed discharge plans; Able to listen to others; Able to engage in interactions; Able to reflect/comment on own behavior;Able to manage/cope with feelings

## 2023-01-19 NOTE — PROGRESS NOTES
01/19/23 7266   Activity/Group Checklist   Group Community meeting   Attendance Did not attend  (Pt offered grp; pt remained in bed resting)

## 2023-01-19 NOTE — NURSING NOTE
[facility] Team Emily Fish [phone] met with patient today to discuss treatment plan and assess patient for the team  The ACT team would like to have him stay at least another week due to his non compliance with oral medication  Per maricel Rodriguez 234 was given on 1/9/23  He does live independently in a home near his brother  The brother owns the property and patient rents from him  This has been for a duration of about 3 months  Patient has a drivers license and goes to his doctors appointments independently

## 2023-01-20 LAB
GLUCOSE SERPL-MCNC: 121 MG/DL (ref 65–140)
GLUCOSE SERPL-MCNC: 174 MG/DL (ref 65–140)

## 2023-01-20 RX ADMIN — Medication 1000 UNITS: at 08:42

## 2023-01-20 RX ADMIN — RISPERIDONE 2 MG: 2 TABLET ORAL at 21:25

## 2023-01-20 RX ADMIN — GLYCERIN, HYPROMELLOSE, POLYETHYLENE GLYCOL 1 DROP: .2; .2; 1 LIQUID OPHTHALMIC at 08:42

## 2023-01-20 RX ADMIN — TAMSULOSIN HYDROCHLORIDE 0.4 MG: 0.4 CAPSULE ORAL at 17:14

## 2023-01-20 RX ADMIN — DOCUSATE SODIUM 100 MG: 100 CAPSULE, LIQUID FILLED ORAL at 08:42

## 2023-01-20 RX ADMIN — GLYCERIN, HYPROMELLOSE, POLYETHYLENE GLYCOL 1 DROP: .2; .2; 1 LIQUID OPHTHALMIC at 17:14

## 2023-01-20 RX ADMIN — DOCUSATE SODIUM 100 MG: 100 CAPSULE, LIQUID FILLED ORAL at 17:14

## 2023-01-20 RX ADMIN — DIVALPROEX SODIUM 500 MG: 500 TABLET, EXTENDED RELEASE ORAL at 08:42

## 2023-01-20 RX ADMIN — DIVALPROEX SODIUM 500 MG: 500 TABLET, EXTENDED RELEASE ORAL at 21:25

## 2023-01-20 NOTE — PROGRESS NOTES
01/20/23 0930   Team Meeting   Meeting Type Daily Rounds   Team Members Present   Team Members Present Physician;Nurse;;Occupational Therapist   Physician Team Member La Thacker MD; RK Lou   Nursing Team Member Geremias Morrell RN; French Garza RN   Care Management Team Member Kelvin Taylor MS, [facility], [facility]   OT Team Member Sandy Patiño [place]   Patient/Family Present   Patient Present No   Patient's Family Present No   [facility] visited yesterday and reported they feel PT needs additional week due to need for compliance with medications; slept, pleasant, AH continue, medication adjustment, Depakote level due on Monday

## 2023-01-20 NOTE — PROGRESS NOTES
Patient was to get an ultrasound of her hip at 4 weeks the hospital isn't able to get patient in till the middle of December. Mom wanted to get see if it would be possible to switch the hospital to  in [place] or [place]? This was to determine if patient might need a brace so mom didn't want to delay it too much.    Progress Note - Aniceto Taylor 58 y o  male MRN: [id number]    Unit/Bed#: [facility] 203-01 Encounter: [id number]        Subjective:   Patient seen and examined at bedside after reviewing the chart and discussing the case with the caring staff  Patient examined at bedside  Patient has no acute complaints today  Physical Exam   Vitals: Blood pressure 104/55, pulse 55, temperature 97 6 °F (36 4 °C), temperature source Temporal, resp  rate 16, height 5' 11" (1 803 m), weight 68 7 kg (151 lb 6 4 oz), SpO2 94 %  ,Body mass index is 21 12 kg/m²  Constitutional: Patient in no acute distress  HEENT: PERR, EOMI, MMM  Cardiovascular: Normal rate and regular rhythm  Pulmonary/Chest: Effort normal and breath sounds normal    Abdomen: Soft, + BS, NT, no rebound, no guarding  Assessment/Plan:    Aniceto Taylor is a(n) 58 y o  male with schizophrenia      1  Type 2 diabetes mellitus  Diet controlled  Patient not on diabetic medication  Hgb a1c 6 1% on 1/2/2023  Carb controlled diet  Accu-Cheks twice daily  2  BPH  Continue Flomax 0 4 mg daily  3  Chronic bronchitis  Stable  Patient may use albuterol inhaler as needed  4  DJD/OA  Patient may take Tylenol as needed  5  Constipation  Patient started on Colace 100 mg twice daily, Miralax/MOM as needed  6  Vitamin D deficiency  Patient started on vitamin D3 1000 units daily  7  Tinnitus  Recommend f/u outpatient ENT  The patient was discussed with Dr Loa Lanes and he is in agreement with the above note

## 2023-01-20 NOTE — NURSING NOTE
At time of assessment, patient was observed to be napping in bed  He is easily arousible  Speech is loud and pressured; laughs inappropriately at times  Informs he wants to go home  Denies any pain  Denies SI, HI and hallucinations  Denies feeling anxious and/ or depressed  Patient was medication compliant at HS; needed reassurance during medication administration  Continuous safety rounding in progress

## 2023-01-20 NOTE — PROGRESS NOTES
Progress Note - Behavioral Health   Gabrielle New 58 y o  male MRN: [id number]  Unit/Bed#: [facility] 203-01 Encounter: [id number]    Assessment/Plan   Principal Problem:    Schizophrenia ([address] )  Active Problems:    Autistic disorder    BPH (benign prostatic hyperplasia)      Recommended Treatment/PLAN:  Continue present medication regimen  No psychopharmacologic changes necessary at this moment; will continue to assess daily for further optimization  Continue with pharmacotherapy, group therapy, milieu therapy and occupational therapy  Continue to assess for adverse medication side effects  Encourage Gabrielle New to participate in nonverbal forms of therapy including journaling and art/music therapy  Continue frequent safety checks and vitals per unit protocol  Continue to engage CM/SW to assist with collateral, disposition planning, and the implementation of an individualized, patient-centered plan of care  Continue medical management by medical team   Case discussed with treatment team     Legal Status: 201  ------------------------------------------------------------    Subjective: All documentation including nursing notes, medication history to ensure medication adherence on the unit, labs, and vitals were reviewed  Miguelito Mclaughlin was evaluated this morning for continuity of care  He was less visible on the unit than he was yesterday  I found him in his room  His mood is stable and he is in no distress  Over the past 24 hours per nursing report, Miguelito Mclaughlin has been cooperative on the unit and compliant with medications  Today, Miguelito Mclaughlin is consenting for safety on the unit  Miguelito Mclaughlin reports feeling "ok " Miguelitosravan Souzan notes having "pretty good" sleep  Miguelitosravan Mclaughlin states having a good appetite  Miguelito Mclaughlin has been taking the medications as prescribed and reporting no side effects  Patient was seen and examined today in his room  We discussed how he is feeling and he asked me again, when he will be leaving   I let him know that he will go as soon as we find him a nice place to live  Diana Driscoll denies suicidal ideations  Diana Driscoll denies homicidal ideations  Regarding hallucinations, Diana Driscoll denies hearing or seeing things that others cannot see or hear  PRNs overnight: Tylenol   VS: Reviewed, within normal limits    Progress Toward Goals:  Very slow improvement    Psychiatric Review of Systems:  Behavior over the last 24 hours:  improving  Sleep: improved  Appetite: normal  Medication side effects: No   ROS: no complaints    Vital signs in last 24 hours:  Temp:  [98 1 °F (36 7 °C)-98 3 °F (36 8 °C)] 98 1 °F (36 7 °C)  HR:  [54-77] 77  Resp:  [18] 18  BP: (100-123)/(54-66) 100/54    Laboratory results:  I have personally reviewed all pertinent laboratory/tests results    Recent Results (from the past 48 hour(s))   Fingerstick Glucose (POCT)    Collection Time: 01/18/23  7:33 AM   Result Value Ref Range    POC Glucose 127 65 - 140 mg/dl   Fingerstick Glucose (POCT)    Collection Time: 01/18/23  4:24 PM   Result Value Ref Range    POC Glucose 148 (H) 65 - 140 mg/dl   Fingerstick Glucose (POCT)    Collection Time: 01/19/23  7:46 AM   Result Value Ref Range    POC Glucose 126 65 - 140 mg/dl   Fingerstick Glucose (POCT)    Collection Time: 01/19/23  4:11 PM   Result Value Ref Range    POC Glucose 175 (H) 65 - 140 mg/dl         Mental Status Evaluation:    Appearance:  disheveled, dressed in hospital attire, looks younger than stated age   Behavior:  cooperative, calm   Speech:  normal rate and volume   Mood:  "ok"   Affect:  normal range and intensity   Thought Process:  organized, perseverative   Associations: intact associations   Thought Content:  some paranoia, esecially regardingmedications, which he continues to intermitently believe are not what we say they are   Perceptual Disturbances: Denies auditory or visual hallucinations   Risk Potential: Suicidal ideation - None at present  Homicidal ideation - None at present  Potential for aggression - No   Sensorium:  oriented to person, place and situation   Memory:  difficult to assess   Consciousness:  alert and awake   Attention/Concentration: decreased concentration and decreased attention span   Insight:  limited   Judgment: impaired   Gait/Station: normal gait/station   Motor Activity: no abnormal movements       Current Medications:  Current Facility-Administered Medications   Medication Dose Route Frequency Provider Last Rate   • acetaminophen  650 mg Oral Q4H PRN Stacey Ramírez MD     • acetaminophen  650 mg Oral Q4H PRN Stacey Ramírez MD     • acetaminophen  975 mg Oral Q6H PRN Stacey Ramírez MD     • benztropine  0 5 mg Oral Q4H PRN Max 6/day Stacey Ramírez MD     • cholecalciferol  1,000 Units Oral Daily Carolina Stallings PA-C     • divalproex sodium  500 mg Oral BID Stacey Ramírez MD     • docusate sodium  100 mg Oral BID Carolina Stallings PA-C     • glycerin-hypromellose-  1 drop Both Eyes BID Carolina Stallings PA-C     • hydrOXYzine HCL  25 mg Oral Q6H PRN Max 4/day Stacey Ramírez MD     • LORazepam  1 mg Intramuscular Q6H PRN Max 3/day Stacey Ramírez MD     • LORazepam  0 5 mg Oral Q6H PRN Max 4/day Stacey Ramírez MD     • LORazepam  1 mg Oral Q6H PRN Max 3/day Stacey Ramírez MD     • magnesium hydroxide  30 mL Oral Daily PRN Carolina Stallings PA-C     • melatonin  3 mg Oral HS PRN Stacey Ramírez MD     • OLANZapine  5 mg Intramuscular Q3H PRN Max 3/day Stacey Ramírez MD     • OLANZapine  2 5 mg Oral Q4H PRN Max 6/day Stacey Ramírez MD     • OLANZapine  5 mg Oral Q4H PRN Max 3/day Stacey Ramírez MD     • OLANZapine  5 mg Oral Q3H PRN Max 3/day Stacey Ramírez MD     • polyethylene glycol  17 g Oral Daily PRN Carolina Stallings PA-C     • risperiDONE  2 mg Oral HS Stacey Ramírez MD     • tamsulosin  0 4 mg Oral Daily With Dinner Carolina Stallings PA-C         Suicide/Homicide Risk Assessment:  Risk of Harm to Self:   Based on today's assessment, Mario Portillo presents the following risk of harm to self: low    Risk of Harm to Others:  Based on today's assessment, Ida Jade presents the following risk of harm to others: low    The following interventions are recommended: behavioral checks every 7 minutes, continued hospitalization on locked unit    Behavioral Health Medications: All current active meds have been reviewed  Changes as in plan section above  Risks, benefits and possible side effects of Medications:   Patient does not verbalize understanding at this time and will require further explanation  Counseling / Coordination of Care:  Patient's progress discussed with staff in treatment team meeting  Medications, treatment progress and treatment plan reviewed with patient  Meliton Galaviz MD 01/19/23      This note was completed in part utilizing M-Modal Fluency Direct Software  Grammatical, translation, syntax errors, random word insertions, spelling mistakes, and incomplete sentences may be an occasional consequence of this system secondary to software limitations with voice recognition, ambient noise, and hardware issues  If you have any questions or concerns about the content, text, or information contained within the body of this dictation, please contact the provider for clarification

## 2023-01-20 NOTE — PLAN OF CARE
Problem: Ineffective Coping  Goal: Participates in unit activities  Description: Interventions:  - Provide therapeutic environment   - Provide required programming   - Redirect inappropriate behaviors   Outcome: Progressing n/a

## 2023-01-20 NOTE — NURSING NOTE
Patient remains in bed sleeping at this time  No acute behaviors observed  No complaints voiced  He appears to have slept through the overnight hours without issue  Continuous safety rounding in progress

## 2023-01-20 NOTE — NURSING NOTE
Patient visible for meals, but withdrawn to room  During assessment patient denies SI/HI/AVH depression and anxiety  Speech is loud and patient loses his train of thought easily, and asks the same questions repeatedly  Patient laughed inappropriately  Patient was also observed laughing inappropriately during lunch  Patient medication and meal compliant  Continuous visual safety checks performed throughout the shift  All safety precautions maintained

## 2023-01-20 NOTE — PROGRESS NOTES
01/19/23 1300   Activity/Group Checklist   Group   (Pet Therapy and Art Therapy Collaboration)   Attendance Attended   Attendance Duration (min) 31-45   Interactions Interacted appropriately   Affect/Mood Appropriate   Goals Achieved Identified feelings; Discussed coping strategies

## 2023-01-20 NOTE — PLAN OF CARE
Problem: Nutrition/Hydration-ADULT  Goal: Nutrient/Hydration intake appropriate for improving, restoring or maintaining nutritional needs  Description: Monitor and assess patient's nutrition/hydration status for malnutrition  Collaborate with interdisciplinary team and initiate plan and interventions as ordered  Monitor patient's weight and dietary intake as ordered or per policy  Utilize nutrition screening tool and intervene as necessary  Determine patient's food preferences and provide high-protein, high-caloric foods as appropriate       INTERVENTIONS:  - Monitor oral intake, urinary output, labs, and treatment plans  - Assess nutrition and hydration status and recommend course of action  - Evaluate amount of meals eaten  - Assist patient with eating if necessary   - Allow adequate time for meals  - Recommend/ encourage appropriate diets, oral nutritional supplements, and vitamin/mineral supplements  - Order, calculate, and assess calorie counts as needed  - Recommend, monitor, and adjust tube feedings and TPN/PPN based on assessed needs  - Assess need for intravenous fluids  - Provide specific nutrition/hydration education as appropriate  - Include patient/family/caregiver in decisions related to nutrition  Outcome: Progressing     Problem: SAFETY ADULT  Goal: Maintain or return to baseline ADL function  Description: INTERVENTIONS:  -  Assess patient's ability to carry out ADLs; assess patient's baseline for ADL function and identify physical deficits which impact ability to perform ADLs (bathing, care of mouth/teeth, toileting, grooming, dressing, etc )  - Assess/evaluate cause of self-care deficits   - Assess range of motion  - Assess patient's mobility; develop plan if impaired  - Assess patient's need for assistive devices and provide as appropriate  - Encourage maximum independence but intervene and supervise when necessary  - Involve family in performance of ADLs  - Assess for home care needs following discharge   - Consider OT consult to assist with ADL evaluation and planning for discharge  - Provide patient education as appropriate  Outcome: Progressing     Problem: Alteration in Thoughts and Perception  Goal: Verbalize thoughts and feelings  Description: Interventions:  - Promote a nonjudgmental and trusting relationship with the patient through active listening and therapeutic communication  - Assess patient's level of functioning, behavior and potential for risk  - Engage patient in 1 on 1 interactions  - Encourage patient to express fears, feelings, frustrations, and discuss symptoms    - [place] patient to reality, help patient recognize reality-based thinking   - Administer medications as ordered and assess for potential side effects  - Provide the patient education related to the signs and symptoms of the illness and desired effects of prescribed medications  Outcome: Progressing  Goal: Agree to be compliant with medication regime, as prescribed and report medication side effects  Description: Interventions:  - Offer appropriate PRN medication and supervise ingestion; conduct AIMS, as needed   Outcome: Progressing  Goal: Attend and participate in unit activities, including therapeutic, recreational, and educational groups  Description: Interventions:  -Encourage Visitation and family involvement in care  Outcome: Progressing  Goal: Complete daily ADLs, including personal hygiene independently, as able  Description: Interventions:  - Observe, teach, and assist patient with ADLS  - Monitor and promote a balance of rest/activity, with adequate nutrition and elimination   Outcome: Progressing     Problem: Ineffective Coping  Goal: Demonstrates healthy coping skills  Outcome: Progressing     Problem: Anxiety  Goal: Anxiety is at manageable level  Description: Interventions:  - Assess and monitor patient's anxiety level     - Monitor for signs and symptoms (heart palpitations, chest pain, shortness of breath, headaches, nausea, feeling jumpy, restlessness, irritable, apprehensive)  - Collaborate with interdisciplinary team and initiate plan and interventions as ordered    - [place] patient to unit/surroundings  - Explain treatment plan  - Encourage participation in care  - Encourage verbalization of concerns/fears  - Identify coping mechanisms  - Assist in developing anxiety-reducing skills  - Administer/offer alternative therapies  - Limit or eliminate stimulants  Outcome: Progressing     Problem: Ineffective Coping  Goal: Participates in unit activities  Description: Interventions:  - Provide therapeutic environment   - Provide required programming   - Redirect inappropriate behaviors   Outcome: Progressing

## 2023-01-20 NOTE — PROGRESS NOTES
Progress Note - Behavioral Health   Dean Jeff 58 y o  male MRN: [id number]  Unit/Bed#: [facility]01 Encounter: [id number]    Assessment/Plan   Principal Problem:    Schizophrenia [facility])  Active Problems:    Autistic disorder    BPH (benign prostatic hyperplasia)      Recommended Treatment/PLAN:  Depakote level on Monday, 1/23/23  Continue present medication regimen   No psychopharmacologic changes necessary at this moment; will continue to assess daily for further optimization  Continue with pharmacotherapy, group therapy, milieu therapy and occupational therapy  Continue to assess for adverse medication side effects  Encourage Dean Jeff to participate in nonverbal forms of therapy including journaling and art/music therapy  Continue frequent safety checks and vitals per unit protocol  Continue to engage CM/SW to assist with collateral, disposition planning, and the implementation of an individualized, patient-centered plan of care  Continue medical management by medical team   Case discussed with treatment team     Legal Status: [id number]  ------------------------------------------------------------    Subjective: All documentation including nursing notes, medication history to ensure medication adherence on the unit, labs, and vitals were reviewed  Irish Yung was evaluated this afternoon for continuity of care  Irish Yung is not in distress today but he DID ask me if he might be able to go home today  He is still very suspicious of medications, especially at night, when he insists on knowing what each pill is for  I had a conversation with him about this and he said, "Well I HAVE to know what every one of them is FOR!" I suggested that perhaps after all this time, he could learn to trust the people who are caring for him here  He said, "OK " Over the past 24 hours per nursing report, Irish Yung has been cooperative on the unit and compliant with medications  Today, Irish Yung is consenting for safety on the unit  Brad Zazueta reports feeling "ok " Brad Zazueat notes having ok sleep  Brad Zazueta states having a ok appetite  Brad Zazueta has been  taking the medications as prescribed and reporting no side effects  Patient was seen and examined today in his room     We discussed what I have mentioned above  Brad Zazueta denies suicidal ideations  Brad Zazueta denies  homicidal ideations  Regarding hallucinations, Brad Zazueta still says he hears voices on occasion but doesn't a[[ear to be responding to internal stimuli  PRNs overnight: none   VS: Reviewed, within normal limits    Progress Toward Goals: slow improvement    Psychiatric Review of Systems:  Behavior over the last 24 hours:  unchanged  Sleep: improved  Appetite: normal  Medication side effects: No   ROS: no complaints, today  Vital signs in last 24 hours:  Temp:  [97 6 °F (36 4 °C)-98 1 °F (36 7 °C)] 97 6 °F (36 4 °C)  HR:  [55-77] 55  Resp:  [16-18] 16  BP: (100-108)/(54-67) 104/55    Laboratory results:  I have personally reviewed all pertinent laboratory/tests results    Recent Results (from the past 48 hour(s))   Fingerstick Glucose (POCT)    Collection Time: 01/18/23  4:24 PM   Result Value Ref Range    POC Glucose 148 (H) 65 - 140 mg/dl   Fingerstick Glucose (POCT)    Collection Time: 01/19/23  7:46 AM   Result Value Ref Range    POC Glucose 126 65 - 140 mg/dl   Fingerstick Glucose (POCT)    Collection Time: 01/19/23  4:11 PM   Result Value Ref Range    POC Glucose 175 (H) 65 - 140 mg/dl   Fingerstick Glucose (POCT)    Collection Time: 01/20/23  7:55 AM   Result Value Ref Range    POC Glucose 121 65 - 140 mg/dl         Mental Status Evaluation:    Appearance:  adequate grooming, wearing paper scrubs   Behavior:  pleasant, cooperative   Speech:  normal rate and volume, coherent   Mood:  "ok"   Affect:  increased in intensity   Thought Process:  coherent, goal directed   Associations: intact associations   Thought Content:  mild paranoia   Perceptual Disturbances: Reports intermittent auditory hallucinations   Risk Potential: Suicidal ideation - None at present  Homicidal ideation - None  Potential for aggression - No   Sensorium:  oriented to person, place and situation   Memory:  recent and remote memory grossly intact   Consciousness:  alert and awake   Attention/Concentration: attention span and concentration are improving   Insight:  limited   Judgment: impaired   Gait/Station: normal gait/station   Motor Activity: no abnormal movements       Current Medications:  Current Facility-Administered Medications   Medication Dose Route Frequency Provider Last Rate   • acetaminophen  650 mg Oral Q4H PRN Liset Schulte MD     • acetaminophen  650 mg Oral Q4H PRN Liset Schulte MD     • acetaminophen  975 mg Oral Q6H PRN Liset Schulte MD     • benztropine  0 5 mg Oral Q4H PRN Max 6/day Liset Schulte MD     • cholecalciferol  1,000 Units Oral Daily Carolina Stallings PA-C     • divalproex sodium  500 mg Oral BID Liset Schulte MD     • docusate sodium  100 mg Oral BID Carolina Stallings PA-C     • glycerin-hypromellose-  1 drop Both Eyes BID Carolina Stallings PA-C     • hydrOXYzine HCL  25 mg Oral Q6H PRN Max 4/day Liset Schulte MD     • LORazepam  1 mg Intramuscular Q6H PRN Max 3/day Liset Schulte MD     • LORazepam  0 5 mg Oral Q6H PRN Max 4/day Liset Schulte MD     • LORazepam  1 mg Oral Q6H PRN Max 3/day Liset Schulte MD     • magnesium hydroxide  30 mL Oral Daily PRN Carolina Stallings PA-C     • melatonin  3 mg Oral HS PRN Liset Schulte MD     • OLANZapine  5 mg Intramuscular Q3H PRN Max 3/day Liset Schulte MD     • OLANZapine  2 5 mg Oral Q4H PRN Max 6/day Liset Schulte MD     • OLANZapine  5 mg Oral Q4H PRN Max 3/day Liset Schulte MD     • OLANZapine  5 mg Oral Q3H PRN Max 3/day Liset Schulte MD     • polyethylene glycol  17 g Oral Daily PRN Carolina Stallings PA-C     • risperiDONE  2 mg Oral HS Liset Schulte MD     • tamsulosin  0 4 mg Oral Daily With Dinner Carolina Stallings PA-C Suicide/Homicide Risk Assessment:  Risk of Harm to Self:   Based on today's assessment, Sanjuana Batista presents the following risk of harm to self: low    Risk of Harm to Others:  Based on today's assessment, Sanjuana Batista presents the following risk of harm to others: low    The following interventions are recommended: behavioral checks every 7 minutes, continued hospitalization on locked unit    Behavioral Health Medications: All current active meds have been reviewed  Risks, benefits and possible side effects of Medications:   Patient does not verbalize understanding at this time and will require further explanation  Counseling / Coordination of Care:  Patient's progress discussed with staff in treatment team meeting  Medications, treatment progress and treatment plan reviewed with patient  Jo-Ann Mehta MD 01/20/23      This note was completed in part utilizing M-Modal Fluency Direct Software  Grammatical, translation, syntax errors, random word insertions, spelling mistakes, and incomplete sentences may be an occasional consequence of this system secondary to software limitations with voice recognition, ambient noise, and hardware issues  If you have any questions or concerns about the content, text, or information contained within the body of this dictation, please contact the provider for clarification

## 2023-01-20 NOTE — CMS CERTIFICATION NOTE
Certification: Based upon physical, mental and social evaluations, I certify that inpatient psychiatric services are medically necessary for this patient for a duration of 6 midnights for the treatment of schizophenia  I further attest that an established written individualized plan of care has been implemented and is outlined in the patient's medical records

## 2023-01-20 NOTE — PROGRESS NOTES
01/20/23 0730   Activity/Group Checklist   Group Community meeting   Attendance Attended   Attendance Duration (min) 46-60   Interactions Interacted appropriately   Affect/Mood Appropriate   Goals Achieved Identified feelings; Able to listen to others; Able to engage in interactions; Able to self-disclose; Able to recieve feedback

## 2023-01-21 LAB
GLUCOSE SERPL-MCNC: 110 MG/DL (ref 65–140)
GLUCOSE SERPL-MCNC: 184 MG/DL (ref 65–140)

## 2023-01-21 RX ADMIN — GLYCERIN, HYPROMELLOSE, POLYETHYLENE GLYCOL 1 DROP: .2; .2; 1 LIQUID OPHTHALMIC at 08:54

## 2023-01-21 RX ADMIN — DOCUSATE SODIUM 100 MG: 100 CAPSULE, LIQUID FILLED ORAL at 08:54

## 2023-01-21 RX ADMIN — TAMSULOSIN HYDROCHLORIDE 0.4 MG: 0.4 CAPSULE ORAL at 17:09

## 2023-01-21 RX ADMIN — DIVALPROEX SODIUM 500 MG: 500 TABLET, EXTENDED RELEASE ORAL at 21:02

## 2023-01-21 RX ADMIN — DIVALPROEX SODIUM 500 MG: 500 TABLET, EXTENDED RELEASE ORAL at 08:54

## 2023-01-21 RX ADMIN — GLYCERIN, HYPROMELLOSE, POLYETHYLENE GLYCOL 1 DROP: .2; .2; 1 LIQUID OPHTHALMIC at 17:08

## 2023-01-21 RX ADMIN — RISPERIDONE 2 MG: 2 TABLET ORAL at 21:01

## 2023-01-21 RX ADMIN — DOCUSATE SODIUM 100 MG: 100 CAPSULE, LIQUID FILLED ORAL at 17:09

## 2023-01-21 RX ADMIN — Medication 1000 UNITS: at 08:54

## 2023-01-21 NOTE — NURSING NOTE
Patient was visible in the community this evening; spending time sitting in the dining area  He keeps to himself  He has been pleasant during staff interactions  Informs he had a nightmare during the previous overnight  Denies feeling anxious and/ or depressed  Denies SI, HI and hallucinations  Speech is loud and pressured; continues to laugh inappropriately  He was medication compliant at   Continuous safety rounding in progress

## 2023-01-21 NOTE — NURSING NOTE
Pt is pleasant & cooperative  Pt is compliant with medications  Pt denies any depression or anxiety  Pt denies any hallucinations, suicidal or homicidal ideations  Q 7 min checks maintained to monitor pt's behavior & safety  Pt up ad efrain & gait is steady  Pt does laugh inappropriately @ times

## 2023-01-21 NOTE — PROGRESS NOTES
Progress Note - Barb Cordero 58 y o  male MRN: [id number]    Unit/Bed#: [facility] Encounter: [id number]        Subjective:   Patient seen and examined at bedside after reviewing the chart and discussing the case with the caring staff  Patient examined at bedside  Patient is complaining of tendinitis  Physical Exam   Vitals: Blood pressure 104/58, pulse 55, temperature 97 8 °F (36 6 °C), temperature source Temporal, resp  rate 16, height 5' 11" (1 803 m), weight 68 7 kg (151 lb 6 4 oz), SpO2 96 %  ,Body mass index is 21 12 kg/m²  Constitutional: Patient in no acute distress  HEENT: PERR, EOMI, MMM  Cardiovascular: Normal rate and regular rhythm  Pulmonary/Chest: Effort normal and breath sounds normal    Abdomen: Soft, + BS, NT, no rebound, no guarding  Assessment/Plan:    Barb Cordero is a(n) 58 y o  male with schizophrenia      1  Type 2 diabetes mellitus  Diet controlled  Patient not on diabetic medication  Hgb a1c 6 1% on 1/2/2023  Carb controlled diet  Accu-Cheks twice daily  2  BPH  Continue Flomax 0 4 mg daily  3  Chronic bronchitis  Stable  Patient may use albuterol inhaler as needed  4  DJD/OA  Patient may take Tylenol as needed  5  Constipation  Patient started on Colace 100 mg twice daily, Miralax/MOM as needed  6  Vitamin D deficiency  Patient started on vitamin D3 1000 units daily  7  Tinnitus  Recommend f/u outpatient ENT

## 2023-01-21 NOTE — PLAN OF CARE
Problem: Nutrition/Hydration-ADULT  Goal: Nutrient/Hydration intake appropriate for improving, restoring or maintaining nutritional needs  Description: Monitor and assess patient's nutrition/hydration status for malnutrition  Collaborate with interdisciplinary team and initiate plan and interventions as ordered  Monitor patient's weight and dietary intake as ordered or per policy  Utilize nutrition screening tool and intervene as necessary  Determine patient's food preferences and provide high-protein, high-caloric foods as appropriate       INTERVENTIONS:  - Monitor oral intake, urinary output, labs, and treatment plans  - Assess nutrition and hydration status and recommend course of action  - Evaluate amount of meals eaten  - Assist patient with eating if necessary   - Allow adequate time for meals  - Recommend/ encourage appropriate diets, oral nutritional supplements, and vitamin/mineral supplements  - Order, calculate, and assess calorie counts as needed  - Recommend, monitor, and adjust tube feedings and TPN/PPN based on assessed needs  - Assess need for intravenous fluids  - Provide specific nutrition/hydration education as appropriate  - Include patient/family/caregiver in decisions related to nutrition  Outcome: Progressing     Problem: SAFETY ADULT  Goal: Maintain or return to baseline ADL function  Description: INTERVENTIONS:  -  Assess patient's ability to carry out ADLs; assess patient's baseline for ADL function and identify physical deficits which impact ability to perform ADLs (bathing, care of mouth/teeth, toileting, grooming, dressing, etc )  - Assess/evaluate cause of self-care deficits   - Assess range of motion  - Assess patient's mobility; develop plan if impaired  - Assess patient's need for assistive devices and provide as appropriate  - Encourage maximum independence but intervene and supervise when necessary  - Involve family in performance of ADLs  - Assess for home care needs following discharge   - Consider OT consult to assist with ADL evaluation and planning for discharge  - Provide patient education as appropriate  Outcome: Progressing     Problem: Alteration in Thoughts and Perception  Goal: Verbalize thoughts and feelings  Description: Interventions:  - Promote a nonjudgmental and trusting relationship with the patient through active listening and therapeutic communication  - Assess patient's level of functioning, behavior and potential for risk  - Engage patient in 1 on 1 interactions  - Encourage patient to express fears, feelings, frustrations, and discuss symptoms    - [place] patient to reality, help patient recognize reality-based thinking   - Administer medications as ordered and assess for potential side effects  - Provide the patient education related to the signs and symptoms of the illness and desired effects of prescribed medications  Outcome: Progressing  Goal: Agree to be compliant with medication regime, as prescribed and report medication side effects  Description: Interventions:  - Offer appropriate PRN medication and supervise ingestion; conduct AIMS, as needed   Outcome: Progressing  Goal: Complete daily ADLs, including personal hygiene independently, as able  Description: Interventions:  - Observe, teach, and assist patient with ADLS  - Monitor and promote a balance of rest/activity, with adequate nutrition and elimination   Outcome: Progressing     Problem: Ineffective Coping  Goal: Demonstrates healthy coping skills  Outcome: Progressing     Problem: Anxiety  Goal: Anxiety is at manageable level  Description: Interventions:  - Assess and monitor patient's anxiety level  - Monitor for signs and symptoms (heart palpitations, chest pain, shortness of breath, headaches, nausea, feeling jumpy, restlessness, irritable, apprehensive)  - Collaborate with interdisciplinary team and initiate plan and interventions as ordered    - [place] patient to unit/surroundings  - Explain treatment plan  - Encourage participation in care  - Encourage verbalization of concerns/fears  - Identify coping mechanisms  - Assist in developing anxiety-reducing skills  - Administer/offer alternative therapies  - Limit or eliminate stimulants  Outcome: Progressing     Problem: Ineffective Coping  Goal: Participates in unit activities  Description: Interventions:  - Provide therapeutic environment   - Provide required programming   - Redirect inappropriate behaviors   Outcome: Progressing

## 2023-01-21 NOTE — PROGRESS NOTES
Progress Note - Behavioral Health   Dean Jeff 58 y o  male MRN: [id number]  Unit/Bed#: [facility]01 Encounter: [id number]    Assessment/Plan   Principal Problem:    Schizophrenia ([address] )  Active Problems:    Autistic disorder    BPH (benign prostatic hyperplasia)  Patient was seen for continuing care and treatment  Case was discussed with the nursing staff  On examination today, the patient is seen lying in his bed and he is resting  According to the staff, the patient is reporting no new complaints or concerns  Has been compliant with medications and treatment  As reported, he still is suspicious of medications at times but has been taking them  Behavior over the last 24 hours has been unchanged  No new clinical issues or concerns were expressed by patient  According to staff, he is sleeping through the night  Appetite has been good  No complaints of medication side effects and no new medical concerns  Mental Status Evaluation:  Appearance:  age appropriate and casually dressed   Behavior:  Currently resting  According to the staff, his behavior on the unit has been pleasant  Speech:  normal volume   Mood:  euthymic   Affect:  constricted   Thought Process:  circumstantial   Associations: circumstantial associations   Thought Content:  No overt delusions  Still very suspicious of medications   Perceptual Disturbances: None   Risk Potential: Suicidal Ideations none  Homicidal Ideations none  Potential for Aggression No   Sensorium:  person, place and time/date   Memory:  recent and remote memory grossly intact   Consciousness:  alert and awake    Attention: attention span and concentration were age appropriate   Insight:  limited   Judgment: limited   Gait/Station: normal gait/station   Motor Activity: no abnormal movements     Progress Toward Goals: Patient is pleasant on the unit  He is still suspicious of his medications but there is improvement in his mood      Recommended Treatment: Continue with group therapy, milieu therapy and occupational therapy  Risks, benefits and possible side effects of Medications:   Risks, benefits, and possible side effects of medications explained to patient and patient verbalizes understanding  Medications:   current meds:   Current Facility-Administered Medications   Medication Dose Route Frequency   • acetaminophen (TYLENOL) tablet 650 mg  650 mg Oral Q4H PRN   • acetaminophen (TYLENOL) tablet 650 mg  650 mg Oral Q4H PRN   • acetaminophen (TYLENOL) tablet 975 mg  975 mg Oral Q6H PRN   • benztropine (COGENTIN) tablet 0 5 mg  0 5 mg Oral Q4H PRN Max 6/day   • cholecalciferol (VITAMIN D3) tablet 1,000 Units  1,000 Units Oral Daily   • divalproex sodium (DEPAKOTE ER) 24 hr tablet 500 mg  500 mg Oral BID   • docusate sodium (COLACE) capsule 100 mg  100 mg Oral BID   • glycerin-hypromellose- (ARTIFICIAL TEARS) ophthalmic solution 1 drop  1 drop Both Eyes BID   • hydrOXYzine HCL (ATARAX) tablet 25 mg  25 mg Oral Q6H PRN Max 4/day   • LORazepam (ATIVAN) injection 1 mg  1 mg Intramuscular Q6H PRN Max 3/day   • LORazepam (ATIVAN) tablet 0 5 mg  0 5 mg Oral Q6H PRN Max 4/day   • LORazepam (ATIVAN) tablet 1 mg  1 mg Oral Q6H PRN Max 3/day   • magnesium hydroxide (MILK OF MAGNESIA) oral suspension 30 mL  30 mL Oral Daily PRN   • melatonin tablet 3 mg  3 mg Oral HS PRN   • OLANZapine (ZyPREXA) IM injection 5 mg  5 mg Intramuscular Q3H PRN Max 3/day   • OLANZapine (ZyPREXA) tablet 2 5 mg  2 5 mg Oral Q4H PRN Max 6/day   • OLANZapine (ZyPREXA) tablet 5 mg  5 mg Oral Q4H PRN Max 3/day   • OLANZapine (ZyPREXA) tablet 5 mg  5 mg Oral Q3H PRN Max 3/day   • polyethylene glycol (MIRALAX) packet 17 g  17 g Oral Daily PRN   • risperiDONE (RisperDAL) tablet 2 mg  2 mg Oral HS   • tamsulosin (FLOMAX) capsule 0 4 mg  0 4 mg Oral Daily With Dinner     Labs: I have personally reviewed all pertinent laboratory/tests results     Most Recent Labs:   Lab Results   Component Value Date    WBC 4 32 01/12/2023    RBC 4 29 01/12/2023    HGB 15 2 01/12/2023    HCT 43 9 01/12/2023     01/12/2023    RDW 13 2 01/12/2023    NEUTROABS 1 92 01/12/2023    SODIUM 138 01/12/2023    K 3 5 01/12/2023     01/12/2023    CO2 27 01/12/2023    BUN 3 (L) 01/12/2023    CREATININE 0 83 01/12/2023    GLUC 110 01/12/2023    GLUF 89 12/07/2020    CALCIUM 8 9 01/12/2023    AST 21 01/12/2023    ALT 16 01/12/2023    ALKPHOS 67 01/12/2023    TP 6 7 01/12/2023    ALB 4 1 01/12/2023    TBILI 0 61 01/12/2023    VALPROICTOT 75 01/17/2023    [id number] 1 840 01/12/2023    HGBA1C 6 1 (H) 01/02/2023     01/02/2023       Counseling / Coordination of Care  Total floor / unit time spent today 30 minutes  Greater than 50% of total time was spent with the patient and / or family counseling and / or coordination of care   A description of the counseling / coordination of care: Medication management, treatment and chart review

## 2023-01-22 LAB
GLUCOSE SERPL-MCNC: 104 MG/DL (ref 65–140)
GLUCOSE SERPL-MCNC: 182 MG/DL (ref 65–140)

## 2023-01-22 RX ADMIN — DIVALPROEX SODIUM 500 MG: 500 TABLET, EXTENDED RELEASE ORAL at 08:50

## 2023-01-22 RX ADMIN — GLYCERIN, HYPROMELLOSE, POLYETHYLENE GLYCOL 1 DROP: .2; .2; 1 LIQUID OPHTHALMIC at 17:03

## 2023-01-22 RX ADMIN — RISPERIDONE 2 MG: 2 TABLET ORAL at 22:10

## 2023-01-22 RX ADMIN — DIVALPROEX SODIUM 500 MG: 500 TABLET, EXTENDED RELEASE ORAL at 22:10

## 2023-01-22 RX ADMIN — Medication 1000 UNITS: at 08:50

## 2023-01-22 RX ADMIN — DOCUSATE SODIUM 100 MG: 100 CAPSULE, LIQUID FILLED ORAL at 08:50

## 2023-01-22 RX ADMIN — TAMSULOSIN HYDROCHLORIDE 0.4 MG: 0.4 CAPSULE ORAL at 17:03

## 2023-01-22 RX ADMIN — GLYCERIN, HYPROMELLOSE, POLYETHYLENE GLYCOL 1 DROP: .2; .2; 1 LIQUID OPHTHALMIC at 08:50

## 2023-01-22 RX ADMIN — DOCUSATE SODIUM 100 MG: 100 CAPSULE, LIQUID FILLED ORAL at 17:03

## 2023-01-22 NOTE — NURSING NOTE
Patient visible on the unit  Calm and cooperative  Compliant with evening medications  Bright, feels he is ready to go home  Smiles and laughs during interactions  Denies SI, HI, hallucinations, anxiety, and depression  Q 7 minute safety checks maintained

## 2023-01-22 NOTE — PLAN OF CARE
Problem: Nutrition/Hydration-ADULT  Goal: Nutrient/Hydration intake appropriate for improving, restoring or maintaining nutritional needs  Description: Monitor and assess patient's nutrition/hydration status for malnutrition  Collaborate with interdisciplinary team and initiate plan and interventions as ordered  Monitor patient's weight and dietary intake as ordered or per policy  Utilize nutrition screening tool and intervene as necessary  Determine patient's food preferences and provide high-protein, high-caloric foods as appropriate       INTERVENTIONS:  - Monitor oral intake, urinary output, labs, and treatment plans  - Assess nutrition and hydration status and recommend course of action  - Evaluate amount of meals eaten  - Assist patient with eating if necessary   - Allow adequate time for meals  - Recommend/ encourage appropriate diets, oral nutritional supplements, and vitamin/mineral supplements  - Order, calculate, and assess calorie counts as needed  - Recommend, monitor, and adjust tube feedings and TPN/PPN based on assessed needs  - Assess need for intravenous fluids  - Provide specific nutrition/hydration education as appropriate  - Include patient/family/caregiver in decisions related to nutrition  Outcome: Progressing     Problem: Alteration in Thoughts and Perception  Goal: Verbalize thoughts and feelings  Description: Interventions:  - Promote a nonjudgmental and trusting relationship with the patient through active listening and therapeutic communication  - Assess patient's level of functioning, behavior and potential for risk  - Engage patient in 1 on 1 interactions  - Encourage patient to express fears, feelings, frustrations, and discuss symptoms    - [place] patient to reality, help patient recognize reality-based thinking   - Administer medications as ordered and assess for potential side effects  - Provide the patient education related to the signs and symptoms of the illness and desired effects of prescribed medications  Outcome: Progressing  Goal: Agree to be compliant with medication regime, as prescribed and report medication side effects  Description: Interventions:  - Offer appropriate PRN medication and supervise ingestion; conduct AIMS, as needed   Outcome: Progressing  Goal: Attend and participate in unit activities, including therapeutic, recreational, and educational groups  Description: Interventions:  -Encourage Visitation and family involvement in care  Outcome: Progressing  Goal: Complete daily ADLs, including personal hygiene independently, as able  Description: Interventions:  - Observe, teach, and assist patient with ADLS  - Monitor and promote a balance of rest/activity, with adequate nutrition and elimination   Outcome: Progressing     Problem: Ineffective Coping  Goal: Demonstrates healthy coping skills  Outcome: Progressing     Problem: Anxiety  Goal: Anxiety is at manageable level  Description: Interventions:  - Assess and monitor patient's anxiety level  - Monitor for signs and symptoms (heart palpitations, chest pain, shortness of breath, headaches, nausea, feeling jumpy, restlessness, irritable, apprehensive)  - Collaborate with interdisciplinary team and initiate plan and interventions as ordered    - [place] patient to unit/surroundings  - Explain treatment plan  - Encourage participation in care  - Encourage verbalization of concerns/fears  - Identify coping mechanisms  - Assist in developing anxiety-reducing skills  - Administer/offer alternative therapies  - Limit or eliminate stimulants  Outcome: Progressing     Problem: Ineffective Coping  Goal: Participates in unit activities  Description: Interventions:  - Provide therapeutic environment   - Provide required programming   - Redirect inappropriate behaviors   Outcome: Progressing

## 2023-01-22 NOTE — PROGRESS NOTES
Progress Note - Behavioral Health   Nancy Harrington 58 y o  male MRN: [id number]  Unit/Bed#: [facility] Encounter: [id number]    Assessment/Plan   Principal Problem:    Schizophrenia ([address] )  Active Problems:    Autistic disorder    BPH (benign prostatic hyperplasia)  Patient was seen for continuing care and treatment  Case was discussed with nursing staff  Per staff report, the patient continues to laugh inappropriately at times  Continues to deny any depression or anxiety and denies any suicidal ideation  He is pleasant and cooperative on approach  He offers no new complaints or concerns  Visible on the unit  Behavior over the last 24 hours has been unchanged  No new clinical issues or concerns were expressed by the patient or staff over the last 24 hours  He is sleeping well and eating well  Discharge planning and disposition are ongoing  Mental Status Evaluation:  Appearance:  age appropriate and casually dressed   Behavior:  cooperative   Speech:  normal volume   Mood:  euthymic   Affect:  normal and Inappropriate at times   Thought Process:  loose associations   Associations: loose associations   Thought Content:  Denies any overt delusions   Perceptual Disturbances: None   Risk Potential: Suicidal Ideations none  Homicidal Ideations none  Potential for Aggression No   Sensorium:  person and place   Memory:  recent and remote memory grossly intact   Consciousness:  alert and awake    Attention: attention span and concentration were age appropriate   Insight:  limited   Judgment: limited   Gait/Station: normal gait/station   Motor Activity: no abnormal movements     Progress Toward Goals: Pleasant on the unit  Staff reports that he still is laughing inappropriately at times  Sometimes isolates to her room as other times is visible for meals and meds  Recommended Treatment: Continue with group therapy, milieu therapy and occupational therapy        Risks, benefits and possible side effects of Medications:   Risks, benefits, and possible side effects of medications explained to patient and patient verbalizes understanding  Medications:   current meds:   Current Facility-Administered Medications   Medication Dose Route Frequency   • acetaminophen (TYLENOL) tablet 650 mg  650 mg Oral Q4H PRN   • acetaminophen (TYLENOL) tablet 650 mg  650 mg Oral Q4H PRN   • acetaminophen (TYLENOL) tablet 975 mg  975 mg Oral Q6H PRN   • benztropine (COGENTIN) tablet 0 5 mg  0 5 mg Oral Q4H PRN Max 6/day   • cholecalciferol (VITAMIN D3) tablet 1,000 Units  1,000 Units Oral Daily   • divalproex sodium (DEPAKOTE ER) 24 hr tablet 500 mg  500 mg Oral BID   • docusate sodium (COLACE) capsule 100 mg  100 mg Oral BID   • glycerin-hypromellose- (ARTIFICIAL TEARS) ophthalmic solution 1 drop  1 drop Both Eyes BID   • hydrOXYzine HCL (ATARAX) tablet 25 mg  25 mg Oral Q6H PRN Max 4/day   • LORazepam (ATIVAN) injection 1 mg  1 mg Intramuscular Q6H PRN Max 3/day   • LORazepam (ATIVAN) tablet 0 5 mg  0 5 mg Oral Q6H PRN Max 4/day   • LORazepam (ATIVAN) tablet 1 mg  1 mg Oral Q6H PRN Max 3/day   • magnesium hydroxide (MILK OF MAGNESIA) oral suspension 30 mL  30 mL Oral Daily PRN   • melatonin tablet 3 mg  3 mg Oral HS PRN   • OLANZapine (ZyPREXA) IM injection 5 mg  5 mg Intramuscular Q3H PRN Max 3/day   • OLANZapine (ZyPREXA) tablet 2 5 mg  2 5 mg Oral Q4H PRN Max 6/day   • OLANZapine (ZyPREXA) tablet 5 mg  5 mg Oral Q4H PRN Max 3/day   • OLANZapine (ZyPREXA) tablet 5 mg  5 mg Oral Q3H PRN Max 3/day   • polyethylene glycol (MIRALAX) packet 17 g  17 g Oral Daily PRN   • risperiDONE (RisperDAL) tablet 2 mg  2 mg Oral HS   • tamsulosin (FLOMAX) capsule 0 4 mg  0 4 mg Oral Daily With Dinner     Labs: I have personally reviewed all pertinent laboratory/tests results     Most Recent Labs:   Lab Results   Component Value Date    WBC 4 32 01/12/2023    RBC 4 29 01/12/2023    HGB 15 2 01/12/2023    HCT 43 9 01/12/2023     01/12/2023    RDW 13 2 01/12/2023    NEUTROABS 1 92 01/12/2023    SODIUM 138 01/12/2023    K 3 5 01/12/2023     01/12/2023    CO2 27 01/12/2023    BUN 3 (L) 01/12/2023    CREATININE 0 83 01/12/2023    GLUC 110 01/12/2023    GLUF 89 12/07/2020    CALCIUM 8 9 01/12/2023    AST 21 01/12/2023    ALT 16 01/12/2023    ALKPHOS 67 01/12/2023    TP 6 7 01/12/2023    ALB 4 1 01/12/2023    TBILI 0 61 01/12/2023    VALPROICTOT 75 01/17/2023    [id number] 1 840 01/12/2023    HGBA1C 6 1 (H) 01/02/2023     01/02/2023       Counseling / Coordination of Care  Total floor / unit time spent today 30 minutes  Greater than 50% of total time was spent with the patient and / or family counseling and / or coordination of care   A description of the counseling / coordination of care: Occasion management, treatment and chart review

## 2023-01-22 NOTE — PROGRESS NOTES
Progress Note - Alex Mack 58 y o  male MRN: [id number]    Unit/Bed#: [facility]01 Encounter: [id number]        Subjective:   Patient seen and examined at bedside after reviewing the chart and discussing the case with the caring staff  Patient examined at bedside  Patient choked on his candy yesterday evening requiring Heimlich maneuver  Physical Exam   Vitals: Blood pressure 122/68, pulse 68, temperature 97 5 °F (36 4 °C), temperature source Temporal, resp  rate 18, height 5' 11" (1 803 m), weight 68 7 kg (151 lb 6 4 oz), SpO2 99 %  ,Body mass index is 21 12 kg/m²  Constitutional: Patient in no acute distress  HEENT: PERR, EOMI, MMM  Cardiovascular: Normal rate and regular rhythm  Pulmonary/Chest: Effort normal and breath sounds normal    Abdomen: Soft, + BS, NT, no rebound, no guarding  Assessment/Plan:    Alex Mack is a(n) 58 y o  male with schizophrenia      1  Type 2 diabetes mellitus  Diet controlled  Patient not on diabetic medication  Hgb a1c 6 1% on 1/2/2023  Carb controlled diet  Accu-Cheks twice daily  2  BPH  Continue Flomax 0 4 mg daily  3  Chronic bronchitis  Stable  Patient may use albuterol inhaler as needed  4  DJD/OA  Patient may take Tylenol as needed  5  Constipation  Patient started on Colace 100 mg twice daily, Miralax/MOM as needed  6  Vitamin D deficiency  Patient started on vitamin D3 1000 units daily  7  Tinnitus  Recommend f/u outpatient ENT

## 2023-01-22 NOTE — NURSING NOTE
Pt continues to laugh inappropriately @ times  Pt denies any depression or anxiety  Pt denies any hallucinations, suicidal or homicidal ideations  Q 7 min checks maintained to monitor pt's behavior & safety  Pt is compliant with medications  Pt up ad efrain & gait is steady  Pt is pleasant & cooperative

## 2023-01-23 LAB
GLUCOSE SERPL-MCNC: 100 MG/DL (ref 65–140)
GLUCOSE SERPL-MCNC: 159 MG/DL (ref 65–140)
VALPROATE SERPL-MCNC: 85 UG/ML (ref 50–100)

## 2023-01-23 RX ADMIN — GLYCERIN, HYPROMELLOSE, POLYETHYLENE GLYCOL 1 DROP: .2; .2; 1 LIQUID OPHTHALMIC at 17:09

## 2023-01-23 RX ADMIN — RISPERIDONE 2 MG: 2 TABLET ORAL at 20:59

## 2023-01-23 RX ADMIN — DOCUSATE SODIUM 100 MG: 100 CAPSULE, LIQUID FILLED ORAL at 17:00

## 2023-01-23 RX ADMIN — DIVALPROEX SODIUM 500 MG: 500 TABLET, EXTENDED RELEASE ORAL at 20:59

## 2023-01-23 RX ADMIN — GLYCERIN, HYPROMELLOSE, POLYETHYLENE GLYCOL 1 DROP: .2; .2; 1 LIQUID OPHTHALMIC at 08:17

## 2023-01-23 RX ADMIN — DIVALPROEX SODIUM 500 MG: 500 TABLET, EXTENDED RELEASE ORAL at 08:17

## 2023-01-23 RX ADMIN — DOCUSATE SODIUM 100 MG: 100 CAPSULE, LIQUID FILLED ORAL at 08:17

## 2023-01-23 RX ADMIN — Medication 1000 UNITS: at 08:17

## 2023-01-23 RX ADMIN — TAMSULOSIN HYDROCHLORIDE 0.4 MG: 0.4 CAPSULE ORAL at 16:58

## 2023-01-23 NOTE — NURSING NOTE
Patient visible on the unit  Pleasant and cooperative  Social with peers and staff  Denies SI, HI, hallucinations, anxiety, and depression  Compliant with medications  Bright, hoping to be discharged soon  Q 7 minute checks maintained

## 2023-01-23 NOTE — NUTRITION
01/23/23 1035   Biochemical Data,Medical Tests, and Procedures   Biochemical Data/Medical Tests/Procedures Lab values reviewed; Meds reviewed   Labs (Comment) BG elevated at times however has been <200mg/dL   Meds (Comment) cogentin, Vit D, depakote, colace, atarax, ativan, MOM, melatonin, zyprexa, risperdal   Nutrition-Focused Physical Exam   Nutrition-Focused Physical Exam Findings RN skin assessment reviewed; No skin issues documented   Medical-Related Concerns PMH reviewed   Adequacy of Intake   Nutrition Modality PO   Feeding Route   PO Independent   Current PO Intake   Current Diet Order CCD 3 diet thin liquids   Current Meal Intake %   Estimated calorie intake compared to estimated need Nutrient needs met  PES Statement   Problem No nutrition diagnosis   Recommendations/Interventions   Malnutrition/BMI Present No  (does not meet criteria)   Summary CCD 3 diet thin liquids  Meal completions 100%  No supplements  Current weight requested  1/23 155#, BMI=21 67; 4# gain since admission 1/12 151#, desired  BG elevated at times  Skin intact  He states his appetite is okay  He reports his throat is a little sore, discussed with staff  Continue diet as prescribed     Interventions/Recommendations Continue current diet order   Education Assessment   Education Education not indicated at this time;Patient/caregiver not appropriate for education at this time   Nutrition Complexity Risk   Nutrition complexity level Low risk   Follow up date 02/06/23

## 2023-01-23 NOTE — PROGRESS NOTES
01/23/23 1330   Activity/Group Checklist   Group   (Stressor Creatures Art Therapy)   Attendance Did not attend  (AT group offered, PT elected to remain in room)

## 2023-01-23 NOTE — NURSING NOTE
Patient visible on the unit  He continues to laugh inappropriately   He stated he slept last night but had a nightmare  Denied anxiety,depression,SI,HI, or hallucinations  Compliant with medications  Ambulates the halls  Pleasant and cooperative  Q 7 minute safety checks maintained

## 2023-01-23 NOTE — PROGRESS NOTES
Progress Note - Dinesh Bingham 58 y o  male MRN: [id number]    Unit/Bed#: [facility] 203-01 Encounter: [id number]        Subjective:   Patient seen and examined at bedside after reviewing the chart and discussing the case with the caring staff  Patient examined at bedside  Patient reports no acute symptoms  Physical Exam   Vitals: Blood pressure 101/61, pulse 55, temperature (!) 97 2 °F (36 2 °C), temperature source Temporal, resp  rate 16, height 5' 11" (1 803 m), weight 70 5 kg (155 lb 6 4 oz), SpO2 96 %  ,Body mass index is 21 67 kg/m²  Constitutional: Patient in no acute distress  HEENT: PERR, EOMI, MMM  Cardiovascular: Normal rate and regular rhythm  Pulmonary/Chest: Effort normal and breath sounds normal    Abdomen: Soft, + BS, NT, no rebound, no guarding  Assessment/Plan:    Dinesh Bingham is a(n) 58 y o  male with schizophrenia      1  Type 2 diabetes mellitus  Diet controlled  Patient not on diabetic medication  Hgb a1c 6 1% on 1/2/2023  Carb controlled diet  Accu-Cheks twice daily  2  BPH  Continue Flomax 0 4 mg daily  3  Chronic bronchitis  Stable  Patient may use albuterol inhaler as needed  4  DJD/OA  Patient may take Tylenol as needed  5  Constipation  Patient started on Colace 100 mg twice daily, Miralax/MOM as needed  6  Vitamin D deficiency  Patient started on vitamin D3 1000 units daily  7  Tinnitus  Recommend f/u outpatient ENT

## 2023-01-23 NOTE — PLAN OF CARE
Problem: Nutrition/Hydration-ADULT  Goal: Nutrient/Hydration intake appropriate for improving, restoring or maintaining nutritional needs  Description: Monitor and assess patient's nutrition/hydration status for malnutrition  Collaborate with interdisciplinary team and initiate plan and interventions as ordered  Monitor patient's weight and dietary intake as ordered or per policy  Utilize nutrition screening tool and intervene as necessary  Determine patient's food preferences and provide high-protein, high-caloric foods as appropriate       INTERVENTIONS:  - Monitor oral intake, urinary output, labs, and treatment plans  - Assess nutrition and hydration status and recommend course of action  - Evaluate amount of meals eaten  - Assist patient with eating if necessary   - Allow adequate time for meals  - Recommend/ encourage appropriate diets, oral nutritional supplements, and vitamin/mineral supplements  - Order, calculate, and assess calorie counts as needed  - Recommend, monitor, and adjust tube feedings and TPN/PPN based on assessed needs  - Assess need for intravenous fluids  - Provide specific nutrition/hydration education as appropriate  - Include patient/family/caregiver in decisions related to nutrition  Outcome: Progressing       Problem: Alteration in Thoughts and Perception  Goal: Verbalize thoughts and feelings  Description: Interventions:  - Promote a nonjudgmental and trusting relationship with the patient through active listening and therapeutic communication  - Assess patient's level of functioning, behavior and potential for risk  - Engage patient in 1 on 1 interactions  - Encourage patient to express fears, feelings, frustrations, and discuss symptoms    - [place] patient to reality, help patient recognize reality-based thinking   - Administer medications as ordered and assess for potential side effects  - Provide the patient education related to the signs and symptoms of the illness and desired effects of prescribed medications  Outcome: Progressing  Goal: Refrain from acting on delusional thinking/internal stimuli  Description: Interventions:  - Monitor patient closely, per order   - Utilize least restrictive measures   - Set reasonable limits, give positive feedback for acceptable   - Administer medications as ordered and monitor of potential side effects  Outcome: Progressing  Goal: Agree to be compliant with medication regime, as prescribed and report medication side effects  Description: Interventions:  - Offer appropriate PRN medication and supervise ingestion; conduct AIMS, as needed   Outcome: Progressing

## 2023-01-23 NOTE — PROGRESS NOTES
01/20/23 8969   Activity/Group Checklist   Group   (Creative Expressions)   Attendance Attended   Attendance Duration (min) 31-45   Interactions Interacted appropriately   Affect/Mood Appropriate   Goals Achieved Identified feelings; Discussed coping strategies

## 2023-01-23 NOTE — PROGRESS NOTES
Progress Note - Behavioral Health   Marques Landin 58 y o  male MRN: [id number]  Unit/Bed#: [facility] 203-01 Encounter: [id number]    Assessment/Plan   Principal Problem:    Schizophrenia ([address] )  Active Problems:    Autistic disorder    BPH (benign prostatic hyperplasia)      Behavior over the last 24 hours:  unchanged  Sleep: normal  Appetite: normal  Medication side effects: No  ROS: no complaints and all other systems are negative    Subjective: Heladio José Miguel was seen today for psychiatric follow-up  Patient calm cooperative  Withdrawn to room  Patient continues to laugh inappropriately  He denies SI/HI/AVH, does not appear to be internally preoccupied  Mental Status Evaluation:  Appearance:  age appropriate and casually dressed   Behavior:  cooperative   Speech:  normal pitch and normal volume   Mood:  euthymic   Affect:  mood-congruent   Thought Process:  circumstantial   Associations: circumstantial associations   Thought Content:  no overt delusions   Perceptual Disturbances: denies AVH, does not appear internally preoccupied   Risk Potential: Suicidal Ideations none  Homicidal Ideations none  Potential for Aggression No   Sensorium:  person and place   Memory:  recent and remote memory grossly intact   Consciousness:  alert and awake    Attention: attention span and concentration were age appropriate   Insight:  limited   Judgment: limited   Gait/Station: normal gait/station   Motor Activity: no abnormal movements     Progress Toward Goals: Patient continues to laugh inappropriately at times on the unit  He is compliant with current psychotropic medication regimen  Denies side effects to current psychotropic medication regimen  Will continue current psychotropic medication regimen  No discharge date yet  Recommended Treatment: Continue with group therapy, milieu therapy and occupational therapy        Risks, benefits and possible side effects of Medications:   Risks, benefits, and possible side effects of medications explained to patient and patient verbalizes understanding  Medications:   all current active meds have been reviewed and current meds:   Current Facility-Administered Medications   Medication Dose Route Frequency   • acetaminophen (TYLENOL) tablet 650 mg  650 mg Oral Q4H PRN   • acetaminophen (TYLENOL) tablet 650 mg  650 mg Oral Q4H PRN   • acetaminophen (TYLENOL) tablet 975 mg  975 mg Oral Q6H PRN   • benztropine (COGENTIN) tablet 0 5 mg  0 5 mg Oral Q4H PRN Max 6/day   • cholecalciferol (VITAMIN D3) tablet 1,000 Units  1,000 Units Oral Daily   • divalproex sodium (DEPAKOTE ER) 24 hr tablet 500 mg  500 mg Oral BID   • docusate sodium (COLACE) capsule 100 mg  100 mg Oral BID   • glycerin-hypromellose- (ARTIFICIAL TEARS) ophthalmic solution 1 drop  1 drop Both Eyes BID   • hydrOXYzine HCL (ATARAX) tablet 25 mg  25 mg Oral Q6H PRN Max 4/day   • LORazepam (ATIVAN) injection 1 mg  1 mg Intramuscular Q6H PRN Max 3/day   • LORazepam (ATIVAN) tablet 0 5 mg  0 5 mg Oral Q6H PRN Max 4/day   • LORazepam (ATIVAN) tablet 1 mg  1 mg Oral Q6H PRN Max 3/day   • magnesium hydroxide (MILK OF MAGNESIA) oral suspension 30 mL  30 mL Oral Daily PRN   • melatonin tablet 3 mg  3 mg Oral HS PRN   • OLANZapine (ZyPREXA) IM injection 5 mg  5 mg Intramuscular Q3H PRN Max 3/day   • OLANZapine (ZyPREXA) tablet 2 5 mg  2 5 mg Oral Q4H PRN Max 6/day   • OLANZapine (ZyPREXA) tablet 5 mg  5 mg Oral Q4H PRN Max 3/day   • OLANZapine (ZyPREXA) tablet 5 mg  5 mg Oral Q3H PRN Max 3/day   • polyethylene glycol (MIRALAX) packet 17 g  17 g Oral Daily PRN   • risperiDONE (RisperDAL) tablet 2 mg  2 mg Oral HS   • tamsulosin (FLOMAX) capsule 0 4 mg  0 4 mg Oral Daily With Dinner     Labs: I have personally reviewed all pertinent laboratory/tests results     Most Recent Labs:   Lab Results   Component Value Date    WBC 4 32 01/12/2023    RBC 4 29 01/12/2023    HGB 15 2 01/12/2023    HCT 43 9 01/12/2023     01/12/2023    RDW 13 2 01/12/2023    NEUTROABS 1 92 01/12/2023    SODIUM 138 01/12/2023    K 3 5 01/12/2023     01/12/2023    CO2 27 01/12/2023    BUN 3 (L) 01/12/2023    CREATININE 0 83 01/12/2023    GLUC 110 01/12/2023    GLUF 89 12/07/2020    CALCIUM 8 9 01/12/2023    AST 21 01/12/2023    ALT 16 01/12/2023    ALKPHOS 67 01/12/2023    TP 6 7 01/12/2023    ALB 4 1 01/12/2023    TBILI 0 61 01/12/2023    VALPROICTOT 85 01/23/2023    [id number] 1 840 01/12/2023    HGBA1C 6 1 (H) 01/02/2023     01/02/2023       Counseling / Coordination of Care  Total floor / unit time spent today 25 minutes

## 2023-01-23 NOTE — PROGRESS NOTES
01/23/23 0742   Activity/Group Checklist   Group Community meeting   Attendance Attended   Attendance Duration (min) 31-45  (pt joined grp late)   Interactions Interacted appropriately   Affect/Mood Appropriate   Goals Achieved Identified feelings; Able to listen to others; Able to engage in interactions; Able to self-disclose; Able to recieve feedback

## 2023-01-24 LAB
GLUCOSE SERPL-MCNC: 123 MG/DL (ref 65–140)
GLUCOSE SERPL-MCNC: 183 MG/DL (ref 65–140)

## 2023-01-24 RX ORDER — PANTOPRAZOLE SODIUM 40 MG/1
40 TABLET, DELAYED RELEASE ORAL
Status: DISCONTINUED | OUTPATIENT
Start: 2023-01-24 | End: 2023-01-27 | Stop reason: HOSPADM

## 2023-01-24 RX ADMIN — RISPERIDONE 2 MG: 2 TABLET ORAL at 21:49

## 2023-01-24 RX ADMIN — PANTOPRAZOLE SODIUM 40 MG: 40 TABLET, DELAYED RELEASE ORAL at 12:18

## 2023-01-24 RX ADMIN — DOCUSATE SODIUM 100 MG: 100 CAPSULE, LIQUID FILLED ORAL at 17:01

## 2023-01-24 RX ADMIN — DIVALPROEX SODIUM 500 MG: 500 TABLET, EXTENDED RELEASE ORAL at 21:49

## 2023-01-24 RX ADMIN — DIVALPROEX SODIUM 500 MG: 500 TABLET, EXTENDED RELEASE ORAL at 08:35

## 2023-01-24 RX ADMIN — DOCUSATE SODIUM 100 MG: 100 CAPSULE, LIQUID FILLED ORAL at 08:36

## 2023-01-24 RX ADMIN — GLYCERIN, HYPROMELLOSE, POLYETHYLENE GLYCOL 1 DROP: .2; .2; 1 LIQUID OPHTHALMIC at 17:02

## 2023-01-24 RX ADMIN — GLYCERIN, HYPROMELLOSE, POLYETHYLENE GLYCOL 1 DROP: .2; .2; 1 LIQUID OPHTHALMIC at 08:36

## 2023-01-24 RX ADMIN — TAMSULOSIN HYDROCHLORIDE 0.4 MG: 0.4 CAPSULE ORAL at 17:01

## 2023-01-24 RX ADMIN — Medication 1000 UNITS: at 08:36

## 2023-01-24 NOTE — PROGRESS NOTES
01/24/23 0749   Activity/Group Checklist   Group Community meeting   Attendance Did not attend  (Pt offered grp; pt remained in his room sleeping)

## 2023-01-24 NOTE — SOCIAL WORK
CM placed call to PT [facility] ACT team, spoke with Lenny Lorraine, reviewed plan for discharge by end of week, updated on pt status and plan of care, David reflected he understood  Lenny Farfan is scheduled to come see PT in person on Thursday in the am  Lenny Farfan expressed concerns about PT not taking medications when he returns home, KAYLEN reviewed that PT has been compliant here without issue and in conversation agrees to take his medications when he returns home; that although she understands this is a concern would not be reason for PT to remain inpatient, Lenny Farfan reflected he understood  Confirmed members on PT team   Dr Bucky Concepcion MD-psychiatrist  Andriette Aase manager  Maria Esther Alvarenga-  Loly Kay-  Herbert Ballard  Team manages PT medications, provide bubble packs  Scripts to be sent to [address] [place] [address]  Will follow up on Thursday, call ended mutually

## 2023-01-24 NOTE — NURSING NOTE
Patient was out on the milieu this evening  Patient denied all psych symptoms  Patient was pleasant, cooperative, and med compliant  Patient has inappropriately laughter at times  Will CTM  Continuous patient safety checks ongoing

## 2023-01-24 NOTE — PROGRESS NOTES
01/24/23 1330   Activity/Group Checklist   Group   (Self Projection and Creative Expression)   Attendance Did not attend  (AT group offered, PT elected to remain in room)

## 2023-01-24 NOTE — PROGRESS NOTES
Progress Note - Marques Landin 58 y o  male MRN: [id number]    Unit/Bed#: [facility]01 Encounter: [id number]        Subjective:   Patient seen and examined at bedside after reviewing the chart and discussing the case with the caring staff  Patient examined at bedside  Patient is complaining of acid reflux symptoms with burning pain in the epigastrium  Physical Exam   Vitals: Blood pressure 98/59, pulse 56, temperature (!) 97 1 °F (36 2 °C), temperature source Temporal, resp  rate 16, height 5' 11" (1 803 m), weight 70 5 kg (155 lb 6 4 oz), SpO2 98 %  ,Body mass index is 21 67 kg/m²  Constitutional: Patient in no acute distress  HEENT: PERR, EOMI, MMM  Cardiovascular: Normal rate and regular rhythm  Pulmonary/Chest: Effort normal and breath sounds normal    Abdomen: Soft, + BS, NT, no rebound, no guarding  Assessment/Plan:    Marques Landin is a(n) 58 y o  male with schizophrenia      1  Type 2 diabetes mellitus  Diet controlled  Patient not on diabetic medication  Hgb a1c 6 1% on 1/2/2023  Carb controlled diet  Accu-Cheks twice daily  2  BPH  Continue Flomax 0 4 mg daily  3  Chronic bronchitis  Stable  Patient may use albuterol inhaler as needed  4  DJD/OA  Patient may take Tylenol as needed  5  Constipation  Patient started on Colace 100 mg twice daily, Miralax/MOM as needed  6  Vitamin D deficiency  Patient started on vitamin D3 1000 units daily  7  Tinnitus  Recommend f/u outpatient ENT  8   GERD  I will put the patient on Protonix 40 mg daily along with Maalox as needed

## 2023-01-24 NOTE — PROGRESS NOTES
01/23/23 2197   Team Meeting   Meeting Type Daily Rounds   Team Members Present   Team Members Present Physician;Nurse;;Occupational Therapist   Physician Team Member Dr Ellen Sanchez MD; RK Milan   Nursing Team Member Brendan Morrison RN   Care Management Team Member MS Dheeraj, [facility]   OT Team Member [place], [place]   Patient/Family Present   Patient Present No   Patient's Family Present No   Inappropriate with peers, laughs inappropriately, medication compliant, AH continue

## 2023-01-24 NOTE — NURSING NOTE
Patient visible on the unit  He stated he slept well last night  Denied anxiety,depression,SI,HI, or hallucinations  Medication compliant  He ambulates the halls  He is social with staff  Pleasant and cooperative  Offers no complaints  Q 7 minute safety checks maintained

## 2023-01-24 NOTE — SOCIAL WORK
CM met with PT for PT check in  PT was pleasant in conversation, inquired when he will discharge and where he would go  CM reviewed that she will be reaching out to his act team and brother to review plan and possible by end of week, PT in agreement  PT denies si/hi/ah/vh, anxiety and depression

## 2023-01-24 NOTE — PLAN OF CARE
Problem: Nutrition/Hydration-ADULT  Goal: Nutrient/Hydration intake appropriate for improving, restoring or maintaining nutritional needs  Description: Monitor and assess patient's nutrition/hydration status for malnutrition  Collaborate with interdisciplinary team and initiate plan and interventions as ordered  Monitor patient's weight and dietary intake as ordered or per policy  Utilize nutrition screening tool and intervene as necessary  Determine patient's food preferences and provide high-protein, high-caloric foods as appropriate       INTERVENTIONS:  - Monitor oral intake, urinary output, labs, and treatment plans  - Assess nutrition and hydration status and recommend course of action  - Evaluate amount of meals eaten  - Assist patient with eating if necessary   - Allow adequate time for meals  - Recommend/ encourage appropriate diets, oral nutritional supplements, and vitamin/mineral supplements  - Order, calculate, and assess calorie counts as needed  - Recommend, monitor, and adjust tube feedings and TPN/PPN based on assessed needs  - Assess need for intravenous fluids  - Provide specific nutrition/hydration education as appropriate  - Include patient/family/caregiver in decisions related to nutrition  Outcome: Progressing     Problem: Alteration in Thoughts and Perception  Goal: Verbalize thoughts and feelings  Description: Interventions:  - Promote a nonjudgmental and trusting relationship with the patient through active listening and therapeutic communication  - Assess patient's level of functioning, behavior and potential for risk  - Engage patient in 1 on 1 interactions  - Encourage patient to express fears, feelings, frustrations, and discuss symptoms    - [place] patient to reality, help patient recognize reality-based thinking   - Administer medications as ordered and assess for potential side effects  - Provide the patient education related to the signs and symptoms of the illness and desired effects of prescribed medications  Outcome: Progressing     Problem: Anxiety  Goal: Anxiety is at manageable level  Description: Interventions:  - Assess and monitor patient's anxiety level  - Monitor for signs and symptoms (heart palpitations, chest pain, shortness of breath, headaches, nausea, feeling jumpy, restlessness, irritable, apprehensive)  - Collaborate with interdisciplinary team and initiate plan and interventions as ordered    - [place] patient to unit/surroundings  - Explain treatment plan  - Encourage participation in care  - Encourage verbalization of concerns/fears  - Identify coping mechanisms  - Assist in developing anxiety-reducing skills  - Administer/offer alternative therapies  - Limit or eliminate stimulants  Outcome: Progressing

## 2023-01-24 NOTE — PROGRESS NOTES
Progress Note - Behavioral Health   Johana Mercedes 58 y o  male MRN: [id number]  Unit/Bed#: [facility]-01 Encounter: [id number]    Assessment/Plan   Principal Problem:    Schizophrenia ([address] )  Active Problems:    Autistic disorder    BPH (benign prostatic hyperplasia)      Behavior over the last 24 hours:  unchanged  Sleep: normal  Appetite: normal  Medication side effects: No  ROS: no complaints and all other systems are negative    Subjective: Usha Perdomo was seen today for psychiatric follow-up  Patient calm cooperative  Continues to be isolative to self withdrawn to room  Occasionally visible on the unit  Laughs inappropriately  He denies SI/HI/AVH, he does not appear to be responding to internal stimuli  Mental Status Evaluation:  Appearance:  age appropriate and casually dressed   Behavior:  cooperative   Speech:  normal pitch and normal volume   Mood:  euthymic   Affect:  mood-congruent   Thought Process:  circumstantial   Associations: circumstantial associations   Thought Content:  preoccupied with discharge   Perceptual Disturbances: denies AVH, does not appear internally preoccupied   Risk Potential: Suicidal Ideations none  Homicidal Ideations none  Potential for Aggression No   Sensorium:  person and place   Memory:  recent and remote memory grossly intact   Consciousness:  alert and awake    Attention: attention span and concentration were age appropriate   Insight:  limited   Judgment: limited   Gait/Station: normal gait/station   Motor Activity: no abnormal movements     Progress Toward Goals: Patient continues to laugh inappropriate on occasion  He is withdrawn to room  Compliant with current psychotropic medication regimen  Denies side effect from current psychotropic medication regimen  Will continue current psychotropic medication regimen  No discharge date yet  Recommended Treatment: Continue with group therapy, milieu therapy and occupational therapy        Risks, benefits and possible side effects of Medications:   Risks, benefits, and possible side effects of medications explained to patient and patient verbalizes understanding  Medications:   all current active meds have been reviewed and current meds:   Current Facility-Administered Medications   Medication Dose Route Frequency   • acetaminophen (TYLENOL) tablet 650 mg  650 mg Oral Q4H PRN   • acetaminophen (TYLENOL) tablet 650 mg  650 mg Oral Q4H PRN   • acetaminophen (TYLENOL) tablet 975 mg  975 mg Oral Q6H PRN   • benztropine (COGENTIN) tablet 0 5 mg  0 5 mg Oral Q4H PRN Max 6/day   • cholecalciferol (VITAMIN D3) tablet 1,000 Units  1,000 Units Oral Daily   • divalproex sodium (DEPAKOTE ER) 24 hr tablet 500 mg  500 mg Oral BID   • docusate sodium (COLACE) capsule 100 mg  100 mg Oral BID   • glycerin-hypromellose- (ARTIFICIAL TEARS) ophthalmic solution 1 drop  1 drop Both Eyes BID   • hydrOXYzine HCL (ATARAX) tablet 25 mg  25 mg Oral Q6H PRN Max 4/day   • LORazepam (ATIVAN) injection 1 mg  1 mg Intramuscular Q6H PRN Max 3/day   • LORazepam (ATIVAN) tablet 0 5 mg  0 5 mg Oral Q6H PRN Max 4/day   • LORazepam (ATIVAN) tablet 1 mg  1 mg Oral Q6H PRN Max 3/day   • magnesium hydroxide (MILK OF MAGNESIA) oral suspension 30 mL  30 mL Oral Daily PRN   • melatonin tablet 3 mg  3 mg Oral HS PRN   • OLANZapine (ZyPREXA) IM injection 5 mg  5 mg Intramuscular Q3H PRN Max 3/day   • OLANZapine (ZyPREXA) tablet 2 5 mg  2 5 mg Oral Q4H PRN Max 6/day   • OLANZapine (ZyPREXA) tablet 5 mg  5 mg Oral Q4H PRN Max 3/day   • OLANZapine (ZyPREXA) tablet 5 mg  5 mg Oral Q3H PRN Max 3/day   • pantoprazole (PROTONIX) EC tablet 40 mg  40 mg Oral Early Morning   • polyethylene glycol (MIRALAX) packet 17 g  17 g Oral Daily PRN   • risperiDONE (RisperDAL) tablet 2 mg  2 mg Oral HS   • tamsulosin (FLOMAX) capsule 0 4 mg  0 4 mg Oral Daily With Dinner     Labs: I have personally reviewed all pertinent laboratory/tests results     Most Recent Labs:   Lab Results   Component Value Date    WBC 4 32 01/12/2023    RBC 4 29 01/12/2023    HGB 15 2 01/12/2023    HCT 43 9 01/12/2023     01/12/2023    RDW 13 2 01/12/2023    NEUTROABS 1 92 01/12/2023    SODIUM 138 01/12/2023    K 3 5 01/12/2023     01/12/2023    CO2 27 01/12/2023    BUN 3 (L) 01/12/2023    CREATININE 0 83 01/12/2023    GLUC 110 01/12/2023    GLUF 89 12/07/2020    CALCIUM 8 9 01/12/2023    AST 21 01/12/2023    ALT 16 01/12/2023    ALKPHOS 67 01/12/2023    TP 6 7 01/12/2023    ALB 4 1 01/12/2023    TBILI 0 61 01/12/2023    VALPROICTOT 85 01/23/2023    [id number] 1 840 01/12/2023    HGBA1C 6 1 (H) 01/02/2023     01/02/2023       Counseling / Coordination of Care  Total floor / unit time spent today 25 minutes

## 2023-01-24 NOTE — PROGRESS NOTES
01/24/23    Team Meeting   Meeting Type Daily Rounds   Team Members Present   Team Members Present Physician;Nurse;;Occupational Therapist   Physician Team Member Dr Rosaline Hammer MD; RK King   Nursing Team Member [place], [facility]; Lilo Yang RN   Care Management Team Member MS Dheeraj, [facility]   OT Team Member Julian Crea, [place]   Patient/Family Present   Patient Present No   Patient's Family Present No   Denies all, medication compliant, agrees to take medications when he returns home, d/c end of week, cm to have [facility] act come out to assess pt in person  PT will return home with act services  Pleasant and cooperative

## 2023-01-25 LAB
GLUCOSE SERPL-MCNC: 141 MG/DL (ref 65–140)
GLUCOSE SERPL-MCNC: 95 MG/DL (ref 65–140)

## 2023-01-25 RX ADMIN — DOCUSATE SODIUM 100 MG: 100 CAPSULE, LIQUID FILLED ORAL at 17:53

## 2023-01-25 RX ADMIN — GLYCERIN, HYPROMELLOSE, POLYETHYLENE GLYCOL 1 DROP: .2; .2; 1 LIQUID OPHTHALMIC at 17:53

## 2023-01-25 RX ADMIN — GLYCERIN, HYPROMELLOSE, POLYETHYLENE GLYCOL 1 DROP: .2; .2; 1 LIQUID OPHTHALMIC at 08:58

## 2023-01-25 RX ADMIN — Medication 1000 UNITS: at 08:56

## 2023-01-25 RX ADMIN — TAMSULOSIN HYDROCHLORIDE 0.4 MG: 0.4 CAPSULE ORAL at 17:53

## 2023-01-25 RX ADMIN — DOCUSATE SODIUM 100 MG: 100 CAPSULE, LIQUID FILLED ORAL at 08:56

## 2023-01-25 RX ADMIN — DIVALPROEX SODIUM 500 MG: 500 TABLET, EXTENDED RELEASE ORAL at 08:56

## 2023-01-25 RX ADMIN — PANTOPRAZOLE SODIUM 40 MG: 40 TABLET, DELAYED RELEASE ORAL at 05:14

## 2023-01-25 RX ADMIN — DIVALPROEX SODIUM 500 MG: 500 TABLET, EXTENDED RELEASE ORAL at 21:05

## 2023-01-25 RX ADMIN — RISPERIDONE 2 MG: 2 TABLET ORAL at 21:05

## 2023-01-25 NOTE — PROGRESS NOTES
01/25/23 1300   Activity/Group Checklist   Group   (Round Table Open Discussion Art Therapy Processing)   Attendance Did not attend  (AT group offered, PT elected to remain in room)

## 2023-01-25 NOTE — PLAN OF CARE
Problem: Alteration in Thoughts and Perception  Goal: Verbalize thoughts and feelings  Description: Interventions:  - Promote a nonjudgmental and trusting relationship with the patient through active listening and therapeutic communication  - Assess patient's level of functioning, behavior and potential for risk  - Engage patient in 1 on 1 interactions  - Encourage patient to express fears, feelings, frustrations, and discuss symptoms    - [place] patient to reality, help patient recognize reality-based thinking   - Administer medications as ordered and assess for potential side effects  - Provide the patient education related to the signs and symptoms of the illness and desired effects of prescribed medications  Outcome: Progressing     Problem: Anxiety  Goal: Anxiety is at manageable level  Description: Interventions:  - Assess and monitor patient's anxiety level  - Monitor for signs and symptoms (heart palpitations, chest pain, shortness of breath, headaches, nausea, feeling jumpy, restlessness, irritable, apprehensive)  - Collaborate with interdisciplinary team and initiate plan and interventions as ordered    - [place] patient to unit/surroundings  - Explain treatment plan  - Encourage participation in care  - Encourage verbalization of concerns/fears  - Identify coping mechanisms  - Assist in developing anxiety-reducing skills  - Administer/offer alternative therapies  - Limit or eliminate stimulants  Outcome: Progressing

## 2023-01-25 NOTE — PROGRESS NOTES
01/25/23 1174   Activity/Group Checklist   Group Community meeting   Attendance Did not attend  (Pt offered grp; pt remained in his bed sleeping)

## 2023-01-25 NOTE — PROGRESS NOTES
Progress Note - Gavino Nielsen 58 y o  male MRN: [id number]    [facility]Bed#: [facility]01 Encounter: [id number]        Subjective:   Patient seen and examined at bedside after reviewing the chart and discussing the case with the caring staff  Patient examined at bedside  Patient reports no acute symptoms  Patient is a possible discharge on Friday, 1/27/2023  Physical Exam   Vitals: Blood pressure 113/57, pulse 56, temperature 98 1 °F (36 7 °C), temperature source Temporal, resp  rate 17, height 5' 11" (1 803 m), weight 70 5 kg (155 lb 6 4 oz), SpO2 98 %  ,Body mass index is 21 67 kg/m²  Constitutional: Patient in no acute distress  HEENT: PERR, EOMI, MMM  Cardiovascular: Normal rate and regular rhythm  Pulmonary/Chest: Effort normal and breath sounds normal    Abdomen: Soft, + BS, NT, no rebound, no guarding  Assessment/Plan:    Gavino Nielsen is a(n) 58 y o  male with schizophrenia      1  Type 2 diabetes mellitus  Diet controlled  Patient not on diabetic medication  Hgb a1c 6 1% on 1/2/2023  Carb controlled diet  Accu-Cheks twice daily  2  BPH  Continue Flomax 0 4 mg daily  3  Chronic bronchitis  Stable  Patient may use albuterol inhaler as needed  4  DJD/OA  Patient may take Tylenol as needed  5  Constipation  Patient started on Colace 100 mg twice daily, Miralax/MOM as needed  6  Vitamin D deficiency  Patient started on vitamin D3 1000 units daily  7  Tinnitus  Recommend f/u outpatient ENT  8   GERD  I will put the patient on Protonix 40 mg daily along with Maalox as needed

## 2023-01-25 NOTE — PROGRESS NOTES
Progress Note - Behavioral Health   Nancy Harrington 58 y o  male MRN: [id number]  Unit/Bed#: [facility] 203-01 Encounter: [id number]    Assessment/Plan   Principal Problem:    Schizophrenia ([address] )  Active Problems:    Autistic disorder    BPH (benign prostatic hyperplasia)      Behavior over the last 24 hours: improvng  Sleep: normal  Appetite: normal  Medication side effects: No  ROS: no complaints and all other systems are negative    Subjective: Henry Dora was seen today for psychiatric follow-up  Patient calm cooperative  Withdrawn to room  He continues to have periods of  inappropriate laughter  He denies SI/HI/AVH, he does not appear to be internally preoccupied  Mental Status Evaluation:  Appearance:  age appropriate and casually dressed   Behavior:  calm, cooperative   Speech:  normal pitch and normal volume   Mood:  euthymic   Affect:  mood-congruent   Thought Process:  circumstantial   Associations: circumstantial associations   Thought Content:  preoccupied   Perceptual Disturbances: denies AVH, does not appear internally preoccupied   Risk Potential: Suicidal Ideations none  Homicidal Ideations none  Potential for Aggression No   Sensorium:  person and place   Memory:  recent and remote memory grossly intact   Consciousness:  alert and awake    Attention: attention span and concentration were age appropriate   Insight:  limited   Judgment: limited   Gait/Station: normal gait/station   Motor Activity: no abnormal movements     Progress Toward Goals: Patient continues to have periods of inappropriate laughter in conversation  He is compliant with current psychotropic medication regimen  Denies side effects from current psychotropic medication regimen  Will continue current psychotropic medication regimen  Patient asked team to see him tomorrow, with anticipated discharge on 1/27/2023  Recommended Treatment: Continue with group therapy, milieu therapy and occupational therapy        Risks, benefits and possible side effects of Medications:   Risks, benefits, and possible side effects of medications explained to patient and patient verbalizes understanding  Medications:   all current active meds have been reviewed and current meds:   Current Facility-Administered Medications   Medication Dose Route Frequency   • acetaminophen (TYLENOL) tablet 650 mg  650 mg Oral Q4H PRN   • acetaminophen (TYLENOL) tablet 650 mg  650 mg Oral Q4H PRN   • acetaminophen (TYLENOL) tablet 975 mg  975 mg Oral Q6H PRN   • benztropine (COGENTIN) tablet 0 5 mg  0 5 mg Oral Q4H PRN Max 6/day   • cholecalciferol (VITAMIN D3) tablet 1,000 Units  1,000 Units Oral Daily   • divalproex sodium (DEPAKOTE ER) 24 hr tablet 500 mg  500 mg Oral BID   • docusate sodium (COLACE) capsule 100 mg  100 mg Oral BID   • glycerin-hypromellose- (ARTIFICIAL TEARS) ophthalmic solution 1 drop  1 drop Both Eyes BID   • hydrOXYzine HCL (ATARAX) tablet 25 mg  25 mg Oral Q6H PRN Max 4/day   • LORazepam (ATIVAN) injection 1 mg  1 mg Intramuscular Q6H PRN Max 3/day   • LORazepam (ATIVAN) tablet 0 5 mg  0 5 mg Oral Q6H PRN Max 4/day   • LORazepam (ATIVAN) tablet 1 mg  1 mg Oral Q6H PRN Max 3/day   • magnesium hydroxide (MILK OF MAGNESIA) oral suspension 30 mL  30 mL Oral Daily PRN   • melatonin tablet 3 mg  3 mg Oral HS PRN   • OLANZapine (ZyPREXA) IM injection 5 mg  5 mg Intramuscular Q3H PRN Max 3/day   • OLANZapine (ZyPREXA) tablet 2 5 mg  2 5 mg Oral Q4H PRN Max 6/day   • OLANZapine (ZyPREXA) tablet 5 mg  5 mg Oral Q4H PRN Max 3/day   • OLANZapine (ZyPREXA) tablet 5 mg  5 mg Oral Q3H PRN Max 3/day   • pantoprazole (PROTONIX) EC tablet 40 mg  40 mg Oral Early Morning   • polyethylene glycol (MIRALAX) packet 17 g  17 g Oral Daily PRN   • risperiDONE (RisperDAL) tablet 2 mg  2 mg Oral HS   • tamsulosin (FLOMAX) capsule 0 4 mg  0 4 mg Oral Daily With Dinner     Labs: I have personally reviewed all pertinent laboratory/tests results     Most Recent Labs:   Lab Results   Component Value Date    WBC 4 32 01/12/2023    RBC 4 29 01/12/2023    HGB 15 2 01/12/2023    HCT 43 9 01/12/2023     01/12/2023    RDW 13 2 01/12/2023    NEUTROABS 1 92 01/12/2023    SODIUM 138 01/12/2023    K 3 5 01/12/2023     01/12/2023    CO2 27 01/12/2023    BUN 3 (L) 01/12/2023    CREATININE 0 83 01/12/2023    GLUC 110 01/12/2023    GLUF 89 12/07/2020    CALCIUM 8 9 01/12/2023    AST 21 01/12/2023    ALT 16 01/12/2023    ALKPHOS 67 01/12/2023    TP 6 7 01/12/2023    ALB 4 1 01/12/2023    TBILI 0 61 01/12/2023    VALPROICTOT 85 01/23/2023    [id number] 1 840 01/12/2023    HGBA1C 6 1 (H) 01/02/2023     01/02/2023       Counseling / Coordination of Care  Total floor / unit time spent today 25 minutes

## 2023-01-25 NOTE — NURSING NOTE
Patient was visible on the unit this evening  Patient denied all psych symptoms  Patient was pleasant cooperative, and med compliant  Inappropriate laughing at times  Will CTM  Continuous patient safety checks ongoing

## 2023-01-26 LAB
GLUCOSE SERPL-MCNC: 103 MG/DL (ref 65–140)
GLUCOSE SERPL-MCNC: 80 MG/DL (ref 65–140)

## 2023-01-26 RX ORDER — MELATONIN
1000 DAILY
Qty: 30 TABLET | Refills: 0 | Status: SHIPPED | OUTPATIENT
Start: 2023-01-27

## 2023-01-26 RX ORDER — RISPERIDONE 2 MG/1
2 TABLET ORAL
Qty: 30 TABLET | Refills: 0 | Status: SHIPPED | OUTPATIENT
Start: 2023-01-26

## 2023-01-26 RX ORDER — DIVALPROEX SODIUM 500 MG/1
500 TABLET, EXTENDED RELEASE ORAL 2 TIMES DAILY
Qty: 60 TABLET | Refills: 0 | Status: SHIPPED | OUTPATIENT
Start: 2023-01-26

## 2023-01-26 RX ORDER — TAMSULOSIN HYDROCHLORIDE 0.4 MG/1
0.4 CAPSULE ORAL
Qty: 30 CAPSULE | Refills: 0 | Status: SHIPPED | OUTPATIENT
Start: 2023-01-26 | End: 2024-01-26

## 2023-01-26 RX ORDER — CYCLOSPORINE 0.5 MG/ML
1 EMULSION OPHTHALMIC EVERY 12 HOURS
Qty: 5.5 ML | Refills: 0 | Status: SHIPPED | OUTPATIENT
Start: 2023-01-26

## 2023-01-26 RX ORDER — DOCUSATE SODIUM 100 MG/1
100 CAPSULE, LIQUID FILLED ORAL 2 TIMES DAILY
Qty: 60 CAPSULE | Refills: 0 | Status: SHIPPED | OUTPATIENT
Start: 2023-01-27

## 2023-01-26 RX ORDER — PANTOPRAZOLE SODIUM 40 MG/1
40 TABLET, DELAYED RELEASE ORAL
Qty: 30 TABLET | Refills: 0 | Status: SHIPPED | OUTPATIENT
Start: 2023-01-27

## 2023-01-26 RX ADMIN — DOCUSATE SODIUM 100 MG: 100 CAPSULE, LIQUID FILLED ORAL at 17:13

## 2023-01-26 RX ADMIN — RISPERIDONE 2 MG: 2 TABLET ORAL at 21:28

## 2023-01-26 RX ADMIN — GLYCERIN, HYPROMELLOSE, POLYETHYLENE GLYCOL 1 DROP: .2; .2; 1 LIQUID OPHTHALMIC at 08:11

## 2023-01-26 RX ADMIN — Medication 1000 UNITS: at 08:11

## 2023-01-26 RX ADMIN — DIVALPROEX SODIUM 500 MG: 500 TABLET, EXTENDED RELEASE ORAL at 21:28

## 2023-01-26 RX ADMIN — TAMSULOSIN HYDROCHLORIDE 0.4 MG: 0.4 CAPSULE ORAL at 17:13

## 2023-01-26 RX ADMIN — DOCUSATE SODIUM 100 MG: 100 CAPSULE, LIQUID FILLED ORAL at 08:10

## 2023-01-26 RX ADMIN — PANTOPRAZOLE SODIUM 40 MG: 40 TABLET, DELAYED RELEASE ORAL at 05:39

## 2023-01-26 RX ADMIN — GLYCERIN, HYPROMELLOSE, POLYETHYLENE GLYCOL 1 DROP: .2; .2; 1 LIQUID OPHTHALMIC at 17:14

## 2023-01-26 RX ADMIN — DIVALPROEX SODIUM 500 MG: 500 TABLET, EXTENDED RELEASE ORAL at 08:10

## 2023-01-26 NOTE — DISCHARGE INSTR - OTHER ORDERS
You are being discharged to your home located at [address]   [address] [address], Phone: [phone]  Triggers you have identified during your hospitalization that led to your admission distressed mood, include regression in mental health  Coping skills you have identified during your hospitalization include walking, talking, tv, music, radio  If you are unable to deal with your distressed mood alone please contact your primary care provider Dr Leslie Batista DO at  or your [facility] ACT team at [phone]  If that is not effective and you continue to have (ex: suicidal ideation, homicidal ideation, distressed mood, overwhelmed, in crisis) please contact (Crisis #) [phone]  [place]: [phone], dial 911 or go to the nearest emergency center  [facility] Crisis Hotline: 167.714.9073  *[place] Drug and Alcohol Commission: [place] Alcohol Anonymous: 351.539.9529  *National Suicide Prevention Lifeline:  8-403.747.6797  [facility] on Mental Illness ([place]) HELPLINE: 562.222.1898/Website: www harpal org  *Substance Abuse and Mental Health Services Administration(SAMHSA) [employer], which is a confidential, free, 24-hour-a-day, 365-day-a-year, information service for individuals and family members facing mental health and/or substance use disorders  This service provides referrals to local treatment facilities, support groups, and community-based organizations  Callers can also order free publications and other information  Call 8-719.280.4191/Website: www [facility] gov  *[facility] 2-1-1: This is a toll free, confidential, 24-hour-a-day service which connects you to a community  in your area who can help you find services and resources that are available to you locally and provide critical services that can improve and save lives    Call: Cesar Rodriguez: https://jennifer-rena kapoor/       Chalo Pack or dami Bergeron, will be calling you after your discharge, on the phone number that you provided  They will be available as an additional support, if needed  If you wish to speak with one of them, you may contact Unique Fall at [phone] or Christa Tapia at [phone]

## 2023-01-26 NOTE — SOCIAL WORK
KAYLEN placed call to PT brother Michel Waddellars to notify of PT scheduled discharge for tomorrow  [phone]  Phone disconnects  Placed call to alt number [phone]  Updated him on PT status and plan of care,a long with discharge palnning  Michel Barrios in agreement with all  He would rather pick PT up then medical transport  CM will cnx transport  PT brother will  PT up at 430pm tomorrow  Call ended mutually  CM placed call to [facility] with [facility] [place] AT THE [facility] ACT [phone]  Left message with information of updated transport arrangements  Requested return call with any questions

## 2023-01-26 NOTE — PLAN OF CARE
Problem: DISCHARGE PLANNING - CARE MANAGEMENT  Goal: Discharge to post-acute care or home with appropriate resources  Description: INTERVENTIONS:  - Conduct assessment to determine patient/family and health care team treatment goals, and need for post-acute services based on payer coverage, community resources, and patient preferences, and barriers to discharge  - Address psychosocial, clinical, and financial barriers to discharge as identified in assessment in conjunction with the patient/family and health care team  - Arrange appropriate level of post-acute services according to patient’s   needs and preference and payer coverage in collaboration with the physician and health care team  - Communicate with and update the patient/family, physician, and health care team regarding progress on the discharge plan  - Arrange appropriate transportation to post-acute venues  Outcome: Completed   D/C to home will follow up with PCP and also [facility] ACT team

## 2023-01-26 NOTE — PROGRESS NOTES
Progress Note - Stephen Christopher 58 y o  male MRN: [id number]    Unit/Bed#: Ramesh Lund 203-01 Encounter: [id number]        Subjective:   Patient seen and examined at bedside after reviewing the chart and discussing the case with the caring staff  Patient examined at bedside  Patient reports no acute symptoms  Patient is a possible discharge tomorrow, Friday 1/27/2023  Physical Exam   Vitals: Blood pressure 98/56, pulse 58, temperature 98 2 °F (36 8 °C), temperature source Temporal, resp  rate 14, height 5' 11" (1 803 m), weight 70 5 kg (155 lb 6 4 oz), SpO2 97 %  ,Body mass index is 21 67 kg/m²  Constitutional: Patient in no acute distress  HEENT: PERR, EOMI, MMM  Cardiovascular: Normal rate and regular rhythm  Pulmonary/Chest: Effort normal and breath sounds normal    Abdomen: Soft, + BS, NT, no rebound, no guarding  Assessment/Plan:    Stephen Christopher is a(n) 58 y o  male with schizophrenia      1  Type 2 diabetes mellitus  Diet controlled  Patient not on diabetic medication  Hgb a1c 6 1% on 1/2/2023  Carb controlled diet  Accu-Cheks twice daily  2  BPH  Continue Flomax 0 4 mg daily  3  Chronic bronchitis  Stable  Patient may use albuterol inhaler as needed  4  DJD/OA  Patient may take Tylenol as needed  5  Constipation  Patient started on Colace 100 mg twice daily, Miralax/MOM as needed  6  Vitamin D deficiency  Patient started on vitamin D3 1000 units daily  7  Tinnitus  Recommend f/u outpatient ENT  8   GERD  Patient Protonix 40 mg daily along with Maalox as needed  The patient was discussed with Dr Angélica Langford and he is in agreement with the above note

## 2023-01-26 NOTE — PLAN OF CARE
Problem: SAFETY ADULT  Goal: Patient will remain free of falls  Description: INTERVENTIONS:  - Educate patient/family on patient safety including physical limitations  - Instruct patient to call for assistance with activity   - Consult OT/PT to assist with strengthening/mobility   - Keep Call bell within reach  - Keep bed low and locked with side rails adjusted as appropriate  - Keep care items and personal belongings within reach  - Initiate and maintain comfort rounds  - Make Fall Risk Sign visible to staff  - Offer Toileting every 2 Hours, in advance of need  - Initiate/Maintain bed alarm  - Obtain necessary fall risk management equipment:   - Apply yellow socks and bracelet for high fall risk patients  - Consider moving patient to room near nurses station  Outcome: Progressing     Problem: Alteration in Thoughts and Perception  Goal: Verbalize thoughts and feelings  Description: Interventions:  - Promote a nonjudgmental and trusting relationship with the patient through active listening and therapeutic communication  - Assess patient's level of functioning, behavior and potential for risk  - Engage patient in 1 on 1 interactions  - Encourage patient to express fears, feelings, frustrations, and discuss symptoms    - [place] patient to reality, help patient recognize reality-based thinking   - Administer medications as ordered and assess for potential side effects  - Provide the patient education related to the signs and symptoms of the illness and desired effects of prescribed medications  Outcome: Progressing     Problem: Anxiety  Goal: Anxiety is at manageable level  Description: Interventions:  - Assess and monitor patient's anxiety level  - Monitor for signs and symptoms (heart palpitations, chest pain, shortness of breath, headaches, nausea, feeling jumpy, restlessness, irritable, apprehensive)  - Collaborate with interdisciplinary team and initiate plan and interventions as ordered    - [place] patient to unit/surroundings  - Explain treatment plan  - Encourage participation in care  - Encourage verbalization of concerns/fears  - Identify coping mechanisms  - Assist in developing anxiety-reducing skills  - Administer/offer alternative therapies  - Limit or eliminate stimulants  Outcome: Progressing     Problem: Ineffective Coping  Goal: Identifies healthy coping skills  Outcome: Completed

## 2023-01-26 NOTE — PROGRESS NOTES
01/26/23 0730   Activity/Group Checklist   Group Community meeting   Attendance Attended   Attendance Duration (min) 46-60   Interactions Interacted appropriately   Affect/Mood Appropriate   Goals Achieved Identified feelings; Able to listen to others; Able to engage in interactions; Able to self-disclose; Able to recieve feedback

## 2023-01-26 NOTE — PROGRESS NOTES
01/26/23 1300   Activity/Group Checklist   Group   (Therapy Dog Session with Sherice Grijalva)   Attendance Attended   Attendance Duration (min) 16-30   Interactions Interacted appropriately   Affect/Mood Appropriate;Bright   Goals Achieved Able to engage in interactions; Able to listen to others

## 2023-01-26 NOTE — BH TRANSITION RECORD
Contact Information: If you have any questions, concerns, pended studies, tests and/or procedures, or emergencies regarding your inpatient behavioral health visit  Please contact [facility] [phone] and ask to speak to a , nurse or physician  A contact is available 24 hours/ 7 days a week at this number  Summary of Procedures Performed During your Stay:  Below is a list of major procedures performed during your hospital stay and a summary of results:  - No major procedures performed  Pending Studies (From admission, onward)    None        Please follow up on the above pending studies with your PCP and/or referring provider

## 2023-01-26 NOTE — NURSING NOTE
Patient visible in milieu, pleasant and cooperative in interaction  Social with staff, minimal interaction with peers  Patient denies anxiety/depression, SI/HI  Endorses a "little" auditory hallucinations that are "mumbled"  Noted periods of inappropriate laughing to self, patient unable to identify what he is laughing at  Remains medication compliant and on 7" checks for safety and behaviors

## 2023-01-26 NOTE — PROGRESS NOTES
01/25/23 0930   Team Meeting   Meeting Type Daily Rounds   Team Members Present   Team Members Present Physician;Nurse;   Physician Team Member Dr Martha Sauceda MD; KR Cesar   Nursing Team Member Willy Falcon, FAREED; Vamsi Hart, FAREED   Care Management Team Member Julius Meza, [facility], [facility]   OT Team Member Yeyo Cohen [place]   Patient/Family Present   Patient Present No   Patient's Family Present No   Denies all, slept, lli to continue, d/c on Friday, LV act to meet with pt tomorrow

## 2023-01-26 NOTE — PROGRESS NOTES
01/26/23 0947   Team Meeting   Meeting Type Daily Rounds   Team Members Present   Team Members Present Physician;Nurse;;Occupational Therapist   Physician Team Member Dr Williams Kirby MD; RK Kapoor   Nursing Team Member Diana Treadwell, FAREED; Michele Limon, FAREED   Care Management Team Member MS Dheeraj, [facility]   OT Team Member Lucy Mccracken [place]   Patient/Family Present   Patient Present No   Patient's Family Present No   Denies si/hi/ah/vh, mild anxiety and depression, d/c home tomorrow and continue with lv act

## 2023-01-26 NOTE — PROGRESS NOTES
Progress Note - Behavioral Health   Dinesh Bingham 58 y o  male MRN: [id number]  Unit/Bed#: Tete Logan 203-01 Encounter: [id number]    Assessment/Plan   Principal Problem:    Schizophrenia ([address] )  Active Problems:    Autistic disorder    BPH (benign prostatic hyperplasia)      Behavior over the last 24 hours:  improving  Sleep: normal  Appetite: normal  Medication side effects: No  ROS: no complaints and all other systems are negative    Subjective: Ingrid Rios was seen today for psychiatric follow-up  Patient calm cooperative  Patient appears to have less episodes of inappropriate laughter  He is withdrawn to room  He denies SI/HI/AVH, he does not appear internally preoccupied  Mental Status Evaluation:  Appearance:  age appropriate and casually dressed   Behavior:  calm, cooperative   Speech:  normal pitch and normal volume   Mood:  euthymic   Affect:  normal   Thought Process:  goal directed   Associations: intact associations   Thought Content:  no overt delusions   Perceptual Disturbances: denies AVH, does not appear internally preoccupied   Risk Potential: Suicidal Ideations none  Homicidal Ideations none  Potential for Aggression No   Sensorium:  person, place and time/date   Memory:  recent and remote memory grossly intact   Consciousness:  alert and awake    Attention: attention span and concentration were age appropriate   Insight:  fair   Judgment: fair   Gait/Station: normal gait/station   Motor Activity: no abnormal movements     Progress Toward Goals: Patient appears to have less episodes of inappropriate laughter  He appears more stable on the unit  He is compliant with current psychotropic medication regimen  Denies side effects to current psychotropic medication regimen  Patient set for discharge on 12/27/2023  Recommended Treatment: Continue with group therapy, milieu therapy and occupational therapy        Risks, benefits and possible side effects of Medications:   Risks, benefits, and possible side effects of medications explained to patient and patient verbalizes understanding  Medications:   all current active meds have been reviewed and current meds:   Current Facility-Administered Medications   Medication Dose Route Frequency   • acetaminophen (TYLENOL) tablet 650 mg  650 mg Oral Q4H PRN   • acetaminophen (TYLENOL) tablet 650 mg  650 mg Oral Q4H PRN   • acetaminophen (TYLENOL) tablet 975 mg  975 mg Oral Q6H PRN   • benztropine (COGENTIN) tablet 0 5 mg  0 5 mg Oral Q4H PRN Max 6/day   • cholecalciferol (VITAMIN D3) tablet 1,000 Units  1,000 Units Oral Daily   • divalproex sodium (DEPAKOTE ER) 24 hr tablet 500 mg  500 mg Oral BID   • docusate sodium (COLACE) capsule 100 mg  100 mg Oral BID   • glycerin-hypromellose- (ARTIFICIAL TEARS) ophthalmic solution 1 drop  1 drop Both Eyes BID   • hydrOXYzine HCL (ATARAX) tablet 25 mg  25 mg Oral Q6H PRN Max 4/day   • LORazepam (ATIVAN) injection 1 mg  1 mg Intramuscular Q6H PRN Max 3/day   • LORazepam (ATIVAN) tablet 0 5 mg  0 5 mg Oral Q6H PRN Max 4/day   • LORazepam (ATIVAN) tablet 1 mg  1 mg Oral Q6H PRN Max 3/day   • magnesium hydroxide (MILK OF MAGNESIA) oral suspension 30 mL  30 mL Oral Daily PRN   • melatonin tablet 3 mg  3 mg Oral HS PRN   • OLANZapine (ZyPREXA) IM injection 5 mg  5 mg Intramuscular Q3H PRN Max 3/day   • OLANZapine (ZyPREXA) tablet 2 5 mg  2 5 mg Oral Q4H PRN Max 6/day   • OLANZapine (ZyPREXA) tablet 5 mg  5 mg Oral Q4H PRN Max 3/day   • OLANZapine (ZyPREXA) tablet 5 mg  5 mg Oral Q3H PRN Max 3/day   • pantoprazole (PROTONIX) EC tablet 40 mg  40 mg Oral Early Morning   • polyethylene glycol (MIRALAX) packet 17 g  17 g Oral Daily PRN   • risperiDONE (RisperDAL) tablet 2 mg  2 mg Oral HS   • tamsulosin (FLOMAX) capsule 0 4 mg  0 4 mg Oral Daily With Dinner     Labs: I have personally reviewed all pertinent laboratory/tests results     Most Recent Labs:   Lab Results   Component Value Date    WBC 4 32 01/12/2023    RBC 4 29 01/12/2023    HGB 15 2 01/12/2023    HCT 43 9 01/12/2023     01/12/2023    RDW 13 2 01/12/2023    NEUTROABS 1 92 01/12/2023    SODIUM 138 01/12/2023    K 3 5 01/12/2023     01/12/2023    CO2 27 01/12/2023    BUN 3 (L) 01/12/2023    CREATININE 0 83 01/12/2023    GLUC 110 01/12/2023    GLUF 89 12/07/2020    CALCIUM 8 9 01/12/2023    AST 21 01/12/2023    ALT 16 01/12/2023    ALKPHOS 67 01/12/2023    TP 6 7 01/12/2023    ALB 4 1 01/12/2023    TBILI 0 61 01/12/2023    VALPROICTOT 85 01/23/2023    [id number] 1 840 01/12/2023    HGBA1C 6 1 (H) 01/02/2023     01/02/2023       Counseling / Coordination of Care  Total floor / unit time spent today 25 minutes

## 2023-01-26 NOTE — DISCHARGE SUMMARY
Discharge Summary - [address] 58 y o  male MRN: [id number]  Unit/Bed#: [facility] 234-24 Encounter: [id number]     Admission Date: 1/12/2023         Discharge Date: 1/27/23  Attending Psychiatrist: Uriel Sanchez MD    Reason for Admission/HPI: Major depressive disorder, single episode, unspecified [F32 9]  Schizophrenia [facility]) [F20 9]    Patient is a 58 y o  male  with a history of Schizophrenia who was admitted to the inpatient adult psychiatric unit on a voluntary 201 commitment basis due to depression, anxiety, unstable mood, paranoid ideation and inability to care for self  Patient apparently presented to ED via EMS after calling 911 and making statement that "he wanted to die because he was skin and bone and has no blood in him"  UDS was negative  EKG with no acute changes  On evaluation in the inpatient psychiatric unit Nancy Sampson presents as very limited historian but able to answer questions concretely  States he came to the hospital because "I have thought disorder"  Patient admits that he had not been eating or sleeping well and experiencing increased paranoia and auditory hallucination without command for the past several weeks  Denies any current suicidal ideation or homicidal ideation and is able to contract for safety  Denies any history of alcohol or illicit drug use  He is unable to name any of his psychiatric medication but states he has a brother James Stewart who is very supportive  According to record, patient has been followed by [facility] and his last [place] 234 mg IM was given some time this week  Patient agreed to be compliant with medication and treatment plan  Hospital Course: The patient was admitted to the inpatient psychiatric unit and started on every 7 minutes precautions  During the hospitalization the patient was attending individual therapy, group therapy, milieu therapy and occupational therapy      Psychiatric medications were titrated over the hospital stay  To address depressive symptoms and mood instability the patient was started on mood stabilizer Depakote and antipsychotic medication Risperdal  Medication doses were titrated during the hospital course  Prior to beginning of treatment medications risks and benefits and possible side effects including risk of liver impairment related to treatment with Depakote, risk of parkinsonian symptoms, Tardive Dyskinesia and metabolic syndrome related to treatment with antipsychotic medications and risk of cardiovascular events in elderly related to treatment with antipsychotic medications were reviewed with the patient  The patient verbalized understanding and agreement for treatment  Patient's symptoms improved gradually over the hospital course  At the end of treatment the patient was doing well  Mood was stable at the time of discharge  The patient denied suicidal ideation, intent or plan at the time of discharge and denied homicidal ideation, intent or plan at the time of discharge  There was no overt psychosis at the time of discharge  Sleep and appetite were improved  The patient was tolerating medications and was not reporting any significant side effects at the time of discharge  Since the patient was doing well at the end of the hospitalization, treatment team felt that the patient could be safely discharged to outpatient care  The outpatient follow up with family physician Dr Hong Dinh and ACT team was arranged by the unit  upon discharge      Mental Status at time of Discharge:     Appearance:  age appropriate and casually dressed   Behavior:  cooperative   Speech:  normal pitch and normal volume   Mood:  euthymic   Affect:  normal   Thought Process:  goal directed   Thought Content:  no overt delusions   Perceptual Disturbances: denies AVH, does not appear internally preocupied   Risk Potential: Suicidal Ideations none   Sensorium:  person, place and time/date   Cognition:  recent and remote memory grossly intact   Consciousness:  alert and awake    Attention: attention span and concentration were age appropriate   Insight:  fair   Judgment: fair   Gait/Station: normal gait/station   Motor Activity: no abnormal movements     Admission Diagnosis:Major depressive disorder, single episode, unspecified [F32 9]  Schizophrenia ([facility] ) [F20 9]    Discharge Diagnosis:   Principal Problem:    Schizophrenia ([place] [address] )  Active Problems:    Autistic disorder    BPH (benign prostatic hyperplasia)  Resolved Problems:    * No resolved hospital problems   *        Lab results:  Admission on 01/12/2023   Component Date Value   • POC Glucose 01/13/2023 97    • Folate 01/14/2023 18 9 (H)    • Vit D, 25-Hydroxy 01/14/2023 32 3    • Vitamin B-12 01/14/2023 585    • POC Glucose 01/14/2023 113    • POC Glucose 01/14/2023 118    • POC Glucose 01/15/2023 93    • POC Glucose 01/16/2023 104    • POC Glucose 01/16/2023 131    • Valproic Acid, Total 01/17/2023 75    • POC Glucose 01/17/2023 122    • POC Glucose 01/17/2023 134    • POC Glucose 01/18/2023 127    • POC Glucose 01/18/2023 148 (H)    • POC Glucose 01/19/2023 126    • POC Glucose 01/19/2023 175 (H)    • POC Glucose 01/20/2023 121    • POC Glucose 01/20/2023 174 (H)    • POC Glucose 01/21/2023 110    • POC Glucose 01/21/2023 184 (H)    • POC Glucose 01/22/2023 104    • POC Glucose 01/22/2023 182 (H)    • Valproic Acid, Total 01/23/2023 85    • POC Glucose 01/23/2023 100    • POC Glucose 01/23/2023 159 (H)    • POC Glucose 01/24/2023 123    • POC Glucose 01/24/2023 183 (H)    • POC Glucose 01/25/2023 95    • POC Glucose 01/25/2023 141 (H)    • POC Glucose 01/26/2023 80        Discharge Medications:  Current Discharge Medication List      START taking these medications    Details   divalproex sodium (DEPAKOTE ER) 500 mg 24 hr tablet Take 1 tablet (500 mg total) by mouth 2 (two) times a day  Qty: 60 tablet, Refills: 0    Associated Diagnoses: Disorganized schizophrenia [facility])            Current Discharge Medication List      STOP taking these medications       divalproex sodium (DEPAKOTE) 500 mg EC tablet Comments:   Reason for Stopping:              Current Discharge Medication List      CONTINUE these medications which have CHANGED    Details   risperiDONE (RisperDAL) 2 mg tablet Take 1 tablet (2 mg total) by mouth daily at bedtime  Qty: 30 tablet, Refills: 0    Associated Diagnoses: Disorganized schizophrenia ([address] )            Current Discharge Medication List      CONTINUE these medications which have NOT CHANGED    Details   cycloSPORINE (Restasis) 0 05 % ophthalmic emulsion Apply 1 drop to eye every 12 (twelve) hours      Invega Sustenna 234 MG/1 5ML IM injection Next dose is on 02/06/23      tamsulosin (FLOMAX) 0 4 mg Take 0 4 mg by mouth      atorvastatin (LIPITOR) 20 mg tablet       ibuprofen (MOTRIN) 600 mg tablet Take 1 tablet (600 mg total) by mouth 3 (three) times a day for 10 days  Qty: 30 tablet, Refills: 0    Associated Diagnoses: Pain, dental      ipratropium-albuterol (Combivent Respimat) inhaler Inhale 1 puff 4 (four) times a day as needed      meloxicam (MOBIC) 15 mg tablet       metFORMIN (GLUCOPHAGE) 500 mg tablet       OneTouch Ultra test strip       Tradjenta 5 MG TABS               Discharge instructions/Information to patient and family:   See after visit summary for information provided to patient and family  Provisions for Follow-Up Care:  See after visit summary for information related to follow-up care and any pertinent home health orders  Discharge Statement     I spent 45 minutes discharging the patient  This time was spent on the day of discharge  I had direct contact with the patient on the day of discharge  Additional documentation is required if more than 30 minutes were spent on discharge:    I reviewed with Eloise Lezama importance of compliance with medications and outpatient treatment after discharge    I discussed the medication regimen and possible side effects of the medications with Dai Hawkins prior to discharge  At the time of discharge he was tolerating psychiatric medications  I discussed outpatient follow up with Dai Hawkins  I reviewed with Dai Hawkins crisis plan and safety plan upon discharge  Outpatient Smoking Cessation referral was offered to Dai Hawkins   He declined the referral     Patient's next dose of Geralynn Skeeters is on 02/06/23

## 2023-01-26 NOTE — SOCIAL WORK
CM placed call to PCP Dr Deanne Riley, [facility], notified of PT scheduled  Discharge, PT is scheduled for follow up on 3pm on 1/31/23  Call ended mutually

## 2023-01-26 NOTE — SOCIAL WORK
[facility] the [facility] unit/room [facility] 203 bed [facility] 203-01, and will arrive on 01/27/2023 at 11:00am EST  Contact them at [phone]   Ride details here: [email or web address]  12:41 PM  20 days left

## 2023-01-26 NOTE — SOCIAL WORK
David with [facility] came to meet with PT and assess for his return home  David in agreement that PT is ready for return home  Processed with PT goals for his return home along with supports and tx plan and goals, PT in agreement  PT reviewed that he will be compliant with medications and notes necesitty in doing so  PT denies si/hi/ah/vh anxiety and depression  CM to schedule transport as act team is unable to transport tomorrow  Scripts to go to [employer] and the act team will deliver pt the bubble packs as they manage his medications  PT in agreement with this  Reviewed IMM, PT declined the right to appeal, feels he is ready for discharge tomorrow

## 2023-01-27 VITALS
SYSTOLIC BLOOD PRESSURE: 116 MMHG | TEMPERATURE: 97.8 F | BODY MASS INDEX: 21.76 KG/M2 | HEIGHT: 71 IN | HEART RATE: 60 BPM | OXYGEN SATURATION: 95 % | WEIGHT: 155.4 LBS | DIASTOLIC BLOOD PRESSURE: 78 MMHG | RESPIRATION RATE: 18 BRPM

## 2023-01-27 LAB — GLUCOSE SERPL-MCNC: 83 MG/DL (ref 65–140)

## 2023-01-27 RX ADMIN — Medication 1000 UNITS: at 08:34

## 2023-01-27 RX ADMIN — DOCUSATE SODIUM 100 MG: 100 CAPSULE, LIQUID FILLED ORAL at 08:33

## 2023-01-27 RX ADMIN — DIVALPROEX SODIUM 500 MG: 500 TABLET, EXTENDED RELEASE ORAL at 08:33

## 2023-01-27 RX ADMIN — TAMSULOSIN HYDROCHLORIDE 0.4 MG: 0.4 CAPSULE ORAL at 16:30

## 2023-01-27 RX ADMIN — GLYCERIN, HYPROMELLOSE, POLYETHYLENE GLYCOL 1 DROP: .2; .2; 1 LIQUID OPHTHALMIC at 08:33

## 2023-01-27 RX ADMIN — PANTOPRAZOLE SODIUM 40 MG: 40 TABLET, DELAYED RELEASE ORAL at 06:27

## 2023-01-27 NOTE — PROGRESS NOTES
01/27/23 1300   Activity/Group Checklist   Group   ([employer] Processing)   Attendance Did not attend

## 2023-01-27 NOTE — NURSING NOTE
Pt instructed on discharge instructions & medications; & verbalized understanding of instructions  Pt discharged home via ambulation via main entrance of hospital accompanied by BHT & belongings sent with pt  Pt transported home via car with his brother

## 2023-01-27 NOTE — NURSING NOTE
Pt up ad efrain & gait is steady  Pt is pleasant & cooperative  Pt is compliant with medications  Pt denies any depression or anxiety  Pt denies any hallucinations, suicidal or homicidal ideations  Q 7 min checks maintained to monitor pt's behavior & safety

## 2023-01-27 NOTE — PROGRESS NOTES
Progress Note - Sushil Russo 58 y o  male MRN: [id number]    Unit/Bed#: [facility] 203-01 Encounter: [id number]        Subjective:   Patient seen and examined at bedside after reviewing the chart and discussing the case with the caring staff  Patient examined at bedside  Patient reports no acute symptoms  Patient is being discharged today, Friday 1/27/2023  Physical Exam   Vitals: Blood pressure 99/59, pulse 56, temperature 97 5 °F (36 4 °C), temperature source Temporal, resp  rate 16, height 5' 11" (1 803 m), weight 70 5 kg (155 lb 6 4 oz), SpO2 97 %  ,Body mass index is 21 67 kg/m²  Constitutional: Patient in no acute distress  HEENT: PERR, EOMI, MMM  Cardiovascular: Normal rate and regular rhythm  Pulmonary/Chest: Effort normal and breath sounds normal    Abdomen: Soft, + BS, NT, no rebound, no guarding  Assessment/Plan:    Sushil Russo is a(n) 58 y o  male with schizophrenia  MEDICAL CLEARANCE: Patient is medically cleared for discharge      1  Type 2 diabetes mellitus  Diet controlled  Patient not on diabetic medication  Hgb a1c 6 1% on 1/2/2023  Carb controlled diet  Accu-Cheks twice daily  2  BPH  Continue Flomax 0 4 mg daily  3  Chronic bronchitis  Stable  Patient may use albuterol inhaler as needed  4  DJD/OA  Patient may take Tylenol as needed  5  Constipation  Patient started on Colace 100 mg twice daily, Miralax/MOM as needed  6  Vitamin D deficiency  Patient started on vitamin D3 1000 units daily  7  Tinnitus  Recommend f/u outpatient ENT  8  GERD  Patient Protonix 40 mg daily along with Maalox as needed  The patient was discussed with Dr Griselda Guidry and he is in agreement with the above note

## 2023-01-27 NOTE — NURSING NOTE
Patient was observed to be in the community this evening; spending time in the dining area  Speech is loud  He denies feeling anxious and/ or depressed  Denies SI, HI and hallucinations  Denies any pain  Patient was medication compliant at    Positive for snack and fluids tonight  He did have one episode of coughing while consuming snack; patient encouraged to take small bites, chew food well and take his time eating  Continuous safety rounding in progress

## 2023-01-27 NOTE — PROGRESS NOTES
01/27/23    Team Meeting   Meeting Type Daily Rounds   Team Members Present   Team Members Present Physician;Nurse;;Occupational Therapist   Physician Team Member Dr Ramona Pettit MD; RK Brown   Nursing Team Member Leonora Carver RN; Katerine Rivera RN   Care Management Team Member MS [facility], [facility]   OT Team Member Emanuel Oneill [place]   Patient/Family Present   Patient Present No   Patient's Family Present No   D/c today to home, brother will  this afternoon, will continue with [facility] act, also will follow up with pcp

## 2023-01-27 NOTE — PROGRESS NOTES
Pt discharged with list of belongings:    Eyeglasses  Blue long sleeve shirt  White sock  Blue jeans  Hankck  Black watch  Harriett Jez belt  [employer] black sneakers  Green jacket  Black wallet x3  Flip cell phone  Black   Car remote with key    Pt signed and agreed to belongings on list

## 2023-01-27 NOTE — PROGRESS NOTES
01/27/23 1256   Activity/Group Checklist   Group Community meeting   Attendance Did not attend  (Pt offered grp; pt remained in his room in bed resting)

## 2023-01-27 NOTE — PLAN OF CARE
Problem: Nutrition/Hydration-ADULT  Goal: Nutrient/Hydration intake appropriate for improving, restoring or maintaining nutritional needs  Description: Monitor and assess patient's nutrition/hydration status for malnutrition  Collaborate with interdisciplinary team and initiate plan and interventions as ordered  Monitor patient's weight and dietary intake as ordered or per policy  Utilize nutrition screening tool and intervene as necessary  Determine patient's food preferences and provide high-protein, high-caloric foods as appropriate       INTERVENTIONS:  - Monitor oral intake, urinary output, labs, and treatment plans  - Assess nutrition and hydration status and recommend course of action  - Evaluate amount of meals eaten  - Assist patient with eating if necessary   - Allow adequate time for meals  - Recommend/ encourage appropriate diets, oral nutritional supplements, and vitamin/mineral supplements  - Order, calculate, and assess calorie counts as needed  - Recommend, monitor, and adjust tube feedings and TPN/PPN based on assessed needs  - Assess need for intravenous fluids  - Provide specific nutrition/hydration education as appropriate  - Include patient/family/caregiver in decisions related to nutrition  Outcome: Progressing     Problem: SAFETY ADULT  Goal: Patient will remain free of falls  Description: INTERVENTIONS:  - Educate patient/family on patient safety including physical limitations  - Instruct patient to call for assistance with activity   - Consult OT/PT to assist with strengthening/mobility   - Keep Call bell within reach  - Keep bed low and locked with side rails adjusted as appropriate  - Keep care items and personal belongings within reach  - Initiate and maintain comfort rounds  - Make Fall Risk Sign visible to staff  - Apply yellow socks and bracelet for high fall risk patients  - Consider moving patient to room near nurses station  Outcome: Progressing  Goal: Maintain or return to baseline ADL function  Description: INTERVENTIONS:  -  Assess patient's ability to carry out ADLs; assess patient's baseline for ADL function and identify physical deficits which impact ability to perform ADLs (bathing, care of mouth/teeth, toileting, grooming, dressing, etc )  - Assess/evaluate cause of self-care deficits   - Assess range of motion  - Assess patient's mobility; develop plan if impaired  - Assess patient's need for assistive devices and provide as appropriate  - Encourage maximum independence but intervene and supervise when necessary  - Involve family in performance of ADLs  - Assess for home care needs following discharge   - Consider OT consult to assist with ADL evaluation and planning for discharge  - Provide patient education as appropriate  Outcome: Progressing  Goal: Maintains/Returns to pre admission functional level  Description: INTERVENTIONS:  - Perform BMAT or MOVE assessment daily    - Set and communicate daily mobility goal to care team and patient/family/caregiver     - Collaborate with rehabilitation services on mobility goals if consulted  - Out of bed for toileting  - Record patient progress and toleration of activity level   Outcome: Progressing     Problem: Alteration in Thoughts and Perception  Goal: Treatment Goal: Gain control of psychotic behaviors/thinking, reduce/eliminate presenting symptoms and demonstrate improved reality functioning upon discharge  Outcome: Progressing  Goal: Verbalize thoughts and feelings  Description: Interventions:  - Promote a nonjudgmental and trusting relationship with the patient through active listening and therapeutic communication  - Assess patient's level of functioning, behavior and potential for risk  - Engage patient in 1 on 1 interactions  - Encourage patient to express fears, feelings, frustrations, and discuss symptoms    - [place] patient to reality, help patient recognize reality-based thinking   - Administer medications as ordered and assess for potential side effects  - Provide the patient education related to the signs and symptoms of the illness and desired effects of prescribed medications  Outcome: Progressing  Goal: Refrain from acting on delusional thinking/internal stimuli  Description: Interventions:  - Monitor patient closely, per order   - Utilize least restrictive measures   - Set reasonable limits, give positive feedback for acceptable   - Administer medications as ordered and monitor of potential side effects  Outcome: Progressing  Goal: Agree to be compliant with medication regime, as prescribed and report medication side effects  Description: Interventions:  - Offer appropriate PRN medication and supervise ingestion; conduct AIMS, as needed   Outcome: Progressing  Goal: Attend and participate in unit activities, including therapeutic, recreational, and educational groups  Description: Interventions:  -Encourage Visitation and family involvement in care  Outcome: Progressing  Goal: Complete daily ADLs, including personal hygiene independently, as able  Description: Interventions:  - Observe, teach, and assist patient with ADLS  - Monitor and promote a balance of rest/activity, with adequate nutrition and elimination   Outcome: Progressing     Problem: Ineffective Coping  Goal: Demonstrates healthy coping skills  Outcome: Progressing     Problem: Anxiety  Goal: Anxiety is at manageable level  Description: Interventions:  - Assess and monitor patient's anxiety level  - Monitor for signs and symptoms (heart palpitations, chest pain, shortness of breath, headaches, nausea, feeling jumpy, restlessness, irritable, apprehensive)  - Collaborate with interdisciplinary team and initiate plan and interventions as ordered    - [place] patient to unit/surroundings  - Explain treatment plan  - Encourage participation in care  - Encourage verbalization of concerns/fears  - Identify coping mechanisms  - Assist in developing anxiety-reducing skills  - Administer/offer alternative therapies  - Limit or eliminate stimulants  Outcome: Progressing     Problem: Ineffective Coping  Goal: Participates in unit activities  Description: Interventions:  - Provide therapeutic environment   - Provide required programming   - Redirect inappropriate behaviors   Outcome: Progressing

## 2023-05-16 NOTE — PROGRESS NOTES
Progress Note - Marques Landin 58 y o  male MRN: [id number]    Unit/Bed#: [facility]-01 Encounter: [id number]        Subjective:   Patient seen and examined at bedside after reviewing the chart and discussing the case with the caring staff  Patient examined at bedside  Patient states he had a bowel movement yesterday  He denies any other complaints at this time  Physical Exam   Vitals: Blood pressure 107/57, pulse 55, temperature 98 9 °F (37 2 °C), temperature source Temporal, resp  rate 16, height 5' 11" (1 803 m), weight 68 7 kg (151 lb 6 4 oz), SpO2 94 %  ,Body mass index is 21 12 kg/m²  Constitutional: Patient in no acute distress  HEENT: PERR, EOMI, MMM  Cardiovascular: Normal rate and regular rhythm  Pulmonary/Chest: Effort normal and breath sounds normal    Abdomen: Soft, + BS, NT  Assessment/Plan:    Marques Landin is a(n) 58 y o  male with schizophrenia      1  Type 2 diabetes mellitus  Diet controlled  Patient not on diabetic medication  Hgb a1c 6 1% on 1/2/2023  Carb controlled diet  Accu-Cheks twice daily  2  BPH  Continue Flomax 0 4 mg daily  3  Chronic bronchitis  Stable  Patient may use albuterol inhaler as needed  4  DJD/OA  Patient may take Tylenol as needed  5  Constipation  Patient started on Colace 100 mg twice daily, Miralax/MOM as needed  6  Vitamin D deficiency  Patient started on vitamin D3 1000 units daily  The patient was discussed with Dr Wayne Piña and he is in agreement with the above note  No

## 2024-01-09 ENCOUNTER — VBI (OUTPATIENT)
Dept: ADMINISTRATIVE | Facility: OTHER | Age: 64
End: 2024-01-09

## 2024-03-25 ENCOUNTER — HOSPITAL ENCOUNTER (OUTPATIENT)
Facility: HOSPITAL | Age: 64
Setting detail: OBSERVATION
Discharge: HOME | End: 2024-03-26
Attending: EMERGENCY MEDICINE | Admitting: INTERNAL MEDICINE
Payer: COMMERCIAL

## 2024-03-25 ENCOUNTER — APPOINTMENT (EMERGENCY)
Dept: CT IMAGING | Facility: HOSPITAL | Age: 64
End: 2024-03-25
Payer: COMMERCIAL

## 2024-03-25 DIAGNOSIS — R79.89 ELEVATED TROPONIN: ICD-10-CM

## 2024-03-25 DIAGNOSIS — R07.9 CHEST PAIN: Primary | ICD-10-CM

## 2024-03-25 DIAGNOSIS — E87.1 HYPONATREMIA: ICD-10-CM
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
## 2024-03-25 PROBLEM — E04.1 THYROID NODULE: Status: ACTIVE | Noted: 2024-03-25

## 2024-03-25 PROBLEM — E11.9 TYPE 2 DIABETES MELLITUS (HCC): Status: ACTIVE | Noted: 2024-03-25

## 2024-03-25 LAB
2HR DELTA HS TROPONIN: 5 NG/L
4HR DELTA HS TROPONIN: 4 NG/L
ALBUMIN SERPL BCP-MCNC: 4.8 G/DL (ref 3.5–5)
ALP SERPL-CCNC: 92 U/L (ref 34–104)
ALT SERPL W P-5'-P-CCNC: 13 U/L (ref 7–52)
ANION GAP SERPL CALCULATED.3IONS-SCNC: 6 MMOL/L (ref 4–13)
AST SERPL W P-5'-P-CCNC: 29 U/L (ref 13–39)
BASOPHILS # BLD AUTO: 0.02 THOUSANDS/ÂΜL (ref 0–0.1)
BASOPHILS NFR BLD AUTO: 0 % (ref 0–1)
BILIRUB SERPL-MCNC: 0.35 MG/DL (ref 0.2–1)
BUN SERPL-MCNC: 7 MG/DL (ref 5–25)
CALCIUM SERPL-MCNC: 10.1 MG/DL (ref 8.4–10.2)
CARDIAC TROPONIN I PNL SERPL HS: 15 NG/L
CARDIAC TROPONIN I PNL SERPL HS: 19 NG/L
CARDIAC TROPONIN I PNL SERPL HS: 20 NG/L
CHLORIDE SERPL-SCNC: 91 MMOL/L (ref 96–108)
CO2 SERPL-SCNC: 31 MMOL/L (ref 21–32)
CREAT SERPL-MCNC: 0.85 MG/DL (ref 0.6–1.3)
EOSINOPHIL # BLD AUTO: 0.08 THOUSAND/ÂΜL (ref 0–0.61)
EOSINOPHIL NFR BLD AUTO: 1 % (ref 0–6)
ERYTHROCYTE [DISTWIDTH] IN BLOOD BY AUTOMATED COUNT: 13.3 % (ref 11.6–15.1)
GFR SERPL CREATININE-BSD FRML MDRD: 92 ML/MIN/1.73SQ M
GLUCOSE SERPL-MCNC: 139 MG/DL (ref 65–140)
HCT VFR BLD AUTO: 44.5 % (ref 36.5–49.3)
HGB BLD-MCNC: 15.2 G/DL (ref 12–17)
IMM GRANULOCYTES # BLD AUTO: 0.02 THOUSAND/UL (ref 0–0.2)
IMM GRANULOCYTES NFR BLD AUTO: 0 % (ref 0–2)
LYMPHOCYTES # BLD AUTO: 1.02 THOUSANDS/ÂΜL (ref 0.6–4.47)
LYMPHOCYTES NFR BLD AUTO: 15 % (ref 14–44)
MCH RBC QN AUTO: 32.6 PG (ref 26.8–34.3)
MCHC RBC AUTO-ENTMCNC: 34.2 G/DL (ref 31.4–37.4)
MCV RBC AUTO: 96 FL (ref 82–98)
MONOCYTES # BLD AUTO: 0.78 THOUSAND/ÂΜL (ref 0.17–1.22)
MONOCYTES NFR BLD AUTO: 12 % (ref 4–12)
NEUTROPHILS # BLD AUTO: 4.8 THOUSANDS/ÂΜL (ref 1.85–7.62)
NEUTS SEG NFR BLD AUTO: 72 % (ref 43–75)
NRBC BLD AUTO-RTO: 0 /100 WBCS
PLATELET # BLD AUTO: 226 THOUSANDS/UL (ref 149–390)
PMV BLD AUTO: 8.9 FL (ref 8.9–12.7)
POTASSIUM SERPL-SCNC: 4.3 MMOL/L (ref 3.5–5.3)
PROT SERPL-MCNC: 8 G/DL (ref 6.4–8.4)
RBC # BLD AUTO: 4.66 MILLION/UL (ref 3.88–5.62)
SODIUM SERPL-SCNC: 128 MMOL/L (ref 135–147)
WBC # BLD AUTO: 6.72 THOUSAND/UL (ref 4.31–10.16)

## 2024-03-25 PROCEDURE — 36415 COLL VENOUS BLD VENIPUNCTURE: CPT

## 2024-03-25 PROCEDURE — 72125 CT NECK SPINE W/O DYE: CPT

## 2024-03-25 PROCEDURE — 96360 HYDRATION IV INFUSION INIT: CPT

## 2024-03-25 PROCEDURE — 80053 COMPREHEN METABOLIC PANEL: CPT

## 2024-03-25 PROCEDURE — 99285 EMERGENCY DEPT VISIT HI MDM: CPT | Performed by: EMERGENCY MEDICINE

## 2024-03-25 PROCEDURE — 70450 CT HEAD/BRAIN W/O DYE: CPT

## 2024-03-25 PROCEDURE — 93005 ELECTROCARDIOGRAM TRACING: CPT

## 2024-03-25 PROCEDURE — 83036 HEMOGLOBIN GLYCOSYLATED A1C: CPT | Performed by: NURSE PRACTITIONER

## 2024-03-25 PROCEDURE — 71250 CT THORAX DX C-: CPT

## 2024-03-25 PROCEDURE — 84484 ASSAY OF TROPONIN QUANT: CPT

## 2024-03-25 PROCEDURE — 85025 COMPLETE CBC W/AUTO DIFF WBC: CPT

## 2024-03-25 PROCEDURE — 74176 CT ABD & PELVIS W/O CONTRAST: CPT

## 2024-03-25 PROCEDURE — 99222 1ST HOSP IP/OBS MODERATE 55: CPT | Performed by: NURSE PRACTITIONER

## 2024-03-25 PROCEDURE — 99284 EMERGENCY DEPT VISIT MOD MDM: CPT

## 2024-03-25 RX ORDER — MELOXICAM 7.5 MG/1
15 TABLET ORAL DAILY
Status: DISCONTINUED | OUTPATIENT
Start: 2024-03-26 | End: 2024-03-26 | Stop reason: HOSPADM

## 2024-03-25 RX ORDER — DIVALPROEX SODIUM 500 MG/1
1000 TABLET, DELAYED RELEASE ORAL
Status: DISCONTINUED | OUTPATIENT
Start: 2024-03-26 | End: 2024-03-26 | Stop reason: HOSPADM

## 2024-03-25 RX ORDER — DIVALPROEX SODIUM 500 MG/1
1000 TABLET, DELAYED RELEASE ORAL
COMMUNITY

## 2024-03-25 RX ORDER — RISPERIDONE 1 MG/1
2 TABLET ORAL
Status: DISCONTINUED | OUTPATIENT
Start: 2024-03-25 | End: 2024-03-26 | Stop reason: HOSPADM

## 2024-03-25 RX ORDER — MELATONIN
1000 DAILY
Status: DISCONTINUED | OUTPATIENT
Start: 2024-03-26 | End: 2024-03-26 | Stop reason: HOSPADM

## 2024-03-25 RX ORDER — MELOXICAM 15 MG/1
15 TABLET ORAL DAILY
COMMUNITY

## 2024-03-25 RX ORDER — ACETAMINOPHEN 325 MG/1
975 TABLET ORAL EVERY 8 HOURS SCHEDULED
Status: DISCONTINUED | OUTPATIENT
Start: 2024-03-25 | End: 2024-03-26 | Stop reason: HOSPADM

## 2024-03-25 RX ORDER — VALBENAZINE 80 MG/1
80 CAPSULE ORAL DAILY
COMMUNITY
End: 2024-03-26

## 2024-03-25 RX ORDER — KETOROLAC TROMETHAMINE 30 MG/ML
15 INJECTION, SOLUTION INTRAMUSCULAR; INTRAVENOUS ONCE
Status: COMPLETED | OUTPATIENT
Start: 2024-03-25 | End: 2024-03-25

## 2024-03-25 RX ORDER — TAMSULOSIN HYDROCHLORIDE 0.4 MG/1
0.4 CAPSULE ORAL
Status: DISCONTINUED | OUTPATIENT
Start: 2024-03-26 | End: 2024-03-26 | Stop reason: HOSPADM

## 2024-03-25 RX ORDER — PANTOPRAZOLE SODIUM 40 MG/1
40 TABLET, DELAYED RELEASE ORAL
Status: DISCONTINUED | OUTPATIENT
Start: 2024-03-26 | End: 2024-03-26 | Stop reason: HOSPADM

## 2024-03-25 RX ORDER — ENOXAPARIN SODIUM 100 MG/ML
40 INJECTION SUBCUTANEOUS DAILY
Status: DISCONTINUED | OUTPATIENT
Start: 2024-03-26 | End: 2024-03-26 | Stop reason: HOSPADM

## 2024-03-25 RX ORDER — LIDOCAINE 50 MG/G
1 PATCH TOPICAL DAILY
Status: DISCONTINUED | OUTPATIENT
Start: 2024-03-25 | End: 2024-03-26 | Stop reason: HOSPADM

## 2024-03-25 RX ORDER — METHOCARBAMOL 500 MG/1
500 TABLET, FILM COATED ORAL EVERY 6 HOURS PRN
Status: DISCONTINUED | OUTPATIENT
Start: 2024-03-25 | End: 2024-03-26 | Stop reason: HOSPADM

## 2024-03-25 RX ADMIN — LIDOCAINE 5% 1 PATCH: 700 PATCH TOPICAL at 23:31

## 2024-03-25 RX ADMIN — KETOROLAC TROMETHAMINE 15 MG: 30 INJECTION, SOLUTION INTRAMUSCULAR; INTRAVENOUS at 23:33

## 2024-03-25 RX ADMIN — SODIUM CHLORIDE 500 ML: 0.9 INJECTION, SOLUTION INTRAVENOUS at 18:03

## 2024-03-25 RX ADMIN — RISPERIDONE 2 MG: 1 TABLET, FILM COATED ORAL at 23:31
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
## 2024-03-26 VITALS
TEMPERATURE: 97.2 F | BODY MASS INDEX: 23.97 KG/M2 | RESPIRATION RATE: 17 BRPM | DIASTOLIC BLOOD PRESSURE: 70 MMHG | HEIGHT: 71 IN | OXYGEN SATURATION: 93 % | SYSTOLIC BLOOD PRESSURE: 118 MMHG | WEIGHT: 171.2 LBS | HEART RATE: 59 BPM

## 2024-03-26 LAB
ANION GAP SERPL CALCULATED.3IONS-SCNC: 4 MMOL/L (ref 4–13)
BUN SERPL-MCNC: 8 MG/DL (ref 5–25)
CALCIUM SERPL-MCNC: 9.1 MG/DL (ref 8.4–10.2)
CHLORIDE SERPL-SCNC: 97 MMOL/L (ref 96–108)
CHOLEST SERPL-MCNC: 133 MG/DL
CO2 SERPL-SCNC: 28 MMOL/L (ref 21–32)
CREAT SERPL-MCNC: 0.79 MG/DL (ref 0.6–1.3)
ERYTHROCYTE [DISTWIDTH] IN BLOOD BY AUTOMATED COUNT: 13.3 % (ref 11.6–15.1)
EST. AVERAGE GLUCOSE BLD GHB EST-MCNC: 151 MG/DL
GFR SERPL CREATININE-BSD FRML MDRD: 95 ML/MIN/1.73SQ M
GLUCOSE SERPL-MCNC: 153 MG/DL (ref 65–140)
HBA1C MFR BLD: 6.9 %
HCT VFR BLD AUTO: 39.7 % (ref 36.5–49.3)
HDLC SERPL-MCNC: 44 MG/DL
HGB BLD-MCNC: 13.7 G/DL (ref 12–17)
LDLC SERPL CALC-MCNC: 73 MG/DL (ref 0–100)
MCH RBC QN AUTO: 32.9 PG (ref 26.8–34.3)
MCHC RBC AUTO-ENTMCNC: 34.5 G/DL (ref 31.4–37.4)
MCV RBC AUTO: 95 FL (ref 82–98)
NONHDLC SERPL-MCNC: 89 MG/DL
OSMOLALITY UR/SERPL-RTO: 286 MMOL/KG (ref 282–298)
OSMOLALITY UR: 270 MMOL/KG
PLATELET # BLD AUTO: 215 THOUSANDS/UL (ref 149–390)
PMV BLD AUTO: 9.4 FL (ref 8.9–12.7)
POTASSIUM SERPL-SCNC: 4 MMOL/L (ref 3.5–5.3)
RBC # BLD AUTO: 4.17 MILLION/UL (ref 3.88–5.62)
SODIUM 24H UR-SCNC: 22 MOL/L
SODIUM SERPL-SCNC: 129 MMOL/L (ref 135–147)
TRIGL SERPL-MCNC: 79 MG/DL
WBC # BLD AUTO: 7.05 THOUSAND/UL (ref 4.31–10.16)

## 2024-03-26 PROCEDURE — 99239 HOSP IP/OBS DSCHRG MGMT >30: CPT | Performed by: INTERNAL MEDICINE

## 2024-03-26 PROCEDURE — 83930 ASSAY OF BLOOD OSMOLALITY: CPT

## 2024-03-26 PROCEDURE — 80048 BASIC METABOLIC PNL TOTAL CA: CPT | Performed by: NURSE PRACTITIONER

## 2024-03-26 PROCEDURE — 83935 ASSAY OF URINE OSMOLALITY: CPT

## 2024-03-26 PROCEDURE — 80061 LIPID PANEL: CPT | Performed by: NURSE PRACTITIONER

## 2024-03-26 PROCEDURE — 85027 COMPLETE CBC AUTOMATED: CPT | Performed by: NURSE PRACTITIONER

## 2024-03-26 PROCEDURE — 84300 ASSAY OF URINE SODIUM: CPT

## 2024-03-26 RX ORDER — LIDOCAINE 50 MG/G
1 PATCH TOPICAL DAILY
Qty: 20 PATCH | Refills: 0 | Status: SHIPPED | OUTPATIENT
Start: 2024-03-27 | End: 2024-04-16

## 2024-03-26 RX ORDER — METHOCARBAMOL 500 MG/1
500 TABLET, FILM COATED ORAL EVERY 6 HOURS PRN
Qty: 20 TABLET | Refills: 0 | Status: SHIPPED | OUTPATIENT
Start: 2024-03-26

## 2024-03-26 RX ORDER — DOCUSATE SODIUM 100 MG/1
100 CAPSULE, LIQUID FILLED ORAL 2 TIMES DAILY
Status: DISCONTINUED | OUTPATIENT
Start: 2024-03-26 | End: 2024-03-26 | Stop reason: HOSPADM

## 2024-03-26 RX ADMIN — MELOXICAM 15 MG: 7.5 TABLET ORAL at 10:07

## 2024-03-26 RX ADMIN — ENOXAPARIN SODIUM 40 MG: 40 INJECTION SUBCUTANEOUS at 10:09

## 2024-03-26 RX ADMIN — DIVALPROEX SODIUM 1000 MG: 500 TABLET, DELAYED RELEASE ORAL at 05:18

## 2024-03-26 RX ADMIN — PANTOPRAZOLE SODIUM 40 MG: 40 TABLET, DELAYED RELEASE ORAL at 05:21

## 2024-03-26 RX ADMIN — LIDOCAINE 5% 1 PATCH: 700 PATCH TOPICAL at 10:06

## 2024-03-26 RX ADMIN — Medication 1000 UNITS: at 10:06

## 2024-03-26 RX ADMIN — DOCUSATE SODIUM 100 MG: 100 CAPSULE, LIQUID FILLED ORAL at 13:33

## 2024-03-26 NOTE — ASSESSMENT & PLAN NOTE
Stable   Continue home meds  Depakote DR 1000 mg AM   Risperdal 2 mg HS  Invega injection q 28 days
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
## 2024-03-26 NOTE — ASSESSMENT & PLAN NOTE
Chronic hyponatremia.  Na 126-129 since last summer.   Monitor BMP  Additional work up if worsening
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
## 2024-03-26 NOTE — ASSESSMENT & PLAN NOTE
Noted on CT C spine  Multiple thyroid nodules have increased compared to prior US 2012  Consider non emergent US as outpatient  Follow up with PCP

## 2024-03-26 NOTE — ASSESSMENT & PLAN NOTE
Lab Results   Component Value Date    HGBA1C 6.3 (H) 12/13/2023     Appears diet controlled  Update A1c  Monitor glucose on BMP
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
## 2024-03-26 NOTE — PLAN OF CARE
Problem: PAIN - ADULT  Goal: Verbalizes/displays adequate comfort level or baseline comfort level  Description: Interventions:  - Encourage patient to monitor pain and request assistance  - Assess pain using appropriate pain scale  - Administer analgesics based on type and severity of pain and evaluate response  - Implement non-pharmacological measures as appropriate and evaluate response  - Consider cultural and social influences on pain and pain management  - Notify physician/advanced practitioner if interventions unsuccessful or patient reports new pain  Outcome: Adequate for Discharge     Problem: INFECTION - ADULT  Goal: Absence or prevention of progression during hospitalization  Description: INTERVENTIONS:  - Assess and monitor for signs and symptoms of infection  - Monitor lab/diagnostic results  - Monitor all insertion sites, i.e. indwelling lines, tubes, and drains  - Monitor endotracheal if appropriate and nasal secretions for changes in amount and color  - Aquilla appropriate cooling/warming therapies per order  - Administer medications as ordered  - Instruct and encourage patient and family to use good hand hygiene technique  - Identify and instruct in appropriate isolation precautions for identified infection/condition  Outcome: Adequate for Discharge     Problem: SAFETY ADULT  Goal: Patient will remain free of falls  Description: INTERVENTIONS:  - Educate patient/family on patient safety including physical limitations  - Instruct patient to call for assistance with activity   - Consult OT/PT to assist with strengthening/mobility   - Keep Call bell within reach  - Keep bed low and locked with side rails adjusted as appropriate  - Keep care items and personal belongings within reach  - Initiate and maintain comfort rounds  - Make Fall Risk Sign visible to staff  - Offer Toileting every 2 Hours, in advance of need  - Initiate/Maintain bed alarm  - Obtain necessary fall risk management equipment  -  Apply yellow socks and bracelet for high fall risk patients  - Consider moving patient to room near nurses station  Outcome: Adequate for Discharge
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
## 2024-03-26 NOTE — ASSESSMENT & PLAN NOTE
Stable   Continue home meds  Depakote DR 1000 mg AM   Risperdal 2 mg HS  Invega injection q 28 days  Follow up with PCP for discussing about medication

## 2024-03-26 NOTE — ASSESSMENT & PLAN NOTE
Chronic hyponatremia.  Na 126-129 since last summer.   Monitor BMP  Additional work up shows urine osmolality 270, urine sodium 22, serum osmolality 286  Possibly from SIADH due to psych medications  Follow up with PCP for medication adjustment

## 2024-03-26 NOTE — ASSESSMENT & PLAN NOTE
Noted on CT C spine  Multiple thyroid nodules have increased compared to prior US 2012  Consider non emergent US as op

## 2024-03-26 NOTE — ASSESSMENT & PLAN NOTE
Lab Results   Component Value Date    HGBA1C 6.9 (H) 03/25/2024     Appears diet controlled  Update A1c  On BMP glucose level was 153  Follow up with PCP
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
## 2024-03-31 LAB
ATRIAL RATE: 57 BPM
ATRIAL RATE: 60 BPM
ATRIAL RATE: 70 BPM
P AXIS: 71 DEGREES
P AXIS: 75 DEGREES
P AXIS: 76 DEGREES
PR INTERVAL: 176 MS
PR INTERVAL: 186 MS
PR INTERVAL: 188 MS
QRS AXIS: 53 DEGREES
QRS AXIS: 74 DEGREES
QRS AXIS: 78 DEGREES
QRSD INTERVAL: 88 MS
QRSD INTERVAL: 92 MS
QRSD INTERVAL: 92 MS
QT INTERVAL: 358 MS
QT INTERVAL: 408 MS
QT INTERVAL: 412 MS
QTC INTERVAL: 386 MS
QTC INTERVAL: 397 MS
QTC INTERVAL: 412 MS
T WAVE AXIS: 48 DEGREES
T WAVE AXIS: 65 DEGREES
T WAVE AXIS: 68 DEGREES
VENTRICULAR RATE: 57 BPM
VENTRICULAR RATE: 60 BPM
VENTRICULAR RATE: 70 BPM

## 2024-03-31 PROCEDURE — 93010 ELECTROCARDIOGRAM REPORT: CPT | Performed by: INTERNAL MEDICINE
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
## 2024-11-15 NOTE — ED PROVIDER NOTES
History  Chief Complaint   Patient presents with   • Psychiatric Evaluation     Pt presents to the ed via ems after calling 911 due to "wanting to die because he was skin and bones and has no blood", hx of schizophrenia      58year-old-male with history of schizophrenia who presents for psychiatric evaluation  Patient reports that he has not been eating for the past few weeks because he thinks the food in his home is rotten  He does not think the food is poisoned but is worried that it will make him sick  When questioned as to why patient did not attempt to get new food in his home, he states that he is unsure what happened to his car  He states that he cleaned the car out yesterday because it was full of mud and dirt and "doesn't know if the terrorists took it or if it had a problem" but that his car was gone this morning  He states that he feels confused about life but is unable to clarify what this means  He comments on calling [employer] to try to get social security and worker's compensation  He had reportedly stated to EMS that he "just wants to die" but denies any SI or HI at this time  He states that he feels unsafe at home because of the food situation  He denies hallucinations  Prior to Admission Medications   Prescriptions Last Dose Informant Patient Reported? Taking?    Chanda Gibson Sustenna 234 MG/1 5ML IM injection   Yes No   Patient not taking: Reported on 11/7/2022   OneTouch Ultra test strip   Yes No   Tradjenta 5 MG TABS   Yes No   Patient not taking: Reported on 11/7/2022   atorvastatin (LIPITOR) 20 mg tablet   Yes No   Patient not taking: Reported on 11/7/2022   divalproex sodium (DEPAKOTE) 500 mg EC tablet   Yes No   Sig: Take 500 mg by mouth 2 (two) times a day   Patient not taking: Reported on 11/7/2022   ibuprofen (MOTRIN) 600 mg tablet   No No   Sig: Take 1 tablet (600 mg total) by mouth 3 (three) times a day for 10 days   ipratropium-albuterol (Combivent Respimat) inhaler   Yes No   Sig: [facility]  Progress Note    Patient: Sujit Wood Age: 63 y.o. : 1961 MR#: [id number] SSN: [id number]  Date/Time: 11/15/2024     C/C: SOB With Hypoxia / COPD acute       Subjective:   HPI : Copd Exacerbation , Ca Lung on XRT and Chemo - Onco following.          Review of Systems:  -Dry cough  -\" Whole body shaking, Meltus clinching, sometimes difficulty in seeing with radiation\"-per patient  Denies chest pain palpitation  No nausea vomiting diarrhea  Shortness of breath is present but appears comfortable at rest    Assessment/Plan:     1.  Acute hypoxic respiratory failure-respiratory distress and relative hypoxia requiring higher oxygen    2 COPD exacerbation    3 lung cancer: Stage III NSC lung cancer, on chemo and XRT     4 depression- On Vraylar/Cariprazine and klonapin     5 chills and rigors: No evidence of infection ?  Medicine side effect    7 history of heroin use in past    8 DM2 - on basal and bolus insulin    9 Anemia - Unspecified         Planning    -Continue supplemental oxygen to maintain O2 saturation above 92%, continue steroids and bronchodilators    - DC vanco and Zosyn - So far No growth / all cultures are negative . Doxy for COPD exacerbation - MONITOR     - Psych consulted       Dispo Plan: 3 days , Home     Objective:       General:  Alert, cooperative, no acute distress   HEENT: No facial asymmetry, VIKI El, External ears - WNL    Cardiovascular: S1S2 - regular , No Murmur   Pulmonary: Equal expansion , No Use of accessory muscles , No Rales No Rhonchi    GI:  +BS in all four quadrants, soft, non-tender  Extremities:  No edema; 2+ dorsalis pedis pulses bilaterally  Neuro: Alert and oriented X 2. // Generalized shakes / Tremors .       DVT Prophylaxis:  []Lovenox  []Hep SQ  []SCDs  []Coumadin   []On Heparin gtt    [] Eliquis [] Xarelto     Vitals:         VS: /80   Pulse 97   Temp 98.6 °F (37 °C) (Oral)   Resp 20   Ht 1.626 m (5'  4.02\")   Wt 63.5 kg (139 lb 14.4 oz)   SpO2 96%   BMI 24.00 kg/m²    Tmax/24hrs: Temp (24hrs), Av.2 °F (36.8 °C), Min:97.5 °F (36.4 °C), Max:98.6 °F (37 °C)        Medications:   Current Facility-Administered Medications   Medication Dose Route Frequency    ipratropium 0.5 mg-albuterol 2.5 mg (DUONEB) nebulizer solution 1 Dose  1 Dose Inhalation Q6H WA RT    vancomycin (VANCOCIN) 750 mg in sodium chloride 0.9 % 250 mL IVPB ([id number])  750 mg IntraVENous Q12H    sodium chloride flush 0.9 % injection 5-40 mL  5-40 mL IntraVENous 2 times per day    sodium chloride flush 0.9 % injection 5-40 mL  5-40 mL IntraVENous PRN    0.9 % sodium chloride infusion   IntraVENous PRN    ondansetron (ZOFRAN-ODT) disintegrating tablet 4 mg  4 mg Oral Q8H PRN    Or    ondansetron (ZOFRAN) injection 4 mg  4 mg IntraVENous Q6H PRN    polyethylene glycol (GLYCOLAX) packet 17 g  17 g Oral Daily PRN    enoxaparin (LOVENOX) injection 40 mg  40 mg SubCUTAneous Daily    acetaminophen (TYLENOL) tablet 650 mg  650 mg Oral Q6H PRN    Or    acetaminophen (TYLENOL) suppository 650 mg  650 mg Rectal Q6H PRN    guaiFENesin (MUCINEX) extended release tablet 600 mg  600 mg Oral BID    benzonatate (TESSALON) capsule 100 mg  100 mg Oral TID PRN    methylPREDNISolone sodium succ (SOLU-MEDROL) 40 mg in sterile water 1 mL injection  40 mg IntraVENous Q6H    Followed by    [START ON 2024] predniSONE (DELTASONE) tablet 40 mg  40 mg Oral Daily    piperacillin-tazobactam (ZOSYN) 3,375 mg in sodium chloride 0.9 % 50 mL IVPB (mini-bag)  3,375 mg IntraVENous Q8H    insulin glargine (LANTUS) injection vial 10 Units  10 Units SubCUTAneous Nightly    insulin lispro (HUMALOG,ADMELOG) injection vial 0-8 Units  0-8 Units SubCUTAneous 4x Daily AC & HS    morphine (PF) injection 2 mg  2 mg IntraVENous Q3H PRN    Or    morphine sulfate (PF) injection 4 mg  4 mg IntraVENous Q3H PRN       Labs:    Recent Labs     24  0144 11/15/24  0222   WBC 8.6 12.5   HGB  Inhale 1 puff 4 (four) times a day as needed   meloxicam (MOBIC) 15 mg tablet   Yes No   Patient not taking: Reported on 11/7/2022   metFORMIN (GLUCOPHAGE) 500 mg tablet   Yes No   Patient not taking: Reported on 11/7/2022   risperiDONE (RisperDAL) 1 mg tablet   Yes No   Sig: Take 4 mg by mouth daily   Patient not taking: Reported on 11/7/2022      Facility-Administered Medications: None       History reviewed  No pertinent past medical history  History reviewed  No pertinent surgical history  History reviewed  No pertinent family history  I have reviewed and agree with the history as documented  E-Cigarette/Vaping   • E-Cigarette Use Current Every Day User      E-Cigarette/Vaping Substances   • Nicotine Yes    • THC No    • CBD No    • Flavoring No    • Other No    • Unknown No      Social History     Tobacco Use   • Smoking status: Every Day     Types: Cigars   • Smokeless tobacco: Never   Vaping Use   • Vaping Use: Every day   • Substances: Nicotine   Substance Use Topics   • Alcohol use: Not Currently     Comment: rare   • Drug use: Not Currently     Comment: "i used to"       Review of Systems   Constitutional: Negative for chills and fever  HENT: Negative for congestion and sore throat  Eyes: Negative for visual disturbance  Respiratory: Negative for chest tightness and shortness of breath  Cardiovascular: Negative for chest pain and leg swelling  Gastrointestinal: Negative for abdominal pain, diarrhea, nausea and vomiting  Genitourinary: Negative for difficulty urinating and flank pain  Musculoskeletal: Negative for back pain and gait problem  Skin: Negative for pallor and rash  Neurological: Negative for syncope, weakness and light-headedness  Psychiatric/Behavioral: Negative for hallucinations and suicidal ideas  All other systems reviewed and are negative  Physical Exam  Physical Exam  Vitals and nursing note reviewed     Constitutional:       General: He is not in 7.5* 7.4*   HCT 23.7* 23.8*    243     Recent Labs     11/13/24  2045 11/14/24  1056 11/15/24  0222    136 142   K 4.4 5.1 5.1    111 110   CO2 28 21 26   BUN 36* 33* 32*   MG 1.8  --   --    ALT 22  --   --          Time spent on direct patient care >35 mints     Complexity : High complex - due to multiple medical issues outlined above.     CODE Status : Full     Case discussed with:   [] Family  [x]Nursing  []Case Management         Disclaimer: Sections of this note are dictated utilizing voice recognition software, which may have resulted in some phonetic based errors in grammar and contents. Even though attempts were made to correct all the mistakes, some may have been missed, and remained in the body of the document. If questions arise, please contact our department.    Signed By: Talisha Ndiaye MD     November 15, 2024       acute distress  Appearance: He is not ill-appearing  HENT:      Head: Normocephalic and atraumatic  Nose: Nose normal       Mouth/Throat:      Mouth: Mucous membranes are dry  Eyes:      Extraocular Movements: Extraocular movements intact  Pupils: Pupils are equal, round, and reactive to light  Cardiovascular:      Rate and Rhythm: Normal rate and regular rhythm  Heart sounds: No murmur heard  No friction rub  No gallop  Pulmonary:      Effort: Pulmonary effort is normal       Breath sounds: Normal breath sounds  No wheezing, rhonchi or rales  Abdominal:      General: There is no distension  Palpations: Abdomen is soft  Tenderness: There is no abdominal tenderness  Musculoskeletal:         General: No swelling or tenderness  Normal range of motion  Cervical back: Normal range of motion and neck supple  Skin:     General: Skin is warm and dry  Coloration: Skin is not pale  Findings: No rash  Neurological:      General: No focal deficit present  Mental Status: He is alert and oriented to person, place, and time  Psychiatric:         Attention and Perception: Attention normal          Mood and Affect: Affect is flat  Speech: Speech is tangential          Behavior: Behavior normal  Behavior is cooperative  Thought Content: Thought content is delusional  Thought content is not paranoid  Thought content does not include homicidal or suicidal ideation           Vital Signs  ED Triage Vitals [01/12/23 0100]   Temperature Pulse Respirations Blood Pressure SpO2   (!) 97 4 °F (36 3 °C) 77 16 144/79 94 %      Temp Source Heart Rate Source Patient Position - Orthostatic VS BP Location FiO2 (%)   Oral Monitor Sitting Left arm --      Pain Score       --           Vitals:    01/12/23 0100   BP: 144/79   Pulse: 77   Patient Position - Orthostatic VS: Sitting         Visual Acuity      ED Medications  Medications - No data to display    Diagnostic Studies  Results Reviewed     Procedure Component Value Units Date/Time    FLU/RSV/COVID - if FLU/RSV clinically relevant [[id number]]  (Normal) Collected: 01/12/23 0127    Lab Status: Final result Specimen: Nares from Nose Updated: 01/12/23 0215     SARS-CoV-2 Negative     INFLUENZA A PCR Negative     INFLUENZA B PCR Negative     RSV PCR Negative    Narrative:      FOR PEDIATRIC PATIENTS - copy/paste COVID Guidelines URL to browser: [email or web address]  Pro Stream +x    SARS-CoV-2 assay is a Nucleic Acid Amplification assay intended for the  qualitative detection of nucleic acid from SARS-CoV-2 in nasopharyngeal  swabs  Results are for the presumptive identification of SARS-CoV-2 RNA  Positive results are indicative of infection with SARS-CoV-2, the virus  causing COVID-19, but do not rule out bacterial infection or co-infection  with other viruses  Laboratories within the [place] and its  territories are required to report all positive results to the appropriate  public health authorities  Negative results do not preclude SARS-CoV-2  infection and should not be used as the sole basis for treatment or other  patient management decisions  Negative results must be combined with  clinical observations, patient history, and epidemiological information  This test has not been FDA cleared or approved  This test has been authorized by FDA under an Emergency Use Authorization  (EUA)  This test is only authorized for the duration of time the  declaration that circumstances exist justifying the authorization of the  emergency use of an in vitro diagnostic tests for detection of SARS-CoV-2  virus and/or diagnosis of COVID-19 infection under section 564(b)(1) of  the Act, 21 U  S C  962NXJ-1(G)(9), unless the authorization is terminated  or revoked sooner  The test has been validated but independent review by FDA  and CLIA is pending      Test performed using [employer]: This RT-PCR assay targets N2,  a region unique to SARS-CoV-2  A conserved region in the E-gene was chosen  for pan-Sarbecovirus detection which includes SARS-CoV-2  According to CMS-2020-01-R, this platform meets the definition of high-throughput technology  TSH, 3rd generation with Free T4 reflex [[id number]]  (Normal) Collected: 01/12/23 0127    Lab Status: Final result Specimen: Blood from Arm, Left Updated: 01/12/23 0214     TSH 3RD GENERATON 1 840 uIU/mL     Narrative:      Patients undergoing fluorescein dye angiography may retain small amounts of fluorescein in the body for 48-72 hours post procedure  Samples containing fluorescein can produce falsely depressed TSH values  If the patient had this procedure,a specimen should be resubmitted post fluorescein clearance  Salicylate level [[id number]]  (Normal) Collected: 01/12/23 0127    Lab Status: Final result Specimen: Blood from Arm, Left Updated: 14/69/14 1002     Salicylate Lvl <5 mg/dL     Acetaminophen level-If concentration is detectable, please discuss with medical  on call   [[id number]]  (Abnormal) Collected: 01/12/23 0127    Lab Status: Final result Specimen: Blood from Arm, Left Updated: 01/12/23 0158     Acetaminophen Level <10 ug/mL     Ethanol [[id number]]  (Normal) Collected: 01/12/23 0127    Lab Status: Final result Specimen: Blood from Arm, Left Updated: 01/12/23 0158     Ethanol Lvl <10 mg/dL     Comprehensive metabolic panel [[id number]]  (Abnormal) Collected: 01/12/23 0127    Lab Status: Final result Specimen: Blood from Arm, Left Updated: 01/12/23 0158     Sodium 138 mmol/L      Potassium 3 5 mmol/L      Chloride 103 mmol/L      CO2 27 mmol/L      ANION GAP 8 mmol/L      BUN 3 mg/dL      Creatinine 0 83 mg/dL      Glucose 110 mg/dL      Calcium 8 9 mg/dL      AST 21 U/L      ALT 16 U/L      Alkaline Phosphatase 67 U/L      Total Protein 6 7 g/dL      Albumin 4 1 g/dL      Total Bilirubin 0 61 mg/dL      eGFR 94 ml/min/1 73sq m     Narrative:      National Kidney Disease Foundation guidelines for Chronic Kidney Disease (CKD):   •  Stage 1 with normal or high GFR (GFR > 90 mL/min/1 73 square meters)  •  Stage 2 Mild CKD (GFR = 60-89 mL/min/1 73 square meters)  •  Stage 3A Moderate CKD (GFR = 45-59 mL/min/1 73 square meters)  •  Stage 3B Moderate CKD (GFR = 30-44 mL/min/1 73 square meters)  •  Stage 4 Severe CKD (GFR = 15-29 mL/min/1 73 square meters)  •  Stage 5 End Stage CKD (GFR <15 mL/min/1 73 square meters)  Note: GFR calculation is accurate only with a steady state creatinine    CBC and differential [[id number]]  (Abnormal) Collected: 01/12/23 0127    Lab Status: Final result Specimen: Blood from Arm, Left Updated: 01/12/23 0143     WBC 4 32 Thousand/uL      RBC 4 29 Million/uL      Hemoglobin 15 2 g/dL      Hematocrit 43 9 %       fL      MCH 35 4 pg      MCHC 34 6 g/dL      RDW 13 2 %      MPV 9 1 fL      Platelets 728 Thousands/uL      nRBC 0 /100 WBCs      Neutrophils Relative 43 %      Immat GRANS % 1 %      Lymphocytes Relative 36 %      Monocytes Relative 15 %      Eosinophils Relative 4 %      Basophils Relative 1 %      Neutrophils Absolute 1 92 Thousands/µL      Immature Grans Absolute 0 02 Thousand/uL      Lymphocytes Absolute 1 56 Thousands/µL      Monocytes Absolute 0 64 Thousand/µL      Eosinophils Absolute 0 15 Thousand/µL      Basophils Absolute 0 03 Thousands/µL     UA w Reflex to Microscopic w Reflex to Culture [[id number]]     Lab Status: No result Specimen: Urine     Rapid drug screen, urine [[id number]]     Lab Status: No result Specimen: Urine                  CT head without contrast   Final Result by Yoav Mendiola DO (01/12 0236)      No acute intracranial abnormality is seen  Other findings as above                    Workstation performed: [id number]                    Procedures  Procedures         ED Course  ED Course as of 01/12/23 0715   Thu Jan 12, 2023   0133 Procedure Note: EKG  Date/Time: 01/12/23 1:28 AM   Interpreted by: Andrew Pereira  Indications / Diagnosis: davida psych  ECG reviewed by me, the ED Provider: yes   The EKG demonstrates:  Rhythm: rate 72, normal sinus  Intervals: normal intervals  Axis: normal axis  QRS/Blocks: normal QRS  ST Changes: No acute ST Changes, no STD/BRITTANY     0144 CBC and differential(!)   0159 Coma panel(!)   0159 Comprehensive metabolic panel(!)   4535 CT head without contrast   0238 Medically clear for crisis evaluation  SBIRT 22yo+    Flowsheet Row Most Recent Value   SBIRT (25 yo +)    In order to provide better care to our patients, we are screening all of our patients for alcohol and drug use  Would it be okay to ask you these screening questions? No Filed at: 01/12/2023 0141                    Medical Decision Making  58year old male presenting for psychiatric evaluation  Patient with apparent delusions including thinking his foot is rotten at home causing poor oral intake along with thoughts that terrorists took his car  CT head and labs obtained to rule out medical cause and evaluate for altered mental status as patient stating he "feels confused " CT head unremarkable along with labs  Medically clear for crisis evaluation  Signed out to Dr Daniel Yan pending crisis eval     Delusions [facility]): acute illness or injury  Amount and/or Complexity of Data Reviewed  Labs: ordered  Decision-making details documented in ED Course  Radiology: ordered  Decision-making details documented in ED Course  Risk  Decision regarding hospitalization            Disposition  Final diagnoses:   Delusions ([address] )     Time reflects when diagnosis was documented in both MDM as applicable and the Disposition within this note     Time User Action Codes Description Comment    1/12/2023  2:39 AM Andrew Pereira Add [F22] Delusions [facility])       ED Disposition     ED Disposition   Transfer to Behavioral Health Condition   -- Date/Time   Thu Jan 12, 2023  2:39 AM    Comment   Sully Frank should be transferred out to behavioral health and has been medically cleared  Follow-up Information    None         Patient's Medications   Discharge Prescriptions    No medications on file       No discharge procedures on file      PDMP Review     None          ED Provider  Electronically Signed by           Kaila Maldonado MD  01/12/23 0435

## 2025-04-10 ENCOUNTER — HOSPITAL ENCOUNTER (EMERGENCY)
Facility: HOSPITAL | Age: 65
Discharge: HOME | End: 2025-04-10
Attending: EMERGENCY MEDICINE
Payer: OTHER GOVERNMENT

## 2025-04-10 ENCOUNTER — APPOINTMENT (EMERGENCY)
Dept: RADIOLOGY | Facility: HOSPITAL | Age: 65
End: 2025-04-10
Payer: OTHER GOVERNMENT

## 2025-04-10 VITALS
BODY MASS INDEX: 24.6 KG/M2 | DIASTOLIC BLOOD PRESSURE: 56 MMHG | WEIGHT: 176.37 LBS | OXYGEN SATURATION: 98 % | TEMPERATURE: 97.4 F | HEART RATE: 55 BPM | SYSTOLIC BLOOD PRESSURE: 109 MMHG | RESPIRATION RATE: 16 BRPM

## 2025-04-10 DIAGNOSIS — E87.1 HYPONATREMIA: ICD-10-CM

## 2025-04-10 DIAGNOSIS — R55 NEAR SYNCOPE: Primary | ICD-10-CM

## 2025-04-10 LAB
2HR DELTA HS TROPONIN: 0 NG/L
ALBUMIN SERPL BCG-MCNC: 4.2 G/DL (ref 3.5–5)
ALP SERPL-CCNC: 76 U/L (ref 34–104)
ALT SERPL W P-5'-P-CCNC: 7 U/L (ref 7–52)
ANION GAP SERPL CALCULATED.3IONS-SCNC: 7 MMOL/L (ref 4–13)
AST SERPL W P-5'-P-CCNC: 10 U/L (ref 13–39)
ATRIAL RATE: 56 BPM
BASOPHILS # BLD AUTO: 0.03 THOUSANDS/ÂΜL (ref 0–0.1)
BASOPHILS NFR BLD AUTO: 0 % (ref 0–1)
BILIRUB SERPL-MCNC: 0.43 MG/DL (ref 0.2–1)
BUN SERPL-MCNC: 13 MG/DL (ref 5–25)
CALCIUM SERPL-MCNC: 9.2 MG/DL (ref 8.4–10.2)
CARDIAC TROPONIN I PNL SERPL HS: 4 NG/L (ref ?–50)
CARDIAC TROPONIN I PNL SERPL HS: 4 NG/L (ref ?–50)
CHLORIDE SERPL-SCNC: 93 MMOL/L (ref 96–108)
CK SERPL-CCNC: 62 U/L (ref 39–308)
CO2 SERPL-SCNC: 30 MMOL/L (ref 21–32)
CREAT SERPL-MCNC: 0.96 MG/DL (ref 0.6–1.3)
D DIMER PPP FEU-MCNC: <0.27 UG/ML FEU
EOSINOPHIL # BLD AUTO: 0.15 THOUSAND/ÂΜL (ref 0–0.61)
EOSINOPHIL NFR BLD AUTO: 2 % (ref 0–6)
ERYTHROCYTE [DISTWIDTH] IN BLOOD BY AUTOMATED COUNT: 12.5 % (ref 11.6–15.1)
GFR SERPL CREATININE-BSD FRML MDRD: 83 ML/MIN/1.73SQ M
GLUCOSE SERPL-MCNC: 158 MG/DL (ref 65–140)
HCT VFR BLD AUTO: 40.2 % (ref 36.5–49.3)
HGB BLD-MCNC: 13.8 G/DL (ref 12–17)
IMM GRANULOCYTES # BLD AUTO: 0.06 THOUSAND/UL (ref 0–0.2)
IMM GRANULOCYTES NFR BLD AUTO: 1 % (ref 0–2)
LYMPHOCYTES # BLD AUTO: 1.7 THOUSANDS/ÂΜL (ref 0.6–4.47)
LYMPHOCYTES NFR BLD AUTO: 25 % (ref 14–44)
MAGNESIUM SERPL-MCNC: 2 MG/DL (ref 1.9–2.7)
MCH RBC QN AUTO: 34.1 PG (ref 26.8–34.3)
MCHC RBC AUTO-ENTMCNC: 34.3 G/DL (ref 31.4–37.4)
MCV RBC AUTO: 99 FL (ref 82–98)
MONOCYTES # BLD AUTO: 0.75 THOUSAND/ÂΜL (ref 0.17–1.22)
MONOCYTES NFR BLD AUTO: 11 % (ref 4–12)
NEUTROPHILS # BLD AUTO: 4.01 THOUSANDS/ÂΜL (ref 1.85–7.62)
NEUTS SEG NFR BLD AUTO: 61 % (ref 43–75)
NRBC BLD AUTO-RTO: 0 /100 WBCS
P AXIS: 71 DEGREES
PLATELET # BLD AUTO: 218 THOUSANDS/UL (ref 149–390)
PMV BLD AUTO: 9.7 FL (ref 8.9–12.7)
POTASSIUM SERPL-SCNC: 4.3 MMOL/L (ref 3.5–5.3)
PR INTERVAL: 166 MS
PROT SERPL-MCNC: 7 G/DL (ref 6.4–8.4)
QRS AXIS: 41 DEGREES
QRSD INTERVAL: 84 MS
QT INTERVAL: 432 MS
QTC INTERVAL: 416 MS
RBC # BLD AUTO: 4.05 MILLION/UL (ref 3.88–5.62)
SODIUM SERPL-SCNC: 130 MMOL/L (ref 135–147)
T WAVE AXIS: 73 DEGREES
TSH SERPL DL<=0.05 MIU/L-ACNC: 2.88 UIU/ML (ref 0.45–4.5)
URATE SERPL-MCNC: 2.5 MG/DL (ref 3.5–8.5)
VALPROATE SERPL-MCNC: 83 UG/ML (ref 50–125)
VENTRICULAR RATE: 56 BPM
WBC # BLD AUTO: 6.7 THOUSAND/UL (ref 4.31–10.16)

## 2025-04-10 PROCEDURE — 84443 ASSAY THYROID STIM HORMONE: CPT | Performed by: EMERGENCY MEDICINE

## 2025-04-10 PROCEDURE — 36415 COLL VENOUS BLD VENIPUNCTURE: CPT | Performed by: EMERGENCY MEDICINE

## 2025-04-10 PROCEDURE — 80164 ASSAY DIPROPYLACETIC ACD TOT: CPT | Performed by: EMERGENCY MEDICINE

## 2025-04-10 PROCEDURE — 82550 ASSAY OF CK (CPK): CPT | Performed by: EMERGENCY MEDICINE

## 2025-04-10 PROCEDURE — 84484 ASSAY OF TROPONIN QUANT: CPT

## 2025-04-10 PROCEDURE — 93010 ELECTROCARDIOGRAM REPORT: CPT | Performed by: INTERNAL MEDICINE

## 2025-04-10 PROCEDURE — 80053 COMPREHEN METABOLIC PANEL: CPT

## 2025-04-10 PROCEDURE — 73564 X-RAY EXAM KNEE 4 OR MORE: CPT

## 2025-04-10 PROCEDURE — 93005 ELECTROCARDIOGRAM TRACING: CPT

## 2025-04-10 PROCEDURE — 99285 EMERGENCY DEPT VISIT HI MDM: CPT | Performed by: EMERGENCY MEDICINE

## 2025-04-10 PROCEDURE — 85025 COMPLETE CBC W/AUTO DIFF WBC: CPT

## 2025-04-10 PROCEDURE — 99284 EMERGENCY DEPT VISIT MOD MDM: CPT

## 2025-04-10 PROCEDURE — 71045 X-RAY EXAM CHEST 1 VIEW: CPT

## 2025-04-10 PROCEDURE — 84550 ASSAY OF BLOOD/URIC ACID: CPT | Performed by: EMERGENCY MEDICINE

## 2025-04-10 PROCEDURE — 83735 ASSAY OF MAGNESIUM: CPT

## 2025-04-10 PROCEDURE — 85379 FIBRIN DEGRADATION QUANT: CPT | Performed by: EMERGENCY MEDICINE

## 2025-04-10 NOTE — ED PROVIDER NOTES
Time reflects when diagnosis was documented in both MDM as applicable and the Disposition within this note       Time User Action Codes Description Comment    4/10/2025  3:54 AM Gayla Venegas Add [R55] Near syncope     4/10/2025  3:54 AM Gayla Venegas Add [E87.1] Hyponatremia           ED Disposition       ED Disposition   Discharge    Condition   Stable    Date/Time   Thu Apr 10, 2025  3:55 AM    Comment   Doc Beavers discharge to home/self care.                   Assessment & Plan       Medical Decision Making  Amount and/or Complexity of Data Reviewed  Labs: ordered. Decision-making details documented in ED Course.  Radiology: ordered and independent interpretation performed. Decision-making details documented in ED Course.    63 yo M presented to ED for near syncope just PTA. Associated symptoms: episode of vomiting, knee pain. Exam findings: Normal exam.      Differentials diagnoses considered: Vasovagal syncope versus electrolyte disturbance versus arrhythmia versus less likely ACS versus other.     See ED course for more details on lab and imaging interpretation, as well as pertinent information that occurred during patient's ED stay.  Based on these results and H&P,near syncope, hyponatremia. Results and clinical impressions were discussed with patient and family. They expressed understanding. Plan: d/c back to nursing home. This plan was also discussed with patient, who was agreeable with this plan. Patient was given the opportunity to ask questions in ED. All questions and concerns were addressed in ED.       This document was created using speech voice recognition software.   Grammatical errors, random word insertions, pronoun errors, and incomplete sentences are an occasional consequence of this system due to software limitations, ambient noise, and hardware issues.   Any formal questions or concerns about content, text, or information contained within the body of this dictation should be directly  addressed to the provider for clarification.     ED Course as of 04/10/25 0413   Thu Apr 10, 2025   0117 CBC and differential(!)  No leukocytosis or leukopenia.  Hemoglobin hematocrit within normal limits.  Platelets within normal limits.  Reassuring differential.    0127 XR chest 1 view portable  No acute cardiopulmonary process visualized.   0133 D-Dimer, Quant: <0.27   0141 Comprehensive metabolic panel(!)  Hyponatremia, chronic patient is on valproic acid which is complications medication.  Has been lower in the past.  Otherwise unremarkable.   0141 URIC ACID(!): 2.5  low   0141 MAGNESIUM: 2.0   0141 Total CK: 62   0141 VALPROIC ACID TOTAL: 83   0152 XR knee 4+ views Right injury  No acute osseous abnormality visualized.   0152 XR knee 4+ views left injury  No acute osseous abnormality visualized.   0155 TSH 3RD GENERATON: 2.877   0155 hs TnI 0hr: 4  EKG does not show any acute ischemic changes.   0258 Obtaining 2hr trop d/t symptoms occurring just PTA   0351 hs TnI 2hr: 4  0 delta       Medications - No data to display    ED Risk Strat Scores                    No data recorded    PERC Rule for PE      Flowsheet Row Most Recent Value   PERC Rule for PE    Age >=50 1 Filed at: 04/10/2025 0413   HR >=100 0 Filed at: 04/10/2025 0413   O2 Sat on room air < 95% 0 Filed at: 04/10/2025 0413   History of PE or DVT 0 Filed at: 04/10/2025 0413   Recent trauma or surgery 0 Filed at: 04/10/2025 0413   Hemoptysis 0 Filed at: 04/10/2025 0413   Exogenous estrogen 0 Filed at: 04/10/2025 0413   Unilateral leg swelling 0 Filed at: 04/10/2025 0413   PERC Rule for PE Results 1 Filed at: 04/10/2025 0413            SBIRT 20yo+      Flowsheet Row Most Recent Value   Initial Alcohol Screen: US AUDIT-C     1. How often do you have a drink containing alcohol? 0 Filed at: 04/10/2025 0021   2. How many drinks containing alcohol do you have on a typical day you are drinking?  0 Filed at: 04/10/2025 0021   3a. Male UNDER 65: How often do  "you have five or more drinks on one occasion? 0 Filed at: 04/10/2025 0021   3b. FEMALE Any Age, or MALE 65+: How often do you have 4 or more drinks on one occassion? 0 Filed at: 04/10/2025 0021   Audit-C Score 0 Filed at: 04/10/2025 0021   MARILIA: How many times in the past year have you...    Used an illegal drug or used a prescription medication for non-medical reasons? Never Filed at: 04/10/2025 0021            Wells' Criteria for PE      Flowsheet Row Most Recent Value   Wells' Criteria for PE    Clinical signs and symptoms of DVT 0 Filed at: 04/10/2025 0413   PE is primary diagnosis or equally likely 3 Filed at: 04/10/2025 0413   HR >100 0 Filed at: 04/10/2025 0413   Immobilization at least 3 days or Surgery in the previous 4 weeks 0 Filed at: 04/10/2025 0413   Previous, objectively diagnosed PE or DVT 0 Filed at: 04/10/2025 0413   Hemoptysis 0 Filed at: 04/10/2025 0413   Malignancy with treatment within 6 months or palliative 0 Filed at: 04/10/2025 0413   Wells' Criteria Total 3 Filed at: 04/10/2025 0413                        History of Present Illness       Chief Complaint   Patient presents with    Medical Problem     [facility] patient became pale, diaphoretic and almost lost consciousness. Hx of the same with getting blood work.        History reviewed. No pertinent past medical history.   History reviewed. No pertinent surgical history.   History reviewed. No pertinent family history.   Social History     Tobacco Use    Smoking status: Every Day     Types: Cigars    Smokeless tobacco: Never   Vaping Use    Vaping status: Every Day    Substances: Nicotine   Substance Use Topics    Alcohol use: Not Currently     Comment: rare    Drug use: Not Currently     Comment: \"i used to\"      E-Cigarette/Vaping    E-Cigarette Use Current Every Day User       E-Cigarette/Vaping Substances    Nicotine Yes     THC No     CBD No     Flavoring No     Other No     " Unknown No       I have reviewed and agree with the history as documented.     HPI  64-year-old F with h/o delusional disorder, schizophrenia, autism, diabetes, chronic hyponatremia presenting to ED with near syncopal episode.  Patient was getting blood work done tonight became pale, diaphoretic and almost lost consciousness.  Patient was getting blood work done when this happened.  Patient also reports he been having bilateral knee pain.  Patient had episode of vomiting afterwards.  Denies fever, chills, chest pain, shortness of breath, abdominal pain, nausea, diarrhea, constipation,  Any other signs or symptoms.      Review of Systems  ROS otherwise negative unless stated above.       Objective       ED Triage Vitals   Temperature Pulse Blood Pressure Respirations SpO2 Patient Position - Orthostatic VS   04/10/25 0103 04/10/25 0018 04/10/25 0018 04/10/25 0018 04/10/25 0018 --   (!) 97.4 °F (36.3 °C) 57 107/56 16 95 %       Temp Source Heart Rate Source BP Location FiO2 (%) Pain Score    04/10/25 0103 -- -- -- --    Oral          Vitals      Date and Time Temp Pulse SpO2 Resp BP Pain Score FACES Pain Rating User   04/10/25 0300 -- 55 98 % -- -- -- -- MM   04/10/25 0115 -- 55 98 % 16 -- -- -- CH   04/10/25 0103 97.4 °F (36.3 °C) -- -- -- -- -- -- DB   04/10/25 0030 -- 55 96 % 16 109/56 -- -- CH   04/10/25 0018 -- 57 95 % 16 107/56 -- -- CH            Physical Exam  General appearance: resting comfortably, no acute distress   HENT: Normocephalic, atraumatic, hearing grossly intact, and mucous membranes moist   Eyes: Conjunctiva normal, PERRL, EOM intact   Lungs/Chest: Respirations even and unlabored, breath sounds normal  Cardiovascular: bradycardia, regular rhythm  Abdomen: soft, non-distended, non-tender  Musculoskeletal: extremities normal, atraumatic, no cyanosis or edema   Pulses: radial / dorsalis pedis pulses palpable  Skin: warm and dry   Neurologic: Awake and alert, no apparent focal deficit  Psych: Mood  consistent with affect       Results Reviewed       Procedure Component Value Units Date/Time    HS Troponin I 2hr [[id number]]  (Normal) Collected: 04/10/25 0303    Lab Status: Final result Specimen: Blood from Arm, Right Updated: 04/10/25 0337     hs TnI 2hr 4 ng/L      Delta 2hr hsTnI 0 ng/L     HS Troponin 0hr (reflex protocol) [[id number]]  (Normal) Collected: 04/10/25 0046    Lab Status: Final result Specimen: Blood from Arm, Right Updated: 04/10/25 0153     hs TnI 0hr 4 ng/L     TSH, 3rd generation with Free T4 reflex [[id number]]  (Normal) Collected: 04/10/25 0046    Lab Status: Final result Specimen: Blood from Arm, Right Updated: 04/10/25 0153     TSH 3RD GENERATON 2.877 uIU/mL     Comprehensive metabolic panel [[id number]]  (Abnormal) Collected: 04/10/25 0046    Lab Status: Final result Specimen: Blood from Arm, Right Updated: 04/10/25 0139     Sodium 130 mmol/L      Potassium 4.3 mmol/L      Chloride 93 mmol/L      CO2 30 mmol/L      ANION GAP 7 mmol/L      BUN 13 mg/dL      Creatinine 0.96 mg/dL      Glucose 158 mg/dL      Calcium 9.2 mg/dL      AST 10 U/L      ALT 7 U/L      Alkaline Phosphatase 76 U/L      Total Protein 7.0 g/dL      Albumin 4.2 g/dL      Total Bilirubin 0.43 mg/dL      eGFR 83 ml/min/1.73sq m     Narrative:      National Kidney Disease Foundation guidelines for Chronic Kidney Disease (CKD):     Stage 1 with normal or high GFR (GFR > 90 mL/min/1.73 square meters)    Stage 2 Mild CKD (GFR = 60-89 mL/min/1.73 square meters)    Stage 3A Moderate CKD (GFR = 45-59 mL/min/1.73 square meters)    Stage 3B Moderate CKD (GFR = 30-44 mL/min/1.73 square meters)    Stage 4 Severe CKD (GFR = 15-29 mL/min/1.73 square meters)    Stage 5 End Stage CKD (GFR <15 mL/min/1.73 square meters)  Note: GFR calculation is accurate only with a steady state creatinine    Magnesium [[id number]]  (Normal) Collected: 04/10/25 0046    Lab Status: Final result Specimen: Blood from Arm, Right Updated: 04/10/25 0139      Magnesium 2.0 mg/dL     Valproic acid level, total [[id number]]  (Normal) Collected: 04/10/25 0046    Lab Status: Final result Specimen: Blood from Arm, Right Updated: 04/10/25 0139     Valproic Acid, Total 83 ug/mL     CK [[id number]]  (Normal) Collected: 04/10/25 0046    Lab Status: Final result Specimen: Blood from Arm, Right Updated: 04/10/25 0139     Total CK 62 U/L     Uric acid [[id number]]  (Abnormal) Collected: 04/10/25 0046    Lab Status: Final result Specimen: Blood from Arm, Right Updated: 04/10/25 0138     Uric Acid 2.5 mg/dL     Narrative:      N-acetyl-p-benzoquinone imine (metabolite of Acetaminophen) will generate erroneously low results in samples for patients that have taken an overdose of Acetaminophen.    D-Dimer [[id number]]  (Normal) Collected: 04/10/25 0046    Lab Status: Final result Specimen: Blood from Arm, Right Updated: 04/10/25 0131     D-Dimer, Quant <0.27 ug/ml FEU     Narrative:      In the evaluation for possible pulmonary embolism, in the appropriate (Well's Score of 4 or less) patient, the age adjusted d-dimer cutoff for this patient can be calculated as:    Age x 0.01 (in ug/mL) for Age-adjusted D-dimer exclusion threshold for a patient over 50 years.    CBC and differential [[id number]]  (Abnormal) Collected: 04/10/25 0046    Lab Status: Final result Specimen: Blood from Arm, Right Updated: 04/10/25 0116     WBC 6.70 Thousand/uL      RBC 4.05 Million/uL      Hemoglobin 13.8 g/dL      Hematocrit 40.2 %      MCV 99 fL      MCH 34.1 pg      MCHC 34.3 g/dL      RDW 12.5 %      MPV 9.7 fL      Platelets 218 Thousands/uL      nRBC 0 /100 WBCs      Segmented % 61 %      Immature Grans % 1 %      Lymphocytes % 25 %      Monocytes % 11 %      Eosinophils Relative 2 %      Basophils Relative 0 %      Absolute Neutrophils 4.01 Thousands/µL      Absolute Immature Grans 0.06 Thousand/uL      Absolute Lymphocytes 1.70 Thousands/µL      Absolute Monocytes 0.75 Thousand/µL      Eosinophils Absolute  0.15 Thousand/µL      Basophils Absolute 0.03 Thousands/µL             XR knee 4+ views Right injury   ED Interpretation by Gayla Venegas DO (04/10 0152)   No acute osseous abnormality visualized.      XR knee 4+ views left injury   ED Interpretation by Gayla Venegas DO (04/10 0152)   No acute osseous abnormality visualized.      XR chest 1 view portable   ED Interpretation by Gayla Venegas DO (04/10 0127)   No acute cardiopulmonary process visualized.          ECG 12 Lead Documentation Only    Date/Time: 4/10/2025 12:39 AM    Performed by: Gayla Venegas DO  Authorized by: Gayla Venegas DO    Patient location:  ED  Previous ECG:     Previous ECG:  Compared to current    Similarity:  No change  Interpretation:     Interpretation: normal    Rate:     ECG rate:  56    ECG rate assessment: bradycardic    Rhythm:     Rhythm: sinus bradycardia    Ectopy:     Ectopy: none    QRS:     QRS axis:  Normal    QRS intervals:  Normal  Conduction:     Conduction: normal    ST segments:     ST segments:  Normal  T waves:     T waves: normal        ED Medication and Procedure Management   Prior to Admission Medications   Prescriptions Last Dose Informant Patient Reported? Taking?   Invega Sustenna 234 MG/1.5ML IM injection  Self Yes No   Si mg Q 28 days   OneTouch Ultra test strip  Self Yes No   cholecalciferol (VITAMIN D3) 1,000 units tablet   No No   Sig: Take 1 tablet (1,000 Units total) by mouth daily Do not start before 2023.   divalproex sodium (DEPAKOTE ER) 500 mg 24 hr tablet   No No   Sig: Take 1 tablet (500 mg total) by mouth 2 (two) times a day   divalproex sodium (DEPAKOTE) 500 mg DR tablet   Yes No   Sig: Take 1,000 mg by mouth daily in the early morning   docusate sodium (COLACE) 100 mg capsule   No No   Sig: Take 1 capsule (100 mg total) by mouth 2 (two) times a day Do not start before 2023.   lidocaine (LIDODERM) 5 %   No No   Sig: Apply 1 patch  topically over 12 hours daily for 20 doses Remove & Discard patch within 12 hours or as directed by MD   meloxicam (MOBIC) 15 mg tablet   Yes No   Sig: Take 15 mg by mouth daily   methocarbamol (ROBAXIN) 500 mg tablet   No No   Sig: Take 1 tablet (500 mg total) by mouth every 6 (six) hours as needed for muscle spasms for up to 20 doses   pantoprazole (PROTONIX) 40 mg tablet   No No   Sig: Take 1 tablet (40 mg total) by mouth daily in the early morning Do not start before January 27, 2023.   risperiDONE (RisperDAL) 2 mg tablet   No No   Sig: Take 1 tablet (2 mg total) by mouth daily at bedtime   tamsulosin (FLOMAX) 0.4 mg   No No   Sig: Take 1 capsule (0.4 mg total) by mouth daily with dinner      Facility-Administered Medications: None     Patient's Medications   Discharge Prescriptions    No medications on file     No discharge procedures on file.  ED SEPSIS DOCUMENTATION   Time reflects when diagnosis was documented in both MDM as applicable and the Disposition within this note       Time User Action Codes Description Comment    4/10/2025  3:54 AM Gayla Venegas [R55] Near syncope     4/10/2025  3:54 AM Gayla Venegas [E87.1] Hyponatremia                  Gayla Venegas, DO  04/10/25 0409       Gayla Venegas, DO  04/10/25 0409       Gayla Venegas, DO  04/10/25 0413

## 2025-04-10 NOTE — ED ATTENDING ATTESTATION
4/10/2025  I, Og Lagos MD, saw and evaluated the patient. I have discussed the patient with the resident/non-physician practitioner and agree with the resident's/non-physician practitioner's findings, Plan of Care, and MDM as documented in the resident's/non-physician practitioner's note, except where noted. All available labs and Radiology studies were reviewed.  I was present for key portions of any procedure(s) performed by the resident/non-physician practitioner and I was immediately available to provide assistance.       At this point I agree with the current assessment done in the Emergency Department.  I have conducted an independent evaluation of this patient a history and physical is as follows: Patient is a 64 year old male with near syncope tonight at [facility] while getting blood drawn. (+) bilateral knee pain. No trauma. No chest pain or sob. No N/V. No headache. Had prior near syncope a long time ago with blooddraw. No fever. Was last seen at [facility] on 11/11/24 for urge incontinence. [facility] website checked on this patient and no Rx found. NCAT. No scleral icterus. Moist mucous membranes. Lungs clear. Bradycardia without murmur. Abdomen soft and nontender. Good bowel sounds. No edema. No rash noted. Bilateral anterior knee tenderness. No knee swelling. No gross focal deficits. Differential diagnosis including but not limited to: Near syncope, cardiac arrhythmia, MI, ACS, PE, metabolic abnormality; doubt intracranial process, seizure; anemia, GI bleed, dehydration. DDX including but not limited to: arthritis, bursitis, tendinitis, sprain, strain, fracture, meniscal tear; doubt cellulitis, osteomyelitis; gout; doubt Lyme disease, Baker's cyst, rheumatologic process; doubt septic arthritis or DVT or arterial occlusion. I reviewed EKG. Will check x-rays and labs.     ED Course         Critical Care Time  Procedures

## 2025-06-06 ENCOUNTER — TELEPHONE (OUTPATIENT)
Dept: NEPHROLOGY | Facility: CLINIC | Age: 65
End: 2025-06-06

## 2025-06-06 NOTE — TELEPHONE ENCOUNTER
Attempted to reach pt/group who helps pt via the phone number provided from [facility] - phone line immediately went to 'busy' and call was disconnected. Phoned pt's brother and left msg with appt details. Requested a call back to confirm and to bring insurance to appt if pt comes.

## 2025-06-06 NOTE — TELEPHONE ENCOUNTER
Spoke with Wilfrid at [facility] to confirm pt's appt on 6/9/25. He informed me pt was discharged, but pt was working with a 'group' who would possibly bring him to his appt. Wilfrid said they were going to find out more info, took down our info, and said they would call back with what they find. Unable to confirm appt at this time.